# Patient Record
Sex: MALE | Race: WHITE | NOT HISPANIC OR LATINO | Employment: FULL TIME | URBAN - NONMETROPOLITAN AREA
[De-identification: names, ages, dates, MRNs, and addresses within clinical notes are randomized per-mention and may not be internally consistent; named-entity substitution may affect disease eponyms.]

---

## 2020-02-07 ENCOUNTER — OFFICE VISIT CONVERTED (OUTPATIENT)
Dept: FAMILY MEDICINE CLINIC | Age: 30
End: 2020-02-07
Attending: NURSE PRACTITIONER

## 2020-06-01 ENCOUNTER — OFFICE VISIT CONVERTED (OUTPATIENT)
Dept: FAMILY MEDICINE CLINIC | Age: 30
End: 2020-06-01
Attending: FAMILY MEDICINE

## 2020-06-01 ENCOUNTER — HOSPITAL ENCOUNTER (OUTPATIENT)
Dept: OTHER | Facility: HOSPITAL | Age: 30
Discharge: HOME | End: 2020-06-01
Attending: FAMILY MEDICINE

## 2020-06-01 LAB
ALBUMIN SERPL-MCNC: 4.6 G/DL (ref 3.5–5)
ALBUMIN/GLOB SERPL: 2 {RATIO} (ref 1.4–2.6)
ALP SERPL-CCNC: 73 U/L (ref 53–128)
ALT SERPL-CCNC: 12 U/L (ref 10–40)
ANION GAP SERPL CALC-SCNC: 15 MMOL/L (ref 8–19)
AST SERPL-CCNC: 18 U/L (ref 15–50)
BASOPHILS # BLD MANUAL: 0.05 10*3/UL (ref 0–0.2)
BASOPHILS NFR BLD MANUAL: 0.9 % (ref 0–3)
BILIRUB SERPL-MCNC: 0.4 MG/DL (ref 0.2–1.3)
BUN SERPL-MCNC: 13 MG/DL (ref 5–25)
BUN/CREAT SERPL: 14 {RATIO} (ref 6–20)
CALCIUM SERPL-MCNC: 9.1 MG/DL (ref 8.7–10.4)
CHLORIDE SERPL-SCNC: 105 MMOL/L (ref 99–111)
CHOLEST SERPL-MCNC: 124 MG/DL (ref 107–200)
CHOLEST/HDLC SERPL: 3.2 {RATIO} (ref 3–6)
CONV CO2: 25 MMOL/L (ref 22–32)
CONV TOTAL PROTEIN: 6.9 G/DL (ref 6.3–8.2)
CREAT UR-MCNC: 0.95 MG/DL (ref 0.7–1.2)
DEPRECATED RDW RBC AUTO: 42.5 FL
EOSINOPHIL # BLD MANUAL: 0.81 10*3/UL (ref 0–0.7)
EOSINOPHIL NFR BLD MANUAL: 14.8 % (ref 0–7)
ERYTHROCYTE [DISTWIDTH] IN BLOOD BY AUTOMATED COUNT: 13.3 % (ref 11.5–14.5)
GFR SERPLBLD BASED ON 1.73 SQ M-ARVRAT: >60 ML/MIN/{1.73_M2}
GLOBULIN UR ELPH-MCNC: 2.3 G/DL (ref 2–3.5)
GLUCOSE SERPL-MCNC: 99 MG/DL (ref 70–99)
GRANS (ABSOLUTE): 2.63 10*3/UL (ref 2–8)
GRANS: 48 % (ref 30–85)
HBA1C MFR BLD: 13.3 G/DL (ref 14–18)
HCT VFR BLD AUTO: 39.1 % (ref 42–52)
HDLC SERPL-MCNC: 39 MG/DL (ref 40–60)
IMM GRANULOCYTES # BLD: 0 10*3/UL (ref 0–0.54)
IMM GRANULOCYTES NFR BLD: 0 % (ref 0–0.43)
LDLC SERPL CALC-MCNC: 64 MG/DL (ref 70–100)
LYMPHOCYTES # BLD MANUAL: 1.71 10*3/UL (ref 1–5)
LYMPHOCYTES NFR BLD MANUAL: 5.1 % (ref 3–10)
MCH RBC QN AUTO: 29.2 PG (ref 27–31)
MCHC RBC AUTO-ENTMCNC: 34 G/DL (ref 33–37)
MCV RBC AUTO: 85.9 FL (ref 80–96)
MONOCYTES # BLD AUTO: 0.28 10*3/UL (ref 0.2–1.2)
OSMOLALITY SERPL CALC.SUM OF ELEC: 292 MOSM/KG (ref 273–304)
PLATELET # BLD AUTO: 222 10*3/UL (ref 130–400)
PMV BLD AUTO: 9.4 FL (ref 7.4–10.4)
POTASSIUM SERPL-SCNC: 3.9 MMOL/L (ref 3.5–5.3)
RBC # BLD AUTO: 4.55 10*6/UL (ref 4.7–6.1)
SODIUM SERPL-SCNC: 141 MMOL/L (ref 135–147)
TRIGL SERPL-MCNC: 105 MG/DL (ref 40–150)
TSH SERPL-ACNC: 0.35 M[IU]/L (ref 0.27–4.2)
VARIANT LYMPHS NFR BLD MANUAL: 31.2 % (ref 20–45)
VLDLC SERPL-MCNC: 21 MG/DL (ref 5–37)
WBC # BLD AUTO: 5.48 10*3/UL (ref 4.8–10.8)

## 2020-06-09 ENCOUNTER — HOSPITAL ENCOUNTER (OUTPATIENT)
Dept: OTHER | Facility: HOSPITAL | Age: 30
Discharge: HOME | End: 2020-06-09
Attending: FAMILY MEDICINE

## 2020-06-09 LAB
CHARACTER SMN: ABNORMAL
COLOR SMN: ABNORMAL
COMPLETE EJACULATE: YES
CONV COLLECTION METHOD: ABNORMAL
Lab: ABNORMAL
Lab: NO
MORPHOLOGY: ABNORMAL
PH UR: 8.5 [PH]
SEX ABSTIN DURATION TIME PATIENT: 5 D
SPECIMEN VOL SMN: 2 ML (ref 1.5–5)
SPERM # SMN: 27 10*6/ML
SPERM MOTILE NFR SMN: ABNORMAL %
TRANSPORT ISSUES: NO
VISC SMN: NORMAL CP

## 2020-09-01 ENCOUNTER — HOSPITAL ENCOUNTER (OUTPATIENT)
Dept: OTHER | Facility: HOSPITAL | Age: 30
Discharge: HOME | End: 2020-09-01
Attending: FAMILY MEDICINE

## 2020-09-01 LAB
CHARACTER SMN: ABNORMAL
COLOR SMN: ABNORMAL
COMPLETE EJACULATE: YES
CONV COLLECTION METHOD: ABNORMAL
Lab: ABNORMAL
Lab: NO
MORPHOLOGY: ABNORMAL
PH UR: 8 [PH]
SEX ABSTIN DURATION TIME PATIENT: 5 D
SPECIMEN VOL SMN: 3 ML (ref 1.5–5)
SPERM # SMN: 32.8 10*6/ML
SPERM MOTILE NFR SMN: ABNORMAL %
TRANSPORT ISSUES: NO
VISC SMN: ABNORMAL CP

## 2021-04-13 ENCOUNTER — OFFICE VISIT CONVERTED (OUTPATIENT)
Dept: FAMILY MEDICINE CLINIC | Age: 31
End: 2021-04-13
Attending: NURSE PRACTITIONER

## 2021-05-18 NOTE — PROGRESS NOTES
Thomas Lei  1990     Office/Outpatient Visit    Visit Date: Tue, Apr 13, 2021 10:25 am    Provider: Otilia Haskins N.P. (Assistant: Bren Anderson,  )    Location: [facility]        Electronically signed by Otilia Haskins N.P. on  04/13/2021 06:06:24 PM                             Subjective:        CC: Mr. Lei is a 31 year old White male.  Pt states he has been having difficulty swallowing for 13 year. Pt states it has gotten worse overtime. Pt discribes it as extreme acid reflux. Pt also wants to discuss anxiety.          HPI:           Complaint of dysphagia, unspecified..  The symptom began years ago.  The severity is of severe intensity.  Associated symptoms include food stuck in esophagus.  He denies cough, diarrhea, hematuria or nausea.  Prior work-up has included none.  Has had this since teens.  Knows that he does have acid reflux, but never has been on any medication  - No continued vomiting when flare up, unable to get ever water down - did have an episode that he felt it was completely stuck - had to vomit to get it out does chew tobacco for about 16 years   No family history of any neck conditions          In regard to the anxiety disorder, unspecified, over this past year - more he has suggestive symptoms but does not currently carry an official diagnosis of anxiety disorder.  feels like this may be anxiety - happens a couple of times per day - feel like numbness in chest wll, shortness of air     ROS:     CONSTITUTIONAL:  Negative for chills, fatigue and fever.      CARDIOVASCULAR:  Negative for chest pain and pedal edema.      RESPIRATORY:  Negative for recent cough and dyspnea.      GASTROINTESTINAL:  Positive for acid reflux symptoms and dysphagia.   Negative for abdominal pain, constipation, diarrhea, heartburn, hematemesis, nausea or vomiting.      PSYCHIATRIC:  Positive for anxiety, feelings of stress, difficulty concentrating and (second shift).    Negative for depression or suicidal thoughts.          Past Medical History / Family History / Social History:         Last Reviewed on 4/13/2021 11:09 AM by Otilia Haskins    Surgical History:         Positive for    Hernia Repair: at age 17; right inguinal; uncomplicated; dual;,    3 previous concussion; and    left shoulder labrum repair at age 22 - sports related injury;;         Family History:         Positive for cardiomyapthy - father and fathers twin brother.      Positive for sjogrens - mother.          Social History:     Occupation: [employer]     Marital Status:      Children: None         Tobacco/Alcohol/Supplements:     Last Reviewed on 4/13/2021 11:09 AM by Otilia Haskins    Tobacco: He has never smoked. He currently uses smokeless tobacco, 1/2 can cans/pouches daily..          Alcohol: Frequency: Socially Just on weekends When he drinks, the average quantity of alcohol is 3-4 drinks.   He typically consumes malt liquor.          Substance Abuse History:     None         Mental Health History:     NEGATIVE         Current Problems:     Last Reviewed on 4/13/2021 11:00 AM by Otilia Haskins    Influenza due to identified novel influenza A virus with other respiratory manifestations    Fever, unspecified    Encounter for screening for depression    Procreative counseling and advice using natural family planning    Encounter for general adult medical examination without abnormal findings    Dysphagia, unspecified    Anxiety disorder, unspecified        Immunizations:     None        Allergies:     Last Reviewed on 4/13/2021 10:27 AM by Bren Anderson    No Known Allergies.        Current Medications:     Last Reviewed on 4/13/2021 10:27 AM by Bren Anderson    No Known Medications.    Vitamin B Complex  [Take 3 capsules once a day.]        Objective:        Vitals:         Current: 4/13/2021 10:31:35 AM    Ht:  6 ft, 1 in;  Wt: 185.2 lbs;  BMI: 24.4T: 96.4 F (temporal);  BP: 123/74 mm Hg  (left arm, sitting);  P: 84 bpm (left arm (BP Cuff), sitting);  sCr: 0.95 mg/dL;  GFR: 115.06        Exams:     PHYSICAL EXAM:     GENERAL: Vitals recorded well developed, well nourished;  no apparent distress;     E/N/T:     NECK: range of motion is normal;     RESPIRATORY: normal appearance and symmetric expansion of chest wall; normal respiratory rate and pattern with no distress; normal breath sounds with no rales, rhonchi, wheezes or rubs;     CARDIOVASCULAR: normal rate; rhythm is regular;  no edema;     GASTROINTESTINAL: nontender; normal bowel sounds; no organomegaly;     MUSCULOSKELETAL: normal gait; normal range of motion of all major muscle groups; no limb or joint pain with range of motion;     NEUROLOGIC: mental status: alert and oriented x 3;     PSYCHIATRIC: appropriate affect and demeanor; normal speech pattern; normal thought and perception;         Assessment:         R13.10   Dysphagia, unspecified       F41.9   Anxiety disorder, unspecified           ORDERS:         Meds Prescribed:       [New Rx] omeprazole 20 mg oral capsule,delayed release (enteric coated) [take 1 capsule (20 mg) by oral route daily ], #30 (thirty) capsules, Refills: 1 (one)       [New Rx] sertraline 50 mg oral tablet [take 1/2 tab x 1 week then 1 tablet (50 mg) by oral route once daily], #30 (thirty) tablets, Refills: 1 (one)         Procedures Ordered:       REFER  Referral to Specialist or Other Facility  (Send-Out)                      Plan:         Dysphagia, unspecifiedWill have him take a trial of omeprazole while waiting on referral appt.  I have recommended that he take this daily for at least 2 weeks.  He may find improvement but he would prefer to see a specialist for evaluation        REFERRALS:  Referral initiated to a general surgeon ( for evaluation of dysphagia/ acid reflux ).            Prescriptions:       [New Rx] omeprazole 20 mg oral capsule,delayed release (enteric coated) [take 1 capsule (20 mg) by oral  route daily ], #30 (thirty) capsules, Refills: 1 (one)           Orders:       REFER  Referral to Specialist or Other Facility  (Send-Out)              Anxiety disorder, unspecifiedI will start a short trial of SSRI - he will follow up in 3-4 weeks          Prescriptions:       [New Rx] sertraline 50 mg oral tablet [take 1/2 tab x 1 week then 1 tablet (50 mg) by oral route once daily], #30 (thirty) tablets, Refills: 1 (one)             Charge Capture:         Primary Diagnosis:     R13.10  Dysphagia, unspecified           Orders:      [id number]  Office/outpatient visit; established patient, level 4  (In-House)              F41.9  Anxiety disorder, unspecified

## 2021-05-18 NOTE — PROGRESS NOTES
Thomas Lei  1990     Office/Outpatient Visit    Visit Date: Fri, Feb 7, 2020 02:06 pm    Provider: Otilia Haskins N.P. (Assistant: Lacey Jaramillo MA)    Location: [facility]        Electronically signed by Otilia Haskins N.P. on  02/07/2020 02:58:27 PM                             Subjective:        CC: Mr. Lei is a 30 year old male.  This is his first visit to the clinic.  dry cough, congestion, body aches,         HPI:           Complaint of fever, unspecified..  The symptom began 3 days ago.  The severity is of severe intensity.  This is the first episode of this type of pain.  Associated symptoms include chills.  joint pain     ROS:     CONSTITUTIONAL:  Positive for chills and fever.   Negative for fatigue.      CARDIOVASCULAR:  Negative for chest pain and pedal edema.      RESPIRATORY:  Positive for recent cough.   Negative for dyspnea, pleuritic chest pain or frequent wheezing.      GASTROINTESTINAL:  Negative for diarrhea, heartburn, nausea and vomiting.      MUSCULOSKELETAL:  Positive for arthralgias and myalgias.          Past Medical History / Family History / Social History:         Surgical History:         Positive for    Hernia Repair: at age 17; right inguinal; uncomplicated; dual;,    3 previous concussion; and    left shoulder labrum repair at age 22 - sports related injury;;         Family History:         Positive for cardiomyapthy - father and fathers twin brother.      Positive for sjogrens - mother.          Social History:     Occupation: [employer]     Marital Status:      Children: None         Tobacco/Alcohol/Supplements:     Last Reviewed on 2/07/2020 02:08 PM by Lacey Jaramillo    Tobacco: He has never smoked.          Alcohol: Frequency: Socially Just on weekends When he drinks, the average quantity of alcohol is 3-4 drinks.   He typically consumes malt liquor.          Substance Abuse History:     None         Mental Health History:     NEGATIVE          Current Problems:     Influenza due to identified novel influenza A virus with other respiratory manifestations    Fever, unspecified        Immunizations:     None        Allergies:     Last Reviewed on 2/07/2020 02:08 PM by Lacey Jaramillo    No Known Allergies.        Current Medications:     Last Reviewed on 2/07/2020 02:08 PM by Lacey Jaramillo    No Known Medications.        Objective:        Vitals:         Current: 2/7/2020 2:11:30 PM    Ht:  6 ft, 1 in (Estimated);  Wt: 186.6 lbs;  BMI: 24.6T: 99.2 F (oral);  BP: 129/72 mm Hg (left arm, sitting);  P: 92 bpm (left arm (BP Cuff), sitting)        Exams:     PHYSICAL EXAM:     GENERAL: Vitals recorded well developed, well nourished;  no apparent distress, appears moderately ill;     NECK: range of motion is normal;     RESPIRATORY: normal appearance and symmetric expansion of chest wall; normal respiratory rate and pattern with no distress; normal breath sounds with no rales, rhonchi, wheezes or rubs;     CARDIOVASCULAR: normal rate; rhythm is regular;  no edema;     LYMPHATIC: no enlargement of cervical or facial nodes; no supraclavicular nodes;     MUSCULOSKELETAL: normal gait; normal range of motion of all major muscle groups; no limb or joint pain with range of motion;     NEUROLOGIC: mental status: alert and oriented x 3;     PSYCHIATRIC: appropriate affect and demeanor; normal speech pattern; normal thought and perception;         Lab/Test Results:         Influenza A: Positive (02/07/2020),     Influenza B: Negative (02/07/2020),     Performed by:: tls (02/07/2020),             Assessment:         R50.9   Fever, unspecified       J09.X2   Influenza due to identified novel influenza A virus with other respiratory manifestations       Z13.31   Encounter for screening for depression           ORDERS:         Meds Prescribed:       [New Rx] Tamiflu 75 mg oral capsule [take 1 capsule (75 mg) by oral route 2 times per day], #10 (ten) capsules, Refills: 0  (zero)         Lab Orders:       [id number]  Infectious agent antigen detection by immunoassay; Influenza  (In-House)            83720  Infectious agent antigen detection by immunoassay; Influenza  (In-House)              Other Orders:         Depression screen negative  (In-House)                      Plan:         Fever, unspecifiedibuprofen/tylenol for fever, increase fluids, contact precautions - RTW Monday unless fever then recommend until fever free x 24 hours           Orders:       [id number]  Infectious agent antigen detection by immunoassay; Influenza  (In-House)            93624  Infectious agent antigen detection by immunoassay; Influenza  (In-House)              Influenza due to identified novel influenza A virus with other respiratory manifestationsrobitussin/ nyquil for cough           Prescriptions:       [New Rx] Tamiflu 75 mg oral capsule [take 1 capsule (75 mg) by oral route 2 times per day], #10 (ten) capsules, Refills: 0 (zero)         Encounter for screening for depression    MIPS PHQ-9 Depression Screening: Completed form scanned and in chart; Total Score 0; Negative Depression Screen           Orders:         Depression screen negative  (In-House)                  Charge Capture:         Primary Diagnosis:     R50.9  Fever, unspecified           Orders:      [id number]  Office visit - new pt, level 3  (In-House)            [id number]  Infectious agent antigen detection by immunoassay; Influenza  (In-House)            28977  Infectious agent antigen detection by immunoassay; Influenza  (In-House)              J09.X2  Influenza due to identified novel influenza A virus with other respiratory manifestations     Z13.31  Encounter for screening for depression           Orders:        Depression screen negative  (In-House)

## 2021-05-18 NOTE — PROGRESS NOTES
Thomas Lei  1990     Office/Outpatient Visit    Visit Date: Mon, Jun 1, 2020 09:35 am    Provider: Michael Witt MD (Assistant: Spurling, Sarah C, MA)    Location: [facility]        Electronically signed by Michael Witt MD on  06/01/2020 06:00:18 PM                             Subjective:        CC: Mr. Lei is a 30 year old White male.  prevenative exam.          HPI: BP today is 121/76 with a HR of 83.          Patient to be evaluated for encounter for general adult medical examination without abnormal findings.            PHQ-9 Depression Screening: Completed form scanned and in chart; Total Score 0       Pt and his wife are trying to get pregnant and he is hoping to get a check up. He has been  for 6 years, they have been trying to get pregnant for 4 months without success so far. They have never used contraception other than the pull out method. Wants testosterone level, FSH and LH, TSH, and prolactin.    ROS:     CONSTITUTIONAL:  Negative for fatigue and fever.      EYES:  Negative for blurred vision.      E/N/T:  Negative for diminished hearing and nasal congestion.      CARDIOVASCULAR:  Negative for chest pain and palpitations.      RESPIRATORY:  Negative for recent cough and dyspnea.      GASTROINTESTINAL:  Negative for abdominal pain, constipation, diarrhea, nausea and vomiting.      GENITOURINARY:  Positive for difficulty conceiving after 4 months of trying.   Negative for dysuria.      MUSCULOSKELETAL:  Negative for arthralgias and myalgias.      INTEGUMENTARY:  Negative for atypical mole(s) and rash.      NEUROLOGICAL:  Negative for paresthesias and weakness.      PSYCHIATRIC:  Negative for anxiety, depression and sleep disturbance.          Past Medical History / Family History / Social History:         Surgical History:         Positive for    Hernia Repair: at age 17; right inguinal; uncomplicated; dual;,    3 previous concussion; and    left shoulder labrum  repair at age 22 - sports related injury;;         Family History:         Positive for cardiomyapthy - father and fathers twin brother.      Positive for sjogrens - mother.          Social History:     Occupation: [employer]     Marital Status:      Children: None         Tobacco/Alcohol/Supplements:     Last Reviewed on 2/07/2020 02:08 PM by Lacey Jaramillo    Tobacco: He has never smoked.          Alcohol: Frequency: Socially Just on weekends When he drinks, the average quantity of alcohol is 3-4 drinks.   He typically consumes malt liquor.          Substance Abuse History:     None         Mental Health History:     NEGATIVE         Current Problems:     Influenza due to identified novel influenza A virus with other respiratory manifestations    Fever, unspecified    Encounter for screening for depression        Immunizations:     None        Allergies:     Last Reviewed on 2/07/2020 02:08 PM by Lacey Jaramillo    No Known Allergies.        Current Medications:     Last Reviewed on 6/01/2020 09:41 AM by Spurling, Sarah C    No Known Medications.    Vitamin B Complex  [Take 3 capsules once a day.]    Natural Testosterone Booster  [3 capsules one time a day]    Omega Complex  [1 a day]    Vitamin D3  [One capsule a day]    Calcium   [3 a day]        Objective:        Vitals:         Current: 6/1/2020 9:48:19 AM    Ht:  6 ft, 1 in;  Wt: 189 lbs;  BMI: 24.9T: 98.4 F (oral);  BP: 121/76 mm Hg (left arm, sitting);  P: 83 bpm (left arm (BP Cuff), sitting)VA: 20/40 OD, 20/40 OS (near, with correction)        Exams:     PHYSICAL EXAM:     GENERAL: Vitals recorded well developed, well nourished;  well groomed;  no apparent distress;     EYES: extraocular movements intact; conjunctiva and cornea are normal; PERRLA;     E/N/T: OROPHARYNX:  normal mucosa, dentition, gingiva, and posterior pharynx;     NECK: range of motion is normal; trachea is midline;     RESPIRATORY: normal respiratory rate and pattern with no distress;  normal breath sounds with no rales, rhonchi, wheezes or rubs;     CARDIOVASCULAR: normal rate; rhythm is regular;  no systolic murmur; no edema;     GASTROINTESTINAL: nontender; normal bowel sounds;     MUSCULOSKELETAL: normal gait; normal overall tone     NEUROLOGIC: mental status: alert and oriented x 3; reflexes: knee jerks: 2+;  GROSSLY INTACT     PSYCHIATRIC:  appropriate affect and demeanor; normal speech pattern; grossly normal memory;         Assessment:         Z00.00   Encounter for general adult medical examination without abnormal findings       Z13.31   Encounter for screening for depression       Z31.61   Procreative counseling and advice using natural family planning           ORDERS:         Lab Orders:       [id number]  [facility] PHYSICAL: CMP, CBC, TSH, LIPID: [id number], [id number], [id number], [id number]  (Send-Out)            [id number]  Semen analysis; volume, count, motility, and differential  (Send-Out)                      Plan:         Encounter for general adult medical examination without abnormal findingsPt is overall in good health and has few medical conditions. We discussed nutrition and pt advised to eat a low sugar, non-processed diet. We discussed physical activity and healthy weight and pt is advised to exercise 150 minutes per week in addition to his activity at work. Pt does not smoke, does not use tobacco products, and does not use illicit drugs. Pt is advised to limit alcohol to 2 drinks per day at most. He is advised to stay up to date with dental health and immunizations. He is UTD on screenings.     LABORATORY:  Labs ordered to be performed today include PHYSICAL PANEL; CMP, CBC, TSH, LIPID.            Orders:       [id number]  [facility] PHYSICAL: CMP, CBC, TSH, LIPID: [id number], [id number], [id number], [id number]  (Send-Out)              Encounter for screening for depression    MIPS PHQ-9 Depression Screening: Completed form scanned and in chart; Total Score 0         Procreative counseling and advice using natural  family planningPt is advised LH and FSH are not really indicated and neither is testosterone at this time given paucity of Sx. Instead a semen analysis is more reasonable although infertility is not really Dx'd until 12 months have passed without pregnancy and actively trying to get pregnant. Consider referral to urology if needed.           Orders:       [id number]  Semen analysis; volume, count, motility, and differential  (Send-Out)                  Charge Capture:         Primary Diagnosis:     Z00.00  Encounter for general adult medical examination without abnormal findings           Orders:      [id number]  Preventive medicine, established patient, age 18-39 years  (In-House)              Z13.31  Encounter for screening for depression     Z31.61  Procreative counseling and advice using natural family planning

## 2021-07-02 ENCOUNTER — OFFICE VISIT (OUTPATIENT)
Dept: FAMILY MEDICINE CLINIC | Age: 31
End: 2021-07-02

## 2021-07-02 VITALS
WEIGHT: 191.8 LBS | HEART RATE: 70 BPM | TEMPERATURE: 98.5 F | DIASTOLIC BLOOD PRESSURE: 84 MMHG | SYSTOLIC BLOOD PRESSURE: 115 MMHG | BODY MASS INDEX: 25.42 KG/M2 | HEIGHT: 73 IN

## 2021-07-02 VITALS
HEIGHT: 73 IN | TEMPERATURE: 99.2 F | DIASTOLIC BLOOD PRESSURE: 72 MMHG | SYSTOLIC BLOOD PRESSURE: 129 MMHG | HEART RATE: 92 BPM | BODY MASS INDEX: 24.73 KG/M2 | WEIGHT: 186.6 LBS

## 2021-07-02 VITALS
TEMPERATURE: 98.4 F | DIASTOLIC BLOOD PRESSURE: 76 MMHG | BODY MASS INDEX: 25.05 KG/M2 | SYSTOLIC BLOOD PRESSURE: 121 MMHG | HEART RATE: 83 BPM | WEIGHT: 189 LBS | HEIGHT: 73 IN

## 2021-07-02 VITALS
BODY MASS INDEX: 24.55 KG/M2 | HEIGHT: 73 IN | SYSTOLIC BLOOD PRESSURE: 123 MMHG | HEART RATE: 84 BPM | WEIGHT: 185.2 LBS | TEMPERATURE: 96.4 F | DIASTOLIC BLOOD PRESSURE: 74 MMHG

## 2021-07-02 DIAGNOSIS — F41.9 ANXIETY: Primary | Chronic | ICD-10-CM

## 2021-07-02 PROCEDURE — 99213 OFFICE O/P EST LOW 20 MIN: CPT | Performed by: NURSE PRACTITIONER

## 2021-07-02 RX ORDER — OMEPRAZOLE 20 MG/1
20 CAPSULE, DELAYED RELEASE ORAL AS NEEDED
Status: ON HOLD | COMMUNITY
Start: 2021-04-13 | End: 2022-04-14 | Stop reason: SDUPTHER

## 2021-07-02 NOTE — PROGRESS NOTES
"Chief Complaint  Anxiety and Follow-up    Subjective          Thomas Lei presents to [facility] Thomas is here for follow-up on his anxiety.  He was seen in the office about 3 months ago and placed on sertraline.  He reports today that he is doing very well on this medication.  No side effects.  It has improved his palpitations and chest tightness.    Review of Systems   Constitutional: Negative for fatigue and fever.   Respiratory: Negative for shortness of breath.    Cardiovascular: Negative for chest pain.   Psychiatric/Behavioral: Positive for stress (new baby). Negative for agitation, decreased concentration, dysphoric mood, sleep disturbance and depressed mood. The patient is not nervous/anxious.          Health Maintenance Due   Topic Date Due   • ANNUAL PHYSICAL  Never done   • TDAP/TD VACCINES (1 - Tdap) Never done   • HEPATITIS C SCREENING  Never done        Objective     Vital Signs:   /84 (BP Location: Right arm, Patient Position: Sitting)   Pulse 70   Temp 98.5 °F (36.9 °C) (Oral)   Ht 185.4 cm (73\")   Wt 87 kg (191 lb 12.8 oz)   BMI 25.30 kg/m²       Physical Exam  Vitals reviewed.   Constitutional:       Appearance: Normal appearance.   HENT:      Head: Normocephalic.   Eyes:      Pupils: Pupils are equal, round, and reactive to light.   Cardiovascular:      Rate and Rhythm: Normal rate and regular rhythm.      Heart sounds: No murmur heard.     Pulmonary:      Effort: Pulmonary effort is normal.      Breath sounds: Normal breath sounds.   Musculoskeletal:         General: Normal range of motion.   Neurological:      Mental Status: He is alert.   Psychiatric:         Mood and Affect: Mood normal.         Behavior: Behavior normal.         Judgment: Judgment normal.          Result Review :                      Assessment and Plan    Diagnoses and all orders for this visit:    1. Anxiety (Primary)  Comments:  continue current treatment and follow up " in 6 months . He will be moving soon and will be having to transfer care   Orders:  -     sertraline (ZOLOFT) 50 MG tablet; Take 1 tablet by mouth Daily.  Dispense: 90 tablet; Refill: 0            Follow Up    Return in about 6 months (around 1/2/2022) for Annual physical, Recheck.      Patient was given instructions and counseling regarding his condition or for health maintenance advice. Please see specific information pulled into the AVS if appropriate.

## 2021-11-21 DIAGNOSIS — F41.9 ANXIETY: Chronic | ICD-10-CM

## 2022-02-23 ENCOUNTER — OFFICE VISIT (OUTPATIENT)
Dept: FAMILY MEDICINE CLINIC | Age: 32
End: 2022-02-23

## 2022-02-23 VITALS
HEIGHT: 73 IN | SYSTOLIC BLOOD PRESSURE: 130 MMHG | WEIGHT: 193 LBS | DIASTOLIC BLOOD PRESSURE: 92 MMHG | HEART RATE: 65 BPM | OXYGEN SATURATION: 97 % | BODY MASS INDEX: 25.58 KG/M2

## 2022-02-23 DIAGNOSIS — F41.9 ANXIETY: Chronic | ICD-10-CM

## 2022-02-23 DIAGNOSIS — R41.840 ATTENTION AND CONCENTRATION DEFICIT: ICD-10-CM

## 2022-02-23 DIAGNOSIS — R13.19 OTHER DYSPHAGIA: Primary | ICD-10-CM

## 2022-02-23 PROCEDURE — 99213 OFFICE O/P EST LOW 20 MIN: CPT | Performed by: NURSE PRACTITIONER

## 2022-02-23 RX ORDER — BUPROPION HYDROCHLORIDE 150 MG/1
150 TABLET ORAL DAILY
Qty: 30 TABLET | Refills: 1 | Status: SHIPPED | OUTPATIENT
Start: 2022-02-23 | End: 2022-04-18 | Stop reason: SDUPTHER

## 2022-02-23 NOTE — PROGRESS NOTES
Chief Complaint  Thomas Lei presents to [facility] for trouble swollowing (patient complains of trouble swollowing, feels like he cannot breathe and has to vomit has been going on for 2-3 years but is worsening to weekly ) and Focus (patient complains of trouble focusing at work )    Subjective          Has been having difficulty swallowing, This has been going on for years, but worsening over the past few months.   Will have episodes at least one time weekly.  Will generally be triggered by 'chunks' of food which he reports will have a spasm and unable to get any consistency down for about 30min-1hr after.  This lodging of the food is generally in the lower esophageal / epigastric area  - He reports having a barium swallow in high school  Was told GERD - started on PPI with improvement Currently taking omeprazole  Denies any acid reflux symptoms      Wants to discuss problems with focus  Reports that over the past few months , starting to interfere with his job performance.  He finds himself losing focus, unable to stay on task.  He does report that in college, he did have similar symptoms but was in a position that he was unable to receive treatment.  He feels like he is unable to take on large tasks and is falling behind on his work.  He has never been on medication for this in the past.  He is currently taking sertraline for his anxiety and reports that this is working very well for his previous symptoms of palpitations.               Review of Systems      No Known Allergies   Past Medical History:   Diagnosis Date   • Anxiety disorder, unspecified    • Concussion     X3   • Dysphagia, unspecified    • Fever, unspecified    • Influenza due to identified novel influenza A virus with other respiratory manifestations    • Procreative counseling and advice using natural family planning      Current Outpatient Medications   Medication Sig Dispense Refill   • omeprazole  "(priLOSEC) 20 MG capsule Take 20 mg by mouth As Needed.     • sertraline (ZOLOFT) 50 MG tablet Take 1 tablet by mouth Daily. 90 tablet 0   • buPROPion XL (Wellbutrin XL) 150 MG 24 hr tablet Take 1 tablet by mouth Daily. 30 tablet 1     No current facility-administered medications for this visit.     Past Surgical History:   Procedure Laterality Date   • HERNIA REPAIR      Hernia Repair: at age 17; right inguinal; uncomplicated; dual;   • SHOULDER SURGERY      left shoulder labrum repair at age 22 - sports related injury      Social History     Tobacco Use   • Smoking status: Never Smoker   • Smokeless tobacco: Current User     Types: Chew   • Tobacco comment: 1/2 CAN/POUCHES DAILY    Vaping Use   • Vaping Use: Never used   Substance Use Topics   • Alcohol use: Yes     Comment: 1-2 drinks per month    • Drug use: Never     Comment: NONE     Family History   Problem Relation Age of Onset   • Sjogren's syndrome Mother    • Diverticulitis Mother    • Cardiomyopathy Father    • Cardiomyopathy Paternal Uncle      Health Maintenance Due   Topic Date Due   • ANNUAL PHYSICAL  Never done   • TDAP/TD VACCINES (1 - Tdap) Never done   • HEPATITIS C SCREENING  Never done   • INFLUENZA VACCINE  Never done   • COVID-19 Vaccine (3 - Booster for Pfizer series) 11/10/2021      Immunization History   Administered Date(s) Administered   • COVID-19 (PFIZER) PURPLE CAP 05/19/2021, 06/10/2021        Objective     Vitals:    02/23/22 0829   BP: 130/92   BP Location: Right arm   Patient Position: Sitting   Cuff Size: Adult   Pulse: 65   SpO2: 97%   Weight: 87.5 kg (193 lb)   Height: 185.4 cm (73\")     Body mass index is 25.46 kg/m².     Physical Exam  Vitals reviewed.   Constitutional:       Appearance: Normal appearance.   HENT:      Head: Normocephalic.   Eyes:      Pupils: Pupils are equal, round, and reactive to light.   Cardiovascular:      Rate and Rhythm: Normal rate and regular rhythm.      Heart sounds: No murmur " heard.      Pulmonary:      Effort: Pulmonary effort is normal.      Breath sounds: Normal breath sounds.   Abdominal:      Palpations: Abdomen is soft.      Tenderness: There is no abdominal tenderness.   Musculoskeletal:         General: Normal range of motion.   Neurological:      Mental Status: He is alert.   Psychiatric:         Mood and Affect: Mood normal.         Behavior: Behavior normal.           Result Review :                               Assessment and Plan      Diagnoses and all orders for this visit:    1. Other dysphagia (Primary)  Comments:  will refer to Gastroenterology for EGD/evaluation of possible stricture  continue daily omeprazole    Orders:  -     Ambulatory Referral to Gastroenterology    2. Anxiety  Comments:  continue current treatment and follow up in 6 months   Orders:  -     sertraline (ZOLOFT) 50 MG tablet; Take 1 tablet by mouth Daily.  Dispense: 90 tablet; Refill: 0    3. Attention and concentration deficit  Comments:  Will try him on wellbutrin, have him follow up in 6-8 weeks, if no improvement - refer to Celina Barclay for further recommendations   Orders:  -     buPROPion XL (Wellbutrin XL) 150 MG 24 hr tablet; Take 1 tablet by mouth Daily.  Dispense: 30 tablet; Refill: 1              Follow Up     Return in about 8 weeks (around 4/20/2022) for Follow up medications .

## 2022-02-28 ENCOUNTER — OFFICE VISIT (OUTPATIENT)
Dept: GASTROENTEROLOGY | Facility: CLINIC | Age: 32
End: 2022-02-28

## 2022-02-28 VITALS
SYSTOLIC BLOOD PRESSURE: 125 MMHG | BODY MASS INDEX: 23.5 KG/M2 | RESPIRATION RATE: 16 BRPM | WEIGHT: 189 LBS | DIASTOLIC BLOOD PRESSURE: 72 MMHG | HEART RATE: 85 BPM | HEIGHT: 75 IN

## 2022-02-28 DIAGNOSIS — R13.19 ESOPHAGEAL DYSPHAGIA: Primary | ICD-10-CM

## 2022-02-28 PROCEDURE — 99214 OFFICE O/P EST MOD 30 MIN: CPT | Performed by: NURSE PRACTITIONER

## 2022-02-28 NOTE — PROGRESS NOTES
Patient Name: Thomas Lei   Visit Date: 02/28/2022   Patient ID: [id number]  Provider: JUAN Martines    Sex: male  Location:  Location Address:  Location Phone: [phone] [facility] [place] [place] [address] [address] [phone]    YOB: 1990      Primary Care Provider Otilia Haskins APRN      Referring Provider: JUAN Rice        Chief Complaint  Difficulty Swallowing (New Patient  c/o food getting stuck )    History of Present Illness  Thomas Lei is a 32 y.o. who presents to [facility] on referral from JUAN Rice for a gastroenterology evaluation of Difficulty Swallowing (New Patient  c/o food getting stuck ).    Mr. Lei reports lower esophageal dysphagia now for several years however episodes are occurring more frequently.  Admits to trouble swallowing with solids however liquids will also make dysphagia worse if food is already lodged in the esophagus.  When food becomes stuck he has to force himself to vomit to find relief.  Denies any heartburn, nausea, vomiting, or frequent NSAID usage.  Drinking 2 to 3 cups of coffee daily.  Appetite and weight stable.       Labs Result Review Imaging    Past Medical History:   Diagnosis Date   • Anxiety disorder, unspecified    • Concussion     X3   • Dysphagia, unspecified    • Fever, unspecified    • Influenza due to identified novel influenza A virus with other respiratory manifestations    • Procreative counseling and advice using natural family planning        Past Surgical History:   Procedure Laterality Date   • HERNIA REPAIR      Hernia Repair: at age 17; right inguinal; uncomplicated; dual;   • SHOULDER SURGERY      left shoulder labrum repair at age 22 - sports related injury         Current Outpatient Medications:   •  buPROPion XL (Wellbutrin XL) 150 MG 24 hr tablet, Take 1 tablet by mouth Daily., Disp: 30 tablet, Rfl: 1  •  omeprazole (priLOSEC) 20 MG capsule, Take 20  "mg by mouth As Needed., Disp: , Rfl:   •  sertraline (ZOLOFT) 50 MG tablet, Take 1 tablet by mouth Daily., Disp: 90 tablet, Rfl: 0     No Known Allergies    Family History   Problem Relation Age of Onset   • Sjogren's syndrome Mother    • Diverticulitis Mother    • Cardiomyopathy Father    • Cardiomyopathy Paternal Uncle         Social History     Social History Narrative   • Not on file         Objective     Review of Systems   Constitutional: Negative for appetite change and unexpected weight loss.   Gastrointestinal: Negative for abdominal pain, nausea, vomiting and GERD.        Vital Signs:   /72 (BP Location: Right arm, Patient Position: Sitting, Cuff Size: Adult)   Pulse 85   Resp 16   Ht 190.5 cm (75\")   Wt 85.7 kg (189 lb)   BMI 23.62 kg/m²       Physical Exam  Constitutional:       General: He is not in acute distress.     Appearance: Normal appearance. He is well-developed and normal weight.   HENT:      Head: Normocephalic and atraumatic.   Eyes:      Conjunctiva/sclera: Conjunctivae normal.      Pupils: Pupils are equal, round, and reactive to light.      Visual Fields: Right eye visual fields normal and left eye visual fields normal.   Cardiovascular:      Rate and Rhythm: Normal rate and regular rhythm.      Heart sounds: Normal heart sounds.   Pulmonary:      Effort: Pulmonary effort is normal. No retractions.      Breath sounds: Normal breath sounds and air entry.   Abdominal:      General: Bowel sounds are normal. There is no distension.      Palpations: Abdomen is soft.      Tenderness: There is no abdominal tenderness.      Comments: No appreciable hepatosplenomegaly or ascites   Musculoskeletal:         General: Normal range of motion.      Cervical back: Normal range of motion and neck supple.   Skin:     General: Skin is warm and dry.   Neurological:      Mental Status: He is alert and oriented to person, place, and time.   Psychiatric:         Mood and Affect: Mood and affect " normal.         Behavior: Behavior normal.         Result Review :   The following data was reviewed by: JUAN Martines on 02/28/2022:      No results found for: LIPASE, AMYLASE, IRON, TIBC, LABIRON, TRANSFERRIN, FERRITIN, SEDRATE, CRP, AFPTM, DSDNA, EXPANDEDENA, SMOOTHMUSCAB, CERULOPLSM, LABIMMURE, TOTIGGREF, IGA, IGM, MITOAB, HEPBSAG, HEPAIGM, HEPBIGMCORE, HEPCVIRUSABY, PROTIME, INR, AMMONIA          Assessment and Plan    Diagnoses and all orders for this visit:    1. Esophageal dysphagia (Primary)  -     Case Request; Standing  -     Case Request    Other orders  -     Follow Anesthesia Guidelines / Protocol; Future  -     Obtain Informed Consent; Future      ESOPHAGOGASTRODUODENOSCOPY (N/A)       Follow Up   Return for Follow up after procedure.  Patient was given instructions and counseling regarding his condition or for health maintenance advice. Please see specific information pulled into the AVS if appropriate.

## 2022-03-09 ENCOUNTER — PATIENT ROUNDING (BHMG ONLY) (OUTPATIENT)
Dept: GASTROENTEROLOGY | Facility: CLINIC | Age: 32
End: 2022-03-09

## 2022-03-09 NOTE — PROGRESS NOTES
March 9, 2022    Hello, may I speak with Thomas Lei?    My name is Christina     I am  with [facility]  [address] [address]  [facility] [address] [phone].    Before we get started may I verify your date of birth? 1990    I am calling to officially welcome you to our practice and ask about your recent visit. Is this a good time to talk? Yes     Tell me about your visit with us. What things went well?  Easy to find, short wait time, listened to my concerns, seemed like they generally cared      We're always looking for ways to make our patients' experiences even better. Do you have recommendations on ways we may improve?  No-if I could give it 5 out of 5 stars I would     Overall were you satisfied with your first visit to our practice? Yes        I appreciate you taking the time to speak with me today. Is there anything else I can do for you?       Thank you, and have a great day.

## 2022-04-07 ENCOUNTER — TELEPHONE (OUTPATIENT)
Dept: GASTROENTEROLOGY | Facility: CLINIC | Age: 32
End: 2022-04-07

## 2022-04-14 ENCOUNTER — HOSPITAL ENCOUNTER (OUTPATIENT)
Facility: HOSPITAL | Age: 32
Setting detail: HOSPITAL OUTPATIENT SURGERY
Discharge: HOME | End: 2022-04-14
Attending: INTERNAL MEDICINE | Admitting: INTERNAL MEDICINE

## 2022-04-14 ENCOUNTER — ANESTHESIA EVENT (OUTPATIENT)
Dept: GASTROENTEROLOGY | Facility: HOSPITAL | Age: 32
End: 2022-04-14

## 2022-04-14 ENCOUNTER — ANESTHESIA (OUTPATIENT)
Dept: GASTROENTEROLOGY | Facility: HOSPITAL | Age: 32
End: 2022-04-14

## 2022-04-14 VITALS
OXYGEN SATURATION: 97 % | HEART RATE: 63 BPM | WEIGHT: 189.15 LBS | TEMPERATURE: 98.2 F | SYSTOLIC BLOOD PRESSURE: 105 MMHG | RESPIRATION RATE: 15 BRPM | HEIGHT: 76 IN | BODY MASS INDEX: 23.03 KG/M2 | DIASTOLIC BLOOD PRESSURE: 62 MMHG

## 2022-04-14 DIAGNOSIS — R13.19 ESOPHAGEAL DYSPHAGIA: ICD-10-CM

## 2022-04-14 PROCEDURE — 25010000002 PROPOFOL 10 MG/ML EMULSION: Performed by: NURSE ANESTHETIST, CERTIFIED REGISTERED

## 2022-04-14 PROCEDURE — 88305 TISSUE EXAM BY PATHOLOGIST: CPT | Performed by: INTERNAL MEDICINE

## 2022-04-14 PROCEDURE — 43239 EGD BIOPSY SINGLE/MULTIPLE: CPT | Performed by: INTERNAL MEDICINE

## 2022-04-14 PROCEDURE — 88342 IMHCHEM/IMCYTCHM 1ST ANTB: CPT | Performed by: INTERNAL MEDICINE

## 2022-04-14 PROCEDURE — 25010000002 MIDAZOLAM PER 1 MG: Performed by: NURSE ANESTHETIST, CERTIFIED REGISTERED

## 2022-04-14 RX ORDER — MIDAZOLAM HYDROCHLORIDE 1 MG/ML
INJECTION INTRAMUSCULAR; INTRAVENOUS AS NEEDED
Status: DISCONTINUED | OUTPATIENT
Start: 2022-04-14 | End: 2022-04-14 | Stop reason: SURG

## 2022-04-14 RX ORDER — PROPOFOL 10 MG/ML
VIAL (ML) INTRAVENOUS AS NEEDED
Status: DISCONTINUED | OUTPATIENT
Start: 2022-04-14 | End: 2022-04-14 | Stop reason: SURG

## 2022-04-14 RX ORDER — SODIUM CHLORIDE, SODIUM LACTATE, POTASSIUM CHLORIDE, CALCIUM CHLORIDE 600; 310; 30; 20 MG/100ML; MG/100ML; MG/100ML; MG/100ML
30 INJECTION, SOLUTION INTRAVENOUS CONTINUOUS
Status: DISCONTINUED | OUTPATIENT
Start: 2022-04-14 | End: 2022-04-14 | Stop reason: HOSPADM

## 2022-04-14 RX ORDER — SODIUM CHLORIDE, SODIUM LACTATE, POTASSIUM CHLORIDE, CALCIUM CHLORIDE 600; 310; 30; 20 MG/100ML; MG/100ML; MG/100ML; MG/100ML
1000 INJECTION, SOLUTION INTRAVENOUS CONTINUOUS
Status: DISCONTINUED | OUTPATIENT
Start: 2022-04-14 | End: 2022-04-14 | Stop reason: HOSPADM

## 2022-04-14 RX ORDER — OMEPRAZOLE 40 MG/1
40 CAPSULE, DELAYED RELEASE ORAL DAILY
Qty: 90 CAPSULE | Refills: 0 | Status: SHIPPED | OUTPATIENT
Start: 2022-04-14 | End: 2022-12-28

## 2022-04-14 RX ORDER — LIDOCAINE HYDROCHLORIDE 20 MG/ML
INJECTION, SOLUTION INFILTRATION; PERINEURAL AS NEEDED
Status: DISCONTINUED | OUTPATIENT
Start: 2022-04-14 | End: 2022-04-14 | Stop reason: SURG

## 2022-04-14 RX ADMIN — PROPOFOL 150 MG: 10 INJECTION, EMULSION INTRAVENOUS at 07:25

## 2022-04-14 RX ADMIN — PROPOFOL 250 MCG/KG/MIN: 10 INJECTION, EMULSION INTRAVENOUS at 07:25

## 2022-04-14 RX ADMIN — MIDAZOLAM HYDROCHLORIDE 2 MG: 1 INJECTION, SOLUTION INTRAMUSCULAR; INTRAVENOUS at 07:24

## 2022-04-14 RX ADMIN — LIDOCAINE HYDROCHLORIDE 100 MG: 20 INJECTION, SOLUTION INFILTRATION; PERINEURAL at 07:25

## 2022-04-14 RX ADMIN — SODIUM CHLORIDE, POTASSIUM CHLORIDE, SODIUM LACTATE AND CALCIUM CHLORIDE 30 ML/HR: 600; 310; 30; 20 INJECTION, SOLUTION INTRAVENOUS at 06:48

## 2022-04-14 NOTE — ANESTHESIA PREPROCEDURE EVALUATION
Anesthesia Evaluation     Patient summary reviewed and Nursing notes reviewed   history of anesthetic complications: PONV  NPO Solid Status: > 8 hours  NPO Liquid Status: > 2 hours           Airway   Mallampati: I  TM distance: >3 FB  Neck ROM: full  No difficulty expected  Dental      Pulmonary - negative pulmonary ROS and normal exam    breath sounds clear to auscultation  Cardiovascular - negative cardio ROS and normal exam  Exercise tolerance: good (4-7 METS)    Rhythm: regular  Rate: normal        Neuro/Psych  (+) psychiatric history Anxiety,    GI/Hepatic/Renal/Endo - negative ROS     Musculoskeletal (-) negative ROS    Abdominal    Substance History - negative use     OB/GYN negative ob/gyn ROS         Other - negative ROS                       Anesthesia Plan    ASA 2     general       Anesthetic plan, all risks, benefits, and alternatives have been provided, discussed and informed consent has been obtained with: patient.        CODE STATUS:

## 2022-04-14 NOTE — ANESTHESIA POSTPROCEDURE EVALUATION
Patient: Thomas Lei    Procedure Summary     Date: 04/14/22 Room / Location: [facility] ENDOSCOPY 2 / [facility]COPY    Anesthesia Start: 0723 Anesthesia Stop: 0738    Procedure: ESOPHAGOGASTRODUODENOSCOPY WITH BX (N/A ) Diagnosis:       Esophageal dysphagia      (Esophageal dysphagia [R13.19])    Surgeons: Joan Luna MD Provider: Feroz Oreilly MD    Anesthesia Type: general ASA Status: 2          Anesthesia Type: general    Vitals  Vitals Value Taken Time   /63 04/14/22 0743   Temp     Pulse 69 04/14/22 0744   Resp     SpO2 97 % 04/14/22 0744   Vitals shown include unvalidated device data.        Post Anesthesia Care and Evaluation    Patient location during evaluation: bedside  Patient participation: complete - patient participated  Level of consciousness: awake  Pain management: adequate  Airway patency: patent  Anesthetic complications: No anesthetic complications  PONV Status: none  Cardiovascular status: acceptable and stable  Respiratory status: acceptable  Hydration status: acceptable    Comments: An Anesthesiologist personally participated in the most demanding procedures (including induction and emergence if applicable) in the anesthesia plan, monitored the course of anesthesia administration at frequent intervals and remained physically present and available for immediate diagnosis and treatment of emergencies.

## 2022-04-14 NOTE — H&P
"Pre Procedure History & Physical    Chief Complaint:   dysphagia    Subjective     HPI:   31 yo M here for eval of dysphagia.    Past Medical History:   Past Medical History:   Diagnosis Date   • Anxiety disorder, unspecified    • Concussion     X3   • Dysphagia, unspecified    • Fever, unspecified    • Influenza due to identified novel influenza A virus with other respiratory manifestations    • PONV (postoperative nausea and vomiting)    • Procreative counseling and advice using natural family planning        Past Surgical History:  Past Surgical History:   Procedure Laterality Date   • HERNIA REPAIR      Hernia Repair: at age 17; right inguinal; uncomplicated; dual;   • SHOULDER SURGERY      left shoulder labrum repair at age 22 - sports related injury       Family History:  Family History   Problem Relation Age of Onset   • Sjogren's syndrome Mother    • Diverticulitis Mother    • Cardiomyopathy Father    • Cardiomyopathy Paternal Uncle        Social History:   reports that he has never smoked. His smokeless tobacco use includes chew. He reports current alcohol use. He reports that he does not use drugs.    Medications:   Medications Prior to Admission   Medication Sig Dispense Refill Last Dose   • buPROPion XL (Wellbutrin XL) 150 MG 24 hr tablet Take 1 tablet by mouth Daily. 30 tablet 1 4/13/2022 at Unknown time   • omeprazole (priLOSEC) 20 MG capsule Take 20 mg by mouth As Needed.   Past Month at Unknown time   • sertraline (ZOLOFT) 50 MG tablet Take 1 tablet by mouth Daily. 90 tablet 0 4/13/2022 at Unknown time       Allergies:  Patient has no known allergies.    ROS:    Pertinent items are noted in HPI     Objective     Blood pressure 121/73, pulse 77, temperature 98.4 °F (36.9 °C), temperature source Temporal, resp. rate 18, height 193 cm (76\"), weight 85.8 kg (189 lb 2.5 oz), SpO2 96 %.    Physical Exam   Constitutional: Pt is oriented to person, place, and time and well-developed, well-nourished, and in " no distress.   Mouth/Throat: Oropharynx is clear and moist.   Neck: Normal range of motion.   Cardiovascular: Normal rate, regular rhythm and normal heart sounds.    Pulmonary/Chest: Effort normal and breath sounds normal.   Abdominal: Soft. Nontender  Skin: Skin is warm and dry.   Psychiatric: Mood, memory, affect and judgment normal.     Assessment/Plan     Diagnosis:  Dysphagia    Anticipated Surgical Procedure:  EGD    The risks, benefits, and alternatives of this procedure have been discussed with the patient or the responsible party- the patient understands and agrees to proceed.

## 2022-04-15 LAB
CYTO UR: NORMAL
LAB AP CASE REPORT: NORMAL
LAB AP CLINICAL INFORMATION: NORMAL
LAB AP SPECIAL STAINS: NORMAL
PATH REPORT.FINAL DX SPEC: NORMAL
PATH REPORT.GROSS SPEC: NORMAL

## 2022-04-18 DIAGNOSIS — R41.840 ATTENTION AND CONCENTRATION DEFICIT: ICD-10-CM

## 2022-04-18 RX ORDER — BUPROPION HYDROCHLORIDE 150 MG/1
150 TABLET ORAL DAILY
Qty: 30 TABLET | Refills: 1 | Status: SHIPPED | OUTPATIENT
Start: 2022-04-18 | End: 2022-06-15 | Stop reason: SDUPTHER

## 2022-04-20 DIAGNOSIS — K20.0 ESOPHAGITIS, EOSINOPHILIC: Primary | ICD-10-CM

## 2022-04-20 RX ORDER — BUDESONIDE 0.5 MG/2ML
0.5 INHALANT ORAL
Status: DISCONTINUED | OUTPATIENT
Start: 2022-04-20 | End: 2022-04-25

## 2022-04-21 ENCOUNTER — TELEPHONE (OUTPATIENT)
Dept: GASTROENTEROLOGY | Facility: CLINIC | Age: 32
End: 2022-04-21

## 2022-04-21 ENCOUNTER — PREP FOR SURGERY (OUTPATIENT)
Dept: OTHER | Facility: HOSPITAL | Age: 32
End: 2022-04-21

## 2022-04-21 DIAGNOSIS — R13.19 ESOPHAGEAL DYSPHAGIA: Primary | ICD-10-CM

## 2022-04-21 DIAGNOSIS — K20.0 ESOPHAGITIS, EOSINOPHILIC: ICD-10-CM

## 2022-04-22 ENCOUNTER — TELEPHONE (OUTPATIENT)
Dept: GASTROENTEROLOGY | Facility: CLINIC | Age: 32
End: 2022-04-22

## 2022-04-22 NOTE — TELEPHONE ENCOUNTER
Pt talked with nurse in Endo about medications.  He was concerned steroid was not sent in after procedure.  I explained to him after procedure that there were not obvious indications of active eosinophilic esophagitis, so I wanted to wait until his biopsies returned to determine if steroid even indicated.  Based on his biopsies, Ángel did send in a steroid (budesonide).  This is likely different than what he has tried previously.  Please let me know if any questions.  thanks

## 2022-04-25 RX ORDER — BUDESONIDE 0.5 MG/2ML
0.5 INHALANT ORAL
Status: DISCONTINUED | OUTPATIENT
Start: 2022-04-25 | End: 2022-04-25

## 2022-04-25 RX ORDER — BUDESONIDE 0.5 MG/2ML
0.5 INHALANT ORAL
Status: CANCELLED | OUTPATIENT
Start: 2022-04-25 | End: 2022-06-19

## 2022-04-25 RX ORDER — BUDESONIDE 0.5 MG/2ML
0.5 INHALANT ORAL
Qty: 240 ML | Refills: 0 | Status: SHIPPED | OUTPATIENT
Start: 2022-04-25 | End: 2022-05-30 | Stop reason: SDUPTHER

## 2022-05-16 ENCOUNTER — TELEPHONE (OUTPATIENT)
Dept: GASTROENTEROLOGY | Facility: CLINIC | Age: 32
End: 2022-05-16

## 2022-05-16 RX ORDER — ONDANSETRON 4 MG/1
4 TABLET, FILM COATED ORAL EVERY 8 HOURS PRN
Qty: 10 TABLET | Refills: 0 | Status: SHIPPED | OUTPATIENT
Start: 2022-05-16 | End: 2022-07-11

## 2022-05-16 NOTE — TELEPHONE ENCOUNTER
I have called and spoke to patient with an upcoming procedure reminder. Patient confirmed.     Patient states that after his last egd he became nauseous due to anesthesia and vomited which was painful being post procedure. He is requesting something for nausea. Please advise.      clear

## 2022-05-23 ENCOUNTER — HOSPITAL ENCOUNTER (OUTPATIENT)
Facility: HOSPITAL | Age: 32
Setting detail: HOSPITAL OUTPATIENT SURGERY
Discharge: HOME | End: 2022-05-23
Attending: INTERNAL MEDICINE | Admitting: INTERNAL MEDICINE

## 2022-05-23 ENCOUNTER — ANESTHESIA (OUTPATIENT)
Dept: GASTROENTEROLOGY | Facility: HOSPITAL | Age: 32
End: 2022-05-23

## 2022-05-23 ENCOUNTER — ANESTHESIA EVENT (OUTPATIENT)
Dept: GASTROENTEROLOGY | Facility: HOSPITAL | Age: 32
End: 2022-05-23

## 2022-05-23 VITALS
DIASTOLIC BLOOD PRESSURE: 68 MMHG | WEIGHT: 194.89 LBS | HEART RATE: 69 BPM | SYSTOLIC BLOOD PRESSURE: 113 MMHG | BODY MASS INDEX: 23.72 KG/M2 | OXYGEN SATURATION: 98 % | RESPIRATION RATE: 11 BRPM | TEMPERATURE: 97.5 F

## 2022-05-23 DIAGNOSIS — R13.19 ESOPHAGEAL DYSPHAGIA: ICD-10-CM

## 2022-05-23 PROCEDURE — 43249 ESOPH EGD DILATION <30 MM: CPT | Performed by: INTERNAL MEDICINE

## 2022-05-23 PROCEDURE — 88305 TISSUE EXAM BY PATHOLOGIST: CPT | Performed by: INTERNAL MEDICINE

## 2022-05-23 PROCEDURE — 43239 EGD BIOPSY SINGLE/MULTIPLE: CPT | Performed by: INTERNAL MEDICINE

## 2022-05-23 PROCEDURE — 25010000002 PROPOFOL 10 MG/ML EMULSION: Performed by: NURSE ANESTHETIST, CERTIFIED REGISTERED

## 2022-05-23 PROCEDURE — C1726 CATH, BAL DIL, NON-VASCULAR: HCPCS | Performed by: INTERNAL MEDICINE

## 2022-05-23 RX ORDER — PROPOFOL 10 MG/ML
VIAL (ML) INTRAVENOUS AS NEEDED
Status: DISCONTINUED | OUTPATIENT
Start: 2022-05-23 | End: 2022-05-23 | Stop reason: SURG

## 2022-05-23 RX ORDER — SODIUM CHLORIDE, SODIUM LACTATE, POTASSIUM CHLORIDE, CALCIUM CHLORIDE 600; 310; 30; 20 MG/100ML; MG/100ML; MG/100ML; MG/100ML
30 INJECTION, SOLUTION INTRAVENOUS CONTINUOUS
Status: DISCONTINUED | OUTPATIENT
Start: 2022-05-23 | End: 2022-05-23 | Stop reason: HOSPADM

## 2022-05-23 RX ORDER — SODIUM CHLORIDE, SODIUM LACTATE, POTASSIUM CHLORIDE, CALCIUM CHLORIDE 600; 310; 30; 20 MG/100ML; MG/100ML; MG/100ML; MG/100ML
INJECTION, SOLUTION INTRAVENOUS CONTINUOUS PRN
Status: DISCONTINUED | OUTPATIENT
Start: 2022-05-23 | End: 2022-05-23 | Stop reason: SURG

## 2022-05-23 RX ORDER — LIDOCAINE HYDROCHLORIDE 20 MG/ML
INJECTION, SOLUTION EPIDURAL; INFILTRATION; INTRACAUDAL; PERINEURAL AS NEEDED
Status: DISCONTINUED | OUTPATIENT
Start: 2022-05-23 | End: 2022-05-23 | Stop reason: SURG

## 2022-05-23 RX ADMIN — SODIUM CHLORIDE, POTASSIUM CHLORIDE, SODIUM LACTATE AND CALCIUM CHLORIDE: 600; 310; 30; 20 INJECTION, SOLUTION INTRAVENOUS at 14:22

## 2022-05-23 RX ADMIN — LIDOCAINE HYDROCHLORIDE 100 MG: 20 INJECTION, SOLUTION EPIDURAL; INFILTRATION; INTRACAUDAL; PERINEURAL at 14:19

## 2022-05-23 RX ADMIN — PROPOFOL 100 MG: 10 INJECTION, EMULSION INTRAVENOUS at 14:25

## 2022-05-23 RX ADMIN — PROPOFOL 300 MG: 10 INJECTION, EMULSION INTRAVENOUS at 14:20

## 2022-05-23 RX ADMIN — PROPOFOL 100 MG: 10 INJECTION, EMULSION INTRAVENOUS at 14:30

## 2022-05-23 NOTE — ANESTHESIA POSTPROCEDURE EVALUATION
Patient: Thomas Lei    Procedure Summary     Date: 05/23/22 Room / Location: [facility] ENDOSCOPY 3 / [facility] ENDOSCOPY    Anesthesia Start: 1416 Anesthesia Stop: 1437    Procedure: ESOPHAGOGASTRODUODENOSCOPY WITH 10-15MM BALLOON DILATATION AND BIOPSIES (N/A ) Diagnosis:       Esophageal dysphagia      (Esophageal dysphagia [R13.19])    Surgeons: Joan Luna MD Provider: Cristi Giang MD    Anesthesia Type: general ASA Status: 1          Anesthesia Type: general    Vitals  Vitals Value Taken Time   BP 99/59 05/23/22 1442   Temp 36.4 °C (97.6 °F) 05/23/22 1437   Pulse 65 05/23/22 1442   Resp 17 05/23/22 1442   SpO2 97 % 05/23/22 1442           Post Anesthesia Care and Evaluation    Patient location during evaluation: bedside  Patient participation: complete - patient participated  Level of consciousness: awake  Pain management: adequate  Airway patency: patent  Anesthetic complications: No anesthetic complications  PONV Status: none  Cardiovascular status: acceptable and stable  Respiratory status: acceptable  Hydration status: acceptable    Comments: An Anesthesiologist personally participated in the most demanding procedures (including induction and emergence if applicable) in the anesthesia plan, monitored the course of anesthesia administration at frequent intervals and remained physically present and available for immediate diagnosis and treatment of emergencies.

## 2022-05-23 NOTE — H&P
Pre Procedure History & Physical    Chief Complaint:   dysphagia    Subjective     HPI:   31 yo M here for eval of dysphagia, f/u of esophageal stricture.    Past Medical History:   Past Medical History:   Diagnosis Date   • Anxiety disorder, unspecified    • Concussion     X3   • Dysphagia, unspecified    • Fever, unspecified    • Influenza due to identified novel influenza A virus with other respiratory manifestations    • PONV (postoperative nausea and vomiting)    • Procreative counseling and advice using natural family planning        Past Surgical History:  Past Surgical History:   Procedure Laterality Date   • ENDOSCOPY N/A 4/14/2022    Procedure: ESOPHAGOGASTRODUODENOSCOPY WITH BX;  Surgeon: Joan Luna MD;  Location: [facility] ENDOSCOPY;  Service: Gastroenterology;  Laterality: N/A;  ESOPHAGITIS, GASTRITIS, ESOPHAGEAL STRICTURE   • HERNIA REPAIR      Hernia Repair: at age 17; right inguinal; uncomplicated; dual;   • SHOULDER SURGERY      left shoulder labrum repair at age 22 - sports related injury       Family History:  Family History   Problem Relation Age of Onset   • Sjogren's syndrome Mother    • Diverticulitis Mother    • Cardiomyopathy Father    • Cardiomyopathy Paternal Uncle        Social History:   reports that he has never smoked. His smokeless tobacco use includes chew. He reports current alcohol use. He reports that he does not use drugs.    Medications:   Medications Prior to Admission   Medication Sig Dispense Refill Last Dose   • budesonide (Pulmicort) 0.5 MG/2ML nebulizer solution Mix 2 ampules with 10 packets of splenda and drink twice a day. Nothing by mouth for 30 minutes after drinking. 240 mL 0    • buPROPion XL (Wellbutrin XL) 150 MG 24 hr tablet Take 1 tablet by mouth Daily. 30 tablet 1    • omeprazole (priLOSEC) 40 MG capsule Take 1 capsule by mouth Daily. 90 capsule 0    • ondansetron (Zofran) 4 MG tablet Take 1 tablet by mouth Every 8 (Eight) Hours As Needed for Nausea or  Vomiting 10 tablet 0    • sertraline (ZOLOFT) 50 MG tablet Take 1 tablet by mouth Daily. 90 tablet 0        Allergies:  Patient has no known allergies.    ROS:    Pertinent items are noted in HPI     Objective     Weight 88.4 kg (194 lb 14.2 oz).    Physical Exam   Constitutional: Pt is oriented to person, place, and time and well-developed, well-nourished, and in no distress.   Mouth/Throat: Oropharynx is clear and moist.   Neck: Normal range of motion.   Cardiovascular: Normal rate, regular rhythm and normal heart sounds.    Pulmonary/Chest: Effort normal and breath sounds normal.   Abdominal: Soft. Nontender  Skin: Skin is warm and dry.   Psychiatric: Mood, memory, affect and judgment normal.     Assessment & Plan     Diagnosis:  Dysphagia    Anticipated Surgical Procedure:  EGD    The risks, benefits, and alternatives of this procedure have been discussed with the patient or the responsible party- the patient understands and agrees to proceed.

## 2022-05-23 NOTE — ANESTHESIA PREPROCEDURE EVALUATION
Anesthesia Evaluation     Patient summary reviewed and Nursing notes reviewed   history of anesthetic complications: PONV  NPO Solid Status: > 8 hours  NPO Liquid Status: > 2 hours           Airway   Mallampati: II  TM distance: >3 FB  Neck ROM: full  No difficulty expected  Dental      Pulmonary - negative pulmonary ROS and normal exam    breath sounds clear to auscultation  Cardiovascular - negative cardio ROS and normal exam  Exercise tolerance: excellent (>7 METS)    Rhythm: regular  Rate: normal        Neuro/Psych  (+) psychiatric history Anxiety,    GI/Hepatic/Renal/Endo      Musculoskeletal     Abdominal    Substance History      OB/GYN          Other                        Anesthesia Plan    ASA 1     general   total IV anesthesia    Anesthetic plan, all risks, benefits, and alternatives have been provided, discussed and informed consent has been obtained with: patient.        CODE STATUS:

## 2022-05-25 LAB
CYTO UR: NORMAL
LAB AP CASE REPORT: NORMAL
LAB AP CLINICAL INFORMATION: NORMAL
PATH REPORT.FINAL DX SPEC: NORMAL
PATH REPORT.GROSS SPEC: NORMAL

## 2022-05-30 DIAGNOSIS — K20.0 ESOPHAGITIS, EOSINOPHILIC: Primary | ICD-10-CM

## 2022-05-30 RX ORDER — BUDESONIDE 0.5 MG/2ML
0.5 INHALANT ORAL
Qty: 240 ML | Refills: 0 | Status: SHIPPED | OUTPATIENT
Start: 2022-05-30 | End: 2022-12-28

## 2022-05-31 ENCOUNTER — TELEPHONE (OUTPATIENT)
Dept: GASTROENTEROLOGY | Facility: CLINIC | Age: 32
End: 2022-05-31

## 2022-05-31 NOTE — TELEPHONE ENCOUNTER
----- Message from JUAN Martines sent at 5/30/2022  4:26 PM EDT -----  Eosinophilic esophagitis noted again on esophageal biopsy.  I am sending in a refill of the budesonide slurry in case patient continues to have trouble swallowing despite recent esophageal dilatation.  Please make sure patient has follow-up scheduled.

## 2022-06-10 DIAGNOSIS — F41.9 ANXIETY: Chronic | ICD-10-CM

## 2022-06-15 DIAGNOSIS — R41.840 ATTENTION AND CONCENTRATION DEFICIT: ICD-10-CM

## 2022-06-15 NOTE — TELEPHONE ENCOUNTER
Pt needs an appt per last OV he was to follow up in 6-8 weeks [employer] message sent

## 2022-06-20 RX ORDER — BUPROPION HYDROCHLORIDE 150 MG/1
150 TABLET ORAL DAILY
Qty: 90 TABLET | Refills: 0 | Status: SHIPPED | OUTPATIENT
Start: 2022-06-20 | End: 2022-07-26 | Stop reason: DRUGHIGH

## 2022-07-11 ENCOUNTER — TELEMEDICINE (OUTPATIENT)
Dept: FAMILY MEDICINE CLINIC | Age: 32
End: 2022-07-11

## 2022-07-11 VITALS — BODY MASS INDEX: 23.62 KG/M2 | HEIGHT: 76 IN | WEIGHT: 194 LBS

## 2022-07-11 DIAGNOSIS — R41.840 ATTENTION AND CONCENTRATION DEFICIT: Primary | ICD-10-CM

## 2022-07-11 DIAGNOSIS — F41.9 ANXIETY: Chronic | ICD-10-CM

## 2022-07-11 PROCEDURE — 99214 OFFICE O/P EST MOD 30 MIN: CPT | Performed by: NURSE PRACTITIONER

## 2022-07-11 NOTE — PROGRESS NOTES
"Assessment and Plan    Diagnoses and all orders for this visit:    1. Attention and concentration deficit (Primary)  Comments:  referral katerina for further evaluation   Orders:  -     Ambulatory Referral to Psychiatry    2. Anxiety  Comments:  continue current treatment and follow up in 6 months   Orders:  -     sertraline (ZOLOFT) 50 MG tablet; Take 1 tablet by mouth Daily.  Dispense: 90 tablet; Refill: 1        Follow Up   No follow-ups on file.    Chief Complaint  Thomas Lei presents to [facility] for Anxiety, DOXIMITY ([phone]/WILL PROBABLY JUST DO MYCHART), and Depression    Subjective          Mode of Visit: Video  You have chosen to receive care through a telehealth visit.  Do you consent to use a video/audio connection for your medical care today? YES   Identity has been verified with 2 identifiers - (name and date of birth).    The visit included audio and video interaction. Patient is currently located : home and provider located :  office   No technical issues occurred during this visit.     Was started  On Wellbutrin to see if would improve the attention / concentration problem did not see any improvement in symptoms at all.  He has been taking for 6 months.  He continues on Sertraline daily and is working well.  He has never been on any medication for ADD and wants to get further evaluation            Review of Systems    Objective     Vitals:    07/11/22 1528   Weight: 88 kg (194 lb)   Height: 193 cm (75.98\")     Body mass index is 23.62 kg/m².     Physical Exam  Constitutional:       Appearance: Normal appearance.   HENT:      Head: Normocephalic.   Pulmonary:      Effort: Pulmonary effort is normal.   Musculoskeletal:      Cervical back: Normal range of motion.   Neurological:      Mental Status: He is alert and oriented to person, place, and time.   Psychiatric:         Mood and Affect: Mood normal.         Behavior: Behavior normal.         Result Review " :                    No Known Allergies   Past Medical History:   Diagnosis Date   • Anxiety disorder, unspecified    • Concussion     X3   • Dysphagia, unspecified    • Fever, unspecified    • Influenza due to identified novel influenza A virus with other respiratory manifestations    • PONV (postoperative nausea and vomiting)    • Procreative counseling and advice using natural family planning      Current Outpatient Medications   Medication Sig Dispense Refill   • budesonide (Pulmicort) 0.5 MG/2ML nebulizer solution Mix 2 ampules with 10 packets of splenda and drink twice a day. Nothing by mouth for 30 minutes after drinking. 240 mL 0   • buPROPion XL (Wellbutrin XL) 150 MG 24 hr tablet Take 1 tablet by mouth Daily. 90 tablet 0   • omeprazole (priLOSEC) 40 MG capsule Take 1 capsule by mouth Daily. 90 capsule 0   • sertraline (ZOLOFT) 50 MG tablet Take 1 tablet by mouth Daily. 90 tablet 1     No current facility-administered medications for this visit.     Past Surgical History:   Procedure Laterality Date   • ENDOSCOPY N/A 4/14/2022    Procedure: ESOPHAGOGASTRODUODENOSCOPY WITH BX;  Surgeon: Joan Luna MD;  Location: [facility] ENDOSCOPY;  Service: Gastroenterology;  Laterality: N/A;  ESOPHAGITIS, GASTRITIS, ESOPHAGEAL STRICTURE   • ENDOSCOPY N/A 5/23/2022    Procedure: ESOPHAGOGASTRODUODENOSCOPY WITH 10-15MM BALLOON DILATATION AND BIOPSIES;  Surgeon: Joan Luna MD;  Location: [facility] ENDOSCOPY;  Service: Gastroenterology;  Laterality: N/A;  GASTRITIS, ESOPHAGITIS, ESOPHAGEAL STRICTURE   • HERNIA REPAIR      Hernia Repair: at age 17; right inguinal; uncomplicated; dual;   • SHOULDER SURGERY      left shoulder labrum repair at age 22 - sports related injury      Social History     Tobacco Use   • Smoking status: Never Smoker   • Smokeless tobacco: Former User     Types: Chew     Quit date: 5/21/2022   Vaping Use   • Vaping Use: Never used   Substance Use Topics   • Alcohol use: Yes      Comment: 1-2 drinks per month    • Drug use: Never     Comment: NONE     Family History   Problem Relation Age of Onset   • Sjogren's syndrome Mother    • Diverticulitis Mother    • Cardiomyopathy Father    • Cardiomyopathy Paternal Uncle      Health Maintenance Due   Topic Date Due   • ANNUAL PHYSICAL  Never done   • TDAP/TD VACCINES (1 - Tdap) Never done   • HEPATITIS C SCREENING  Never done   • COVID-19 Vaccine (3 - Booster for Pfizer series) 11/10/2021      Immunization History   Administered Date(s) Administered   • COVID-19 (PFIZER) PURPLE CAP 05/19/2021, 06/10/2021

## 2022-07-26 ENCOUNTER — TELEMEDICINE (OUTPATIENT)
Dept: BEHAVIORAL HEALTH | Facility: CLINIC | Age: 32
End: 2022-07-26

## 2022-07-26 DIAGNOSIS — F41.1 GENERALIZED ANXIETY DISORDER: ICD-10-CM

## 2022-07-26 DIAGNOSIS — R41.840 ATTENTION AND CONCENTRATION DEFICIT: ICD-10-CM

## 2022-07-26 PROCEDURE — 90792 PSYCH DIAG EVAL W/MED SRVCS: CPT | Performed by: NURSE PRACTITIONER

## 2022-07-26 RX ORDER — BUPROPION HYDROCHLORIDE 300 MG/1
300 TABLET ORAL EVERY MORNING
Qty: 30 TABLET | Refills: 2 | Status: SHIPPED | OUTPATIENT
Start: 2022-07-26 | End: 2022-10-21 | Stop reason: SDUPTHER

## 2022-07-26 NOTE — PROGRESS NOTES
This provider is located at [address] [place] [address]. [address], [place] [address]. The Patient is seen remotely using Machine Talkerhart. Patient is being seen via telehealth and confirm that they are in a secure environment for this session. The patient's condition being diagnosed/treated is appropriate for telemedicine. The provider identified himself/herself: herself as well as her credentials.   The patient gave consent to be seen remotely, and when consent is given they understand that the consent allows for patient identifiable information to be sent to a third party as needed.   They may refuse to be seen remotely at any time. The electronic data is encrypted and password protected, and the patient has been advised of the potential risks to privacy not withstanding such measures.    You have chosen to receive care through a telehealth visit.  Do you consent to use a video/audio connection for your medical care today? Yes     Subjective   Thomas Lei is a 32 y.o. 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  Patient has never been evaluated for ADHD. Was started on Zoloft 5/2021 and Wellbutrin  mg was added 2/2022 which was initially effective for anxiety though not as much on attention and focus,  as patient reports anxiety had improved, and no longer having panic attacks feelings of heart racing nor palpitations.     Patient recalls having some anxiety in high school, had a 4.3 GPA and upon starting college grades suffered, and GPA was 2.89.  Patient is currently working as a [employer]. at [employer], and is trying for a  "promotion which has been a struggle due to difficulty focusing on studying for the position. Next month will be the 5 th attempt.         1. ADHD:   a. Elementary school:   i. Grades:B's  ii. Special classes or failures:No  iii. Got in trouble:\"few times for not sitting still or seated, but nothing outside of that\"  iv. Referral for ADHD testing:No  b. Fhx:Brother (Armando)- 2 yrs younger, started on medications at age 8, older Half Brother (Carlton) (8-10 yrs older) possibly diagnosed at preteen age-only lived with patient for 4 yrs; Younger half Brother (Renato)-ASD,ADD  c. Presently:  i. Problems with attention to detail:Yes, at work misses data, errors, have to resend emails  ii. Problems with sustained attention:Yes, \"it's either super hyper focused or exact opposite going from task to task, could be the type of work for the day.\"  iii. Problems listening when spoken to directly:No  iv. Failure to finish tasks: Yes,\"extreme procrastination with reports, my biggest thing right now is I am struggling to study for interviews at [employer] for a propmotion 5 th time in August, struggling to get through the interview, studying piece, will have classical music in background or gets chimes from email and focuses on that instead of studying.\"  v. Avoids tasks that require sustained mental effort:Yes, avoidance of studying for interview, no other times.   vi. Easily distracted:Yes, music, noises of email chimes, phone calls, text messages, \"I am always plugged in because of the job I am on call 24/7, I feel less distracted since I have been working nights, I am less productive vs day shift because I am more involved in operations.\"  vii. Forgetting things:Yes, \"walk into kitchen, open fridge, ask myself what am I doing here, I have a cooler sitting in garage that needs to be thrown in car for one month now. Few times I actually left laptop at home, work is 1 hr in [place], [place].\"  viii. Losing things:Yes, my wallet is a big " "one, my wife finds it for me, I have established specific areas otherwise I will be searching for days, I once lost my car keys for 3 days.\"  ix. Hard to organize:Yes, \"I am getting better, I use one note for tasks, make them by priority which has helped. I am messier, dirty clothes will sit on floor instead of going into hamper.\"  x. Talks a lot and cutting people off:No \"I have tried to become more of a listener rather than listening to respond.\" \"I used to cut people off all the time, so I am getting better at that, it is an effort.\" Patient now takes pre-notes prior to meeting, which data is needed in order to answer questions, \"if I don't have it answered, I can get it to you by the end of the day.\"  xi. Drifts off during conversations:Yes if conversation is not entertaining or when wife talks about grocery, \"I stay very engaged in work meetings because of the level of my position, I make a decision to close my laptop, to limit distraction.\"  xii. Difficulty with Reading:No  xiii. Difficulty watching TV/Movies:Yes, \"If I don't find something exciting, I will go to next movie, series or video.  Even when I am with my parents, I will pull out phone to get more stimulation.\"     In home setting patient admits to wife having to ask several times if listening, due to not interested in topic, \"a lot of the time I hear her kind of, but I think I make a decision to not respond, it's odd.\" Patient has not worked the last 2 days and has made excuses to not change the cat box.  Has some times of using full day of yard work or house hold chores, \"it's either I am extremely productive or I avoid at all cost. It's a lot of excuses.\"  Patient feels avoidance with wife has been ongoing, no changes. Work has made anxiety worse and was started on medications in the last 1.5 yrs.  Patient has had pressure with interviews and promotions.  Patient reports avoiding laundry and would wear all clothing until none remaining, now wife " "does all the laundry.  Recalls only completing house hold tasks, such as the dishes 90% and wife has to finish the remaining 10%, \"I am the same with food, there's always 10% left on the plate.\"      Symptoms include fidgeting (knee bouncing or spins phone on table or pen in hands), easy distractability, inability to complete tasks, difficulty sustaining attention, shifting from one uncompleted activity to another, talking excessively, ineffective listening, frequently losing items, and impulsivity.  Patient reports frequently allow services to home: bug company spraying every 3 months, bought a 65,000 truck while in [place], purchased a 1,200 suit one day which was not thought out, and rarely wears.       PHQ-9 Depression Screening  PHQ-9 Total Score:   7/11/2022 0 , reassess  10/2022    Little interest or pleasure in doing things?     Feeling down, depressed, or hopeless?     Trouble falling or staying asleep, or sleeping too much?     Feeling tired or having little energy?     Poor appetite or overeating?     Feeling bad about yourself - or that you are a failure or have let yourself or your family down?     Trouble concentrating on things, such as reading the newspaper or watching television?     Moving or speaking so slowly that other people could have noticed? Or the opposite - being so fidgety or restless that you have been moving around a lot more than usual?     Thoughts that you would be better off dead, or of hurting yourself in some way?     PHQ-9 Total Score       RAFAEL-7  Feeling nervous, anxious or on edge: (P) Several days  Not being able to stop or control worrying: (P) Not at all  Worrying too much about different things: (P) Not at all  Trouble Relaxing: (P) Not at all  Being so restless that it is hard to sit still: (P) Several days  Feeling afraid as if something awful might happen: (P) Not at all  Becoming easily annoyed or irritable: (P) Not at all  RAFAEL 7 Total Score: (P) 2  If you checked any " problems, how difficult have these problems made it for you to do your work, take care of things at home, or get along with other people: (P) Somewhat difficult    Past Surgical History:  Past Surgical History:   Procedure Laterality Date   • ENDOSCOPY N/A 4/14/2022    Procedure: ESOPHAGOGASTRODUODENOSCOPY WITH BX;  Surgeon: Joan Luna MD;  Location: [facility] ENDOSCOPY;  Service: Gastroenterology;  Laterality: N/A;  ESOPHAGITIS, GASTRITIS, ESOPHAGEAL STRICTURE   • ENDOSCOPY N/A 5/23/2022    Procedure: ESOPHAGOGASTRODUODENOSCOPY WITH 10-15MM BALLOON DILATATION AND BIOPSIES;  Surgeon: Joan Luna MD;  Location: [facility] ENDOSCOPY;  Service: Gastroenterology;  Laterality: N/A;  GASTRITIS, ESOPHAGITIS, ESOPHAGEAL STRICTURE   • HERNIA REPAIR      Hernia Repair: at age 17; right inguinal; uncomplicated; dual;   • SHOULDER SURGERY      left shoulder labrum repair at age 22 - sports related injury       Problem List:  Patient Active Problem List   Diagnosis   • Esophageal dysphagia   • Anxiety   • Attention and concentration deficit       Allergy:   No Known Allergies     Discontinued Medications:  Medications Discontinued During This Encounter   Medication Reason   • sertraline (ZOLOFT) 50 MG tablet *Therapy completed   • buPROPion XL (Wellbutrin XL) 150 MG 24 hr tablet Dose adjustment       Current Medications:   Current Outpatient Medications   Medication Sig Dispense Refill   • budesonide (Pulmicort) 0.5 MG/2ML nebulizer solution Mix 2 ampules with 10 packets of splenda and drink twice a day. Nothing by mouth for 30 minutes after drinking. 240 mL 0   • buPROPion XL (Wellbutrin XL) 300 MG 24 hr tablet Take 1 tablet by mouth Every Morning. 30 tablet 2   • omeprazole (priLOSEC) 40 MG capsule Take 1 capsule by mouth Daily. 90 capsule 0   • sertraline (ZOLOFT) 50 MG tablet Take 1 tablet by mouth Every Morning. 30 tablet 2     No current facility-administered medications for this visit.       Past Medical  History:  Past Medical History:   Diagnosis Date   • Anxiety disorder, unspecified    • Concussion     X3   • Dysphagia, unspecified    • Fever, unspecified    • Head injury    • Influenza due to identified novel influenza A virus with other respiratory manifestations    • Panic disorder    • PONV (postoperative nausea and vomiting)    • Procreative counseling and advice using natural family planning        Past Psychiatric History:  Began Treatment:2021  Diagnoses:Anxiety  Psychiatrist:Denies  Therapist:Denies  Admission History:Denies  Medication Trials:none  Self Harm: Denies  Suicide Attempts:Denies      Substance Abuse History:   Types:Denies all, including illicit  Withdrawal Symptoms:Denies  Longest Period Sober:Not Applicable   AA: Not applicable     Social History:  Martial Status:  Employed:Yes and If so, where [employer] as  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  Kids:Yes or If so, how many 1 (girl) on the way due Dec.2022; 1 dtr age 3 yr and 2 months  House:Lives in a house   History: [id number]  Access to Guns: Yes, put in storage     Social History     Socioeconomic History   • Marital status:    • Number of children: 0   Tobacco Use   • Smoking status: Never Smoker   • Smokeless tobacco: Former User     Types: Chew     Quit date: 5/21/2022   Vaping Use   • Vaping Use: Never used   Substance and Sexual Activity   • Alcohol use: Yes     Comment: 1-2 drinks per month    • Drug use: Not Currently     Types: Marijuana     Comment: back in high school   • Sexual activity: Yes       Family History:   Suicide Attempts: Brother  Suicide Completions:Denies      Family History   Problem Relation Age of Onset   • Sjogren's syndrome Mother    • Diverticulitis Mother    • Cardiomyopathy Father    • Suicide Attempts Brother    • Seizures Brother    • ADD / ADHD Brother    • ADD / ADHD Brother    • Cardiomyopathy Paternal Uncle    • Dementia Maternal  Grandmother        Developmental History:   Born: [place]  Siblings:5   Childhood: Denies Abuse  High School:Completed  College:Bachelors in K[employer]ology kinesiology     Mental Status Exam:   Hygiene:   good  Cooperation:  Cooperative  Eye Contact:  Good  Psychomotor Behavior:  Appropriate  Affect:  Appropriate  Mood: anxious  Speech:  Normal  Thought Process:  Goal directed  Thought Content:  Mood congruent  Suicidal:  None  Homicidal:  None  Hallucinations:  None  Delusion:  None  Memory:  Intact  Orientation:  Person, Place, Time and Situation  Reliability:  good  Insight:  Good  Judgement:  Good  Impulse Control:  Good  Physical/Medical Issues:  Yes Esophageal Dysphagia     Review of Systems:  Review of Systems   Constitutional: Negative for diaphoresis and fatigue.   HENT: Negative for drooling.    Eyes: Negative for visual disturbance.   Respiratory: Negative for cough and shortness of breath.    Cardiovascular: Negative for chest pain, palpitations and leg swelling.   Gastrointestinal: Negative for nausea and vomiting.   Endocrine: Negative for cold intolerance and heat intolerance.   Genitourinary: Negative for difficulty urinating.   Musculoskeletal: Negative for joint swelling.   Allergic/Immunologic: Negative for immunocompromised state.   Neurological: Negative for dizziness, seizures, speech difficulty and numbness.   Psychiatric/Behavioral: Positive for decreased concentration. Negative for hallucinations, self-injury, sleep disturbance and suicidal ideas. The patient is nervous/anxious. The patient is not hyperactive.          Physical Exam:  Physical Exam  Psychiatric:         Attention and Perception: Attention and perception normal.         Mood and Affect: Affect normal. Mood is anxious.         Speech: Speech normal.         Behavior: Behavior normal. Behavior is cooperative.         Thought Content: Thought content normal. Thought content does not include suicidal ideation. Thought content does not  include suicidal plan.         Cognition and Memory: Cognition and memory normal.         Judgment: Judgment normal.         Vital Signs:   There were no vitals taken for this visit.     Lab Results:   Admission on 05/23/2022, Discharged on 05/23/2022   Component Date Value Ref Range Status   • Case Report 05/23/2022    Final                    Value:Surgical Pathology Report                         Case: [id number]                                  Authorizing Provider:  Joan Luna MD Collected:           05/23/2022 02:30 PM          Ordering Location:     [facility] Received:            05/24/2022 04:18 AM                                 SUITES                                                                       Pathologist:           Ashlie Acosta DO                                                       Specimen:    Esophagus, Mid, MID ESOPHAGUS BIOPSIES                                                    • Clinical Information 05/23/2022    Final    This result contains rich text formatting which cannot be displayed here.   • Final Diagnosis 05/23/2022    Final                    Value:This result contains rich text formatting which cannot be displayed here.   • Gross Description 05/23/2022    Final                    Value:This result contains rich text formatting which cannot be displayed here.   • Microscopic Description 05/23/2022    Final    This result contains rich text formatting which cannot be displayed here.   Admission on 04/14/2022, Discharged on 04/14/2022   Component Date Value Ref Range Status   • Case Report 04/14/2022    Final                    Value:Surgical Pathology Report                         Case: [id number]                                  Authorizing Provider:  Joan Luna MD Collected:           04/14/2022 07:28 AM          Ordering Location:     [facility] Received:            04/14/2022 10:07 AM                                  SUITES                                                                       Pathologist:           Fei Regan MD                                                            Specimens:   1) - Gastric, Antrum, GASTRIC ANTRUM BX                                                             2) - Esophagus, Distal, DISTAL ESOPHAGUS BX                                                         3) - Esophagus, Mid, MID ESOPHAGUS BX                                                     • Clinical Information 04/14/2022    Final    This result contains rich text formatting which cannot be displayed here.   • Final Diagnosis 04/14/2022    Final                    Value:This result contains rich text formatting which cannot be displayed here.   • Gross Description 04/14/2022    Final                    Value:This result contains rich text formatting which cannot be displayed here.   • Special Stains 04/14/2022    Final                    Value:This result contains rich text formatting which cannot be displayed here.   • Microscopic Description 04/14/2022    Final    This result contains rich text formatting which cannot be displayed here.       EKG Results:  No orders to display       Imaging Results:  No Images in the past 120 days found..      Assessment & Plan   Diagnoses and all orders for this visit:    1. Attention and concentration deficit  Comments:  Will try him on wellbutrin, have him follow up in 6-8 weeks, if no improvement - refer to Celina Barclay for further recommendations   Orders:  -     buPROPion XL (Wellbutrin XL) 300 MG 24 hr tablet; Take 1 tablet by mouth Every Morning.  Dispense: 30 tablet; Refill: 2  -     Ambulatory Referral to Neuropsychology    2. Generalized anxiety disorder  -     sertraline (ZOLOFT) 50 MG tablet; Take 1 tablet by mouth Every Morning.  Dispense: 30 tablet; Refill: 2  -     buPROPion XL (Wellbutrin XL) 300 MG 24 hr tablet; Take 1 tablet by mouth Every Morning.  Dispense: 30 tablet;  Refill: 2        Visit Diagnoses:    ICD-10-CM ICD-9-CM   1. Attention and concentration deficit  R41.840 799.51   2. Generalized anxiety disorder  F41.1 300.02       PLAN:  1.   Safety: No acute safety concerns  2. Therapy: None  3. Risk Assessment: Risk of self-harm acutely is moderate.  Risk factors include anxiety disorder,family history, access to guns/weapons, and recent psychosocial stressors (pandemic). Protective factors include no present SI, no history of suicide attempts or self-harm in the past, minimal AODA, healthcare seeking, future orientation, willingness to engage in care.  Risk of self-harm chronically is also moderate, but could be further elevated in the event of treatment noncompliance and/or AODA.  4. Meds:  Continue Zoloft 50 mg by mouth daily to target anxiety.  Risks, benefits, alternatives discussed with patient including GI upset, nausea vomiting diarrhea, theoretical decrease of seizure threshold predisposing the patient to a slightly higher seizure risk, headaches, sexual dysfunction, serotonin syndrome, bleeding risk, increased suicidality in patients 24 years and younger.  After discussion of these risks and benefits, the patient voiced understanding and agreed to proceed.  Increase Wellbutrin XL from 150 mg to 300 mg (instructed to start taking 2 tabs of the 150 mg on hand until new prescription picked up) po daily in the morning to target attention and concentration deficit. Discussed all risks, benefits, alternatives, and side effects of Bupropion/Wellbutrin including but not limited to GI upset (N/V/D, constipation), tachycardia, diaphoresis, weight loss, agitation, dizziness, headache, insomnia, tremor, blurred vision, anorexia, HTN, activation of char or hypomania, CNS stimulation and neuropsychiatric effects, ocular effects, seizure risk, withdrawal syndrome following abrupt discontinuation, and activation of suicidal ideation and behavior. Patient  educated on the need to  practice safe sex while taking this med. Discussed the need for patient to immediately call the office for any new or worsening symptoms, such as worsening depression; feeling nervous or restless; suicidal thoughts or actions; or other changes in mood or behavior, and all other concerns. Patient educated on medication compliance. Patient  verbalized understanding and is agreeable to taking Bupropion/Wellbutrin. Addressed all questions and concerns.   5. Labs: n/a  6. Referral for ADHD testing.  Patient to contact provider and set up appointment.  And to contact office if unable to get an appointment scheduled. -Attached list of several other sites for ADHD testing. Patient instructed to contact providers on list to determine availability of appointments, instructed patient to take notes while calling places indicating first available appointment, and to ask to be placed on waiting list.  If an office is chosen and requests the referral order please contact my Medical Assistant, Serene, directly at [phone] informing of chosen office and the information will be sent.    Dr. Eladio Dunn, Psychologist, MS, LPP  [address] [facility], [address]  [place] [id number] [phone]   Currently only accepting referrals for psychological testing services.  Accepts Most insurance plans except Medicare Plans        Patient presentation seems most consistent with anxiety vs ADHD, due to high suspicion of ADHD will refer for formal testing.  Increased Wellbutrin XL today , will see patient back in 4 weeks.       Patient screened positive for depression based on a PHQ-9 score of 0 on 7/11/2022. Follow-up recommendations include: Prescribed antidepressant medication treatment and Suicide Risk Assessment performed.         TREATMENT PLAN/GOALS: Continue supportive psychotherapy efforts and medications as indicated. Treatment and medication options discussed during today's visit. Patient ackowledged and verbally consented  to continue with current treatment plan and was educated on the importance of compliance with treatment and follow-up appointments.    MEDICATION ISSUES:  ANUSHA reviewed as expected.  Discussed medication options and treatment plan of prescribed medication as well as the risks, benefits, and side effects including potential falls, possible impaired driving and metabolic adversities among others. Patient is agreeable to call the office with any worsening of symptoms or onset of side effects. Patient is agreeable to call 911 or go to the nearest ER should he/she begin having SI/HI. No medication side effects or related complaints today.     MEDS ORDERED DURING VISIT:  New Medications Ordered This Visit   Medications   • sertraline (ZOLOFT) 50 MG tablet     Sig: Take 1 tablet by mouth Every Morning.     Dispense:  30 tablet     Refill:  2     File for next refill please, order was mistakenly discontinued from profile   • buPROPion XL (Wellbutrin XL) 300 MG 24 hr tablet     Sig: Take 1 tablet by mouth Every Morning.     Dispense:  30 tablet     Refill:  2       Return in about 4 weeks (around 8/23/2022) for Video visit.         I spent 79 minutes caring for Thomas on this date of service. This time includes time spent by me in the following activities: preparing for the visit, reviewing tests, obtaining and/or reviewing a separately obtained history, performing a medically appropriate examination and/or evaluation, counseling and educating the patient/family/caregiver, ordering medications, tests, or procedures, referring and communicating with other health care professionals, documenting information in the medical record and care coordination.      This document has been electronically signed by JUAN Hayes  July 26, 2022 14:40 EDT      Part of this note may be an electronic transcription/translation of spoken language to printed text using the Dragon Dictation System.

## 2022-07-26 NOTE — PATIENT INSTRUCTIONS
1.  Please return to clinic at your next scheduled visit.  Contact the clinic ([phone]) at least 24 hours prior in the event you need to cancel.  2.  Do no harm to yourself or others.    3.  Avoid alcohol and drugs.    4.  Take all medications as prescribed.  Please contact the clinic with any concerns. If you are in need of medication refills, please call the clinic at [phone].    5. Should you want to get in touch with your provider, JUAN Hayes, please utilize [employer] message or contact the office ([phone]), and staff will be able to page the provider on call directly.  6.  In the event you have personal crisis, contact the following crisis numbers: Suicide Prevention Hotline 1-464.480.6887; JOAQUIM Helpline 0-620-416-RORU; [facility] Emergency Room [phone]; text HELLO to 657754; or 294.  7.  Please feel free to contact my Medical Assistant, Serene, directly at [phone], please leave a voice mail if you do not get an answer, and she will return your call within 24 hrs.      SPECIFIC RECOMMENDATIONS:     1.      Medications discussed at this encounter:                   - Increase Wellbutrin XL from 150 mg to 300 mg     2.      Psychotherapy recommendations: Referral for ADHD testing, Patient to contact provider and set up appointment.  And to contact office if unable to get an appointment scheduled.  -Attached list of several other sites for ADHD testing. Patient instructed to contact providers on list to determine availability of appointments, instructed patient to take notes while calling places indicating first available appointment, and to ask to be placed on waiting list.  If an office is chosen and requests the referral order please contact my Medical Assistant, Serene, directly at [phone] informing of chosen office and the information will be sent.   CALL THIS PERSON FIRST:  then see list below  Dr. Eladio Dunn, Psychologist, MS, P  [address],  [address]  [place] [id number] [phone]   Currently only accepting referrals for psychological testing services.  Accepts Most insurance plans except Medicare Plans      3.     Return to clinic: 4 weeks    Please arrive at least 15 minutes before your scheduled appointment time to complete check in process.               Neuropsychological Services    [facility]  Address: [address] [address]  Phone: [phone]  Other location: [address] [place] [address]. [phone]  Website: [email or web address]  Services offered: Testing and assessments for adult ADHD, intelligence/adaptive functioning, dementia and cognitive impairment. Also provides counseling and psychotherapy for anxiety, depression, trauma, grief, and more.  Insurance accepted: Aetna, [place] [facility]""/[facility], [facility], [employer], [employer], [employer], Humana, Medicare, , United Healthcare/[employer]um [facility], [employer], Medicaid plans (Aetna Better Health, [place] Medicaid, Humana Medicaid, [employer], Passport, Wellcare).    [facility]   Address: [address] [place] [address]  Phone: [phone]  Website: [email or web address]  Services offered: Testing and assessments for adult ADHD, intelligence/adaptive functioning, brain injury, dementia and cognitive impairment. Also provides psychotherapy to individuals, couples, and families for anxiety, depression, adjustment, loss and grief, stress management, emotional distress, and more.  Insurance accepted: Most major insurance companies, including [place], Humana, and Medicare. Not currently participating in United Health Care and do not accept Medicaid.     [facility]  Address: [address], [place] [facility] [address]  Phone: [phone]  Website: [email or web address]  Services offered: Evaluation and  testing for adults with emotional difficulties and/or ADHD, functional impairment of the brain in adults/seniors, and more. Also provides individual, group, family and marital therapy, comprehensive hypnosis services, career counseling, and neurobehavioral family therapy for brain-impaired individuals and their families.  Insurance accepted: Humana, [place], United Health Care, [employer], and most commercial insurance. No Medicaid or Medicare.    Tana Francois with [facility]  Address: [address], [place] [id number] (office is rear entrance on [address] [facility]).  Phone: [phone]  Website: [email or web address]  Services offered: Psychological testing offered and also psychotherapy.   Insurance accepted: Accepts most insurance, including Medicare and Medicaid.     1. [facility]      [phone]     2.  [facility]       Doctoral Students perform ADHD testing and use Sliding Scale payments.         [phone]    3.  [facility]       [phone]    4.  [facility]       [phone]       Web-site includes request appointment portal if unable to reach via phone.

## 2022-10-03 ENCOUNTER — TELEMEDICINE (OUTPATIENT)
Dept: BEHAVIORAL HEALTH | Facility: CLINIC | Age: 32
End: 2022-10-03

## 2022-10-03 DIAGNOSIS — F41.1 GENERALIZED ANXIETY DISORDER: ICD-10-CM

## 2022-10-03 DIAGNOSIS — R41.840 ATTENTION AND CONCENTRATION DEFICIT: ICD-10-CM

## 2022-10-03 PROCEDURE — 99213 OFFICE O/P EST LOW 20 MIN: CPT | Performed by: NURSE PRACTITIONER

## 2022-10-03 NOTE — PATIENT INSTRUCTIONS
"1.  Please return to clinic at your next scheduled visit.  Contact the [facility] ([phone]) or **Serene, Medical Assistant at [place] [facility] directly at [phone] at least 24 hours prior in the event you need to cancel.**    2. Should you want to get in touch with your provider, JUAN Hayes, please contact MY Medical Assistant, Serene, directly at [phone].  Recommend saving Serene's direct number in phone as this is the PREFERRED & EASIEST way to get in contact with your provider.  Please leave a voice mail if you do not get an answer and she will return your call within 24 hrs. You will NOT be able to contact provider on [facility], as Behavioral Health Providers are restricted. YOU MUST CALL [phone]    If you need to speak with the on call provider after hours or on weekends, please Contact the [facility] ([phone]) and staff will be able to page the provider on call directly.        3, MEDICATION REFILLS:  PLEASE CALL THE PHARMACY TO REQUEST ALL MEDICATION REFILLS TO ENSURE YOU ARE RECEIVING YOUR MEDICATIONS IN A TIMELY MANNER.    IF YOU USE AN AUTOMATED SERVICE AT THE PHARMACY FOR REFILLS AND ARE TOLD THERE ARE \"NO REFILLS REMAINING\"   PLEASE CALL THE PHARMACY & SPEAK TO A LIVE PERSON TO VERIFY IT IS THE MOST UP TO DATE PRESCRIPTION ON FILE.    All new prescriptions will have a different number, therefore, if you were given refills for a medication today or at last visit it will not have the same number as the previous prescription.       4.  In the event you have personal crisis, contact the following crisis numbers: Suicide Prevention Hotline 1-478.928.4590 or *988, JOAQUIM Helpline 4-915-483-JOAQUIM; [facility] [phone]; text HELLO to 946179; or 850.      5. We would appreciate your feedback, please scan the QRS code on the back of your appointment card (or see below) and complete a brief survey.  [place] location is still not available, so " "please click \"[place]\" location.  Thank you      SPECIFIC RECOMMENDATIONS:     1.      Medications discussed at this encounter:                   - no changes     2.      Psychotherapy recommendations: Declined     3.     Return to clinic: 4 weeks in person    Please arrive at least 15 minutes before your scheduled appointment time to complete check in process.      IF you are scheduled for a [email or web address] VIDEO visit, PLEASE ANSWER YOUR PHONE WHEN OFFICE CALLS PRIOR TO VISIT TO COMPLETE THE CHECK IN PROCESS, EVEN IF THE E-CHECK IN WAS COMPLETED.     If you would like to log on to [email or web address] and complete the \"E-Check IN\" prior to your visit, please do so, this will speed up the check in process.  If you are due for questionnaires, you will find those on [email or web address] as well, please try to complete prior to your scheduled appointment.          "

## 2022-10-03 NOTE — PROGRESS NOTES
This provider is located at [address]. [address], [place] [address]. The Patient is seen remotely using Galapagoshart. Patient is being seen via telehealth and confirm that they are in a secure environment for this session. The patient's condition being diagnosed/treated is appropriate for telemedicine. The provider identified himself/herself: herself as well as her credentials.   The patient gave consent to be seen remotely, and when consent is given they understand that the consent allows for patient identifiable information to be sent to a third party as needed.   They may refuse to be seen remotely at any time. The electronic data is encrypted and password protected, and the patient has been advised of the potential risks to privacy not withstanding such measures.    You have chosen to receive care through a telehealth visit.  Do you consent to use a video/audio connection for your medical care today? Yes     Subjective   Thomas Lei is a 32 y.o. male who presents today for follow up    Referring Provider:  Otilia Haskins APRN  [id number] [facility]  [address]  [place] [address]    Chief Complaint:  Medication check    History of Present Illness:   10/3/22:  Patient presents today via Galapagoshart Video visit from home, reporting had ADHD testing last week with Dr.Brian Dunn.    Increased Wellbutrin XL to 300 mg at last visit which patient reports at times has been effective.  Recently switched to night shift, and has 1 yr old, however, while on days was able to notice improvement of symptoms after 4-5 hrs.  Patient will be moved to day shift later this month, however, will be starting with a new employer in Nov. 7, 2022.  Patient reports new job will provide more stability, M-F 9 a-5 pm, and flexibility. Will be on same schedule as wife for the first time.    Patient reports taking the Wellbutrin before start of work.  Denies side effects.       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  Patient has never been evaluated for ADHD. Was started on Zoloft 5/2021 and Wellbutrin  mg was added 2/2022 which was initially effective for anxiety though not as much on attention and focus,  as patient reports anxiety had improved, and no longer having panic attacks feelings of heart racing nor palpitations.     Patient recalls having some anxiety in high school, had a 4.3 GPA and upon starting college grades suffered, and GPA was 2.89.  Patient is currently working as a [employer]. at [employer], and is trying for a promotion which has been a struggle due to difficulty focusing on studying for the position. Next month will be the 5 th attempt.         1. ADHD:   a. Elementary school:   i. Grades:B's  ii. Special classes or failures:No  iii. Got in trouble:\"few times for not sitting still or seated, but nothing outside of that\"  iv. Referral for ADHD testing:No  b. Fhx:Brother (Armando)- 2 yrs younger, started on medications at age 8, older Half Brother (Carlton) (8-10 yrs older) possibly diagnosed at preteen age-only lived with patient for 4 yrs; Younger half Brother (Renato)-ASD,ADD  c. Presently:  i. Problems with attention to detail:Yes, at work misses data, errors, have to resend emails  ii. Problems with sustained attention:Yes, \"it's either super hyper focused or exact opposite going from task to task, could be the type of work for the day.\"  iii. Problems listening when spoken to directly:No  iv. Failure to finish tasks: Yes,\"extreme procrastination with reports, my biggest thing right now is I am struggling to study for interviews at [employer] for a propmotion 5 th time in August, struggling to get through the interview, studying piece, will have classical music in background or gets chimes from email and focuses on that " "instead of studying.\"  v. Avoids tasks that require sustained mental effort:Yes, avoidance of studying for interview, no other times.   vi. Easily distracted:Yes, music, noises of email chimes, phone calls, text messages, \"I am always plugged in because of the job I am on call 24/7, I feel less distracted since I have been working nights, I am less productive vs day shift because I am more involved in operations.\"  vii. Forgetting things:Yes, \"walk into kitchen, open fridge, ask myself what am I doing here, I have a cooler sitting in garage that needs to be thrown in car for one month now. Few times I actually left laptop at home, work is 1 hr in [place], [place].\"  viii. Losing things:Yes, my wallet is a big one, my wife finds it for me, I have established specific areas otherwise I will be searching for days, I once lost my car keys for 3 days.\"  ix. Hard to organize:Yes, \"I am getting better, I use one note for tasks, make them by priority which has helped. I am messier, dirty clothes will sit on floor instead of going into hamper.\"  x. Talks a lot and cutting people off:No \"I have tried to become more of a listener rather than listening to respond.\" \"I used to cut people off all the time, so I am getting better at that, it is an effort.\" Patient now takes pre-notes prior to meeting, which data is needed in order to answer questions, \"if I don't have it answered, I can get it to you by the end of the day.\"  xi. Drifts off during conversations:Yes if conversation is not entertaining or when wife talks about grocery, \"I stay very engaged in work meetings because of the level of my position, I make a decision to close my laptop, to limit distraction.\"  xii. Difficulty with Reading:No  xiii. Difficulty watching TV/Movies:Yes, \"If I don't find something exciting, I will go to next movie, series or video.  Even when I am with my parents, I will pull out phone to get more stimulation.\"     In home setting patient " "admits to wife having to ask several times if listening, due to not interested in topic, \"a lot of the time I hear her kind of, but I think I make a decision to not respond, it's odd.\" Patient has not worked the last 2 days and has made excuses to not change the cat box.  Has some times of using full day of yard work or house hold chores, \"it's either I am extremely productive or I avoid at all cost. It's a lot of excuses.\"  Patient feels avoidance with wife has been ongoing, no changes. Work has made anxiety worse and was started on medications in the last 1.5 yrs.  Patient has had pressure with interviews and promotions.  Patient reports avoiding laundry and would wear all clothing until none remaining, now wife does all the laundry.  Recalls only completing house hold tasks, such as the dishes 90% and wife has to finish the remaining 10%, \"I am the same with food, there's always 10% left on the plate.\"      Symptoms include fidgeting (knee bouncing or spins phone on table or pen in hands), easy distractability, inability to complete tasks, difficulty sustaining attention, shifting from one uncompleted activity to another, talking excessively, ineffective listening, frequently losing items, and impulsivity.  Patient reports frequently allow services to home: bug company spraying every 3 months, bought a 65,000 truck while in [employer], purchased a 1,200 suit one day which was not thought out, and rarely wears.       PHQ-9 Depression Screening  PHQ-9 Total Score:   7/11/2022 0 , reassess end of 11/2022    Little interest or pleasure in doing things?     Feeling down, depressed, or hopeless?     Trouble falling or staying asleep, or sleeping too much?     Feeling tired or having little energy?     Poor appetite or overeating?     Feeling bad about yourself - or that you are a failure or have let yourself or your family down?     Trouble concentrating on things, such as reading the newspaper or watching television?   "   Moving or speaking so slowly that other people could have noticed? Or the opposite - being so fidgety or restless that you have been moving around a lot more than usual?     Thoughts that you would be better off dead, or of hurting yourself in some way?     PHQ-9 Total Score       RAFAEL-7    7/26/22 2, reassess end of 11/2022    Past Surgical History:  Past Surgical History:   Procedure Laterality Date   • ENDOSCOPY N/A 4/14/2022    Procedure: ESOPHAGOGASTRODUODENOSCOPY WITH BX;  Surgeon: Joan Luna MD;  Location: [facility] ENDOSCOPY;  Service: Gastroenterology;  Laterality: N/A;  ESOPHAGITIS, GASTRITIS, ESOPHAGEAL STRICTURE   • ENDOSCOPY N/A 5/23/2022    Procedure: ESOPHAGOGASTRODUODENOSCOPY WITH 10-15MM BALLOON DILATATION AND BIOPSIES;  Surgeon: Joan Luna MD;  Location: [facility] ENDOSCOPY;  Service: Gastroenterology;  Laterality: N/A;  GASTRITIS, ESOPHAGITIS, ESOPHAGEAL STRICTURE   • HERNIA REPAIR      Hernia Repair: at age 17; right inguinal; uncomplicated; dual;   • SHOULDER SURGERY      left shoulder labrum repair at age 22 - sports related injury       Problem List:  Patient Active Problem List   Diagnosis   • Esophageal dysphagia   • Anxiety   • Attention and concentration deficit       Allergy:   No Known Allergies     Discontinued Medications:  There are no discontinued medications.    Current Medications:   Current Outpatient Medications   Medication Sig Dispense Refill   • budesonide (Pulmicort) 0.5 MG/2ML nebulizer solution Mix 2 ampules with 10 packets of splenda and drink twice a day. Nothing by mouth for 30 minutes after drinking. 240 mL 0   • buPROPion XL (Wellbutrin XL) 300 MG 24 hr tablet Take 1 tablet by mouth Every Morning. 30 tablet 2   • omeprazole (priLOSEC) 40 MG capsule Take 1 capsule by mouth Daily. 90 capsule 0   • sertraline (ZOLOFT) 50 MG tablet Take 1 tablet by mouth Every Morning. 30 tablet 2     No current facility-administered medications for this visit.        Past Medical History:  Past Medical History:   Diagnosis Date   • Anxiety disorder, unspecified    • Concussion     X3   • Dysphagia, unspecified    • Fever, unspecified    • Head injury    • Influenza due to identified novel influenza A virus with other respiratory manifestations    • Panic disorder    • PONV (postoperative nausea and vomiting)    • Procreative counseling and advice using natural family planning        Past Psychiatric History:  Began Treatment:2021  Diagnoses:Anxiety  Psychiatrist:Denies  Therapist:Denies  Admission History:Denies  Medication Trials:none  Self Harm: Denies  Suicide Attempts:Denies      Substance Abuse History:   Types:Denies all, including illicit  Withdrawal Symptoms:Denies  Longest Period Sober:Not Applicable   AA: Not applicable     Social History:  Martial Status:  Employed:Yes and If so, where [employer] as  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  Kids:Yes or If so, how many 1 (girl) on the way due Dec.2022; 1 dtr age 1 yr and 2 months  House:Lives in a house   History: [id number]  Access to Guns: Yes, put in storage     Social History     Socioeconomic History   • Marital status:    • Number of children: 1   • Years of education: 16   • Highest education level: Bachelor's degree (e.g., BA, AB, BS)   Tobacco Use   • Smoking status: Never Smoker   • Smokeless tobacco: Former User     Types: Chew     Quit date: 5/21/2022   Vaping Use   • Vaping Use: Never used   Substance and Sexual Activity   • Alcohol use: Yes     Comment: 1-2 drinks per month    • Drug use: Not Currently     Types: Marijuana     Comment: back in high school   • Sexual activity: Yes       Family History:   Suicide Attempts: Brother  Suicide Completions:Denies      Family History   Problem Relation Age of Onset   • Sjogren's syndrome Mother    • Diverticulitis Mother    • Cardiomyopathy Father    • Suicide Attempts Brother    • Seizures  Brother    • ADD / ADHD Brother    • ADD / ADHD Brother    • Cardiomyopathy Paternal Uncle    • Dementia Maternal Grandmother        Developmental History:   Born: [place]  Siblings:5   Childhood: Denies Abuse  High School:Completed  College:Bachelors in K[employer]ology kinesiology     Mental Status Exam:   Hygiene:   good  Cooperation:  Cooperative  Eye Contact:  Good  Psychomotor Behavior:  Appropriate  Affect:  Appropriate  Mood: euthymic  Speech:  Normal  Thought Process:  Goal directed  Thought Content:  Mood congruent  Suicidal:  None  Homicidal:  None  Hallucinations:  None  Delusion:  None  Memory:  Intact  Orientation:  Person, Place, Time and Situation  Reliability:  good  Insight:  Good  Judgement:  Good  Impulse Control:  Good  Physical/Medical Issues:  Yes Esophageal Dysphagia     Review of Systems:  Review of Systems   Constitutional: Negative for diaphoresis and fatigue.   HENT: Negative for drooling.    Eyes: Negative for visual disturbance.   Respiratory: Negative for cough and shortness of breath.    Cardiovascular: Negative for chest pain, palpitations and leg swelling.   Gastrointestinal: Negative for nausea and vomiting.   Endocrine: Negative for cold intolerance and heat intolerance.   Genitourinary: Negative for difficulty urinating.   Musculoskeletal: Negative for joint swelling.   Allergic/Immunologic: Negative for immunocompromised state.   Neurological: Positive for tremors. Negative for dizziness, seizures, speech difficulty and numbness.   Psychiatric/Behavioral: Positive for decreased concentration. Negative for hallucinations, self-injury, sleep disturbance and suicidal ideas. The patient is nervous/anxious. The patient is not hyperactive.          Physical Exam:  Physical Exam  Psychiatric:         Attention and Perception: Attention and perception normal.         Mood and Affect: Mood and affect normal.         Speech: Speech normal.         Behavior: Behavior normal. Behavior is cooperative.          Thought Content: Thought content normal. Thought content does not include suicidal ideation. Thought content does not include suicidal plan.         Cognition and Memory: Cognition and memory normal.         Judgment: Judgment normal.         Vital Signs:   There were no vitals taken for this visit.     Lab Results:   Admission on 05/23/2022, Discharged on 05/23/2022   Component Date Value Ref Range Status   • Case Report 05/23/2022    Final                    Value:Surgical Pathology Report                         Case: [id number]                                  Authorizing Provider:  Joan Luna MD Collected:           05/23/2022 02:30 PM          Ordering Location:     [facility] Received:            05/24/2022 04:18 AM                                 SUITES                                                                       Pathologist:           Ashlie Acosta DO                                                       Specimen:    Esophagus, Mid, MID ESOPHAGUS BIOPSIES                                                    • Clinical Information 05/23/2022    Final    This result contains rich text formatting which cannot be displayed here.   • Final Diagnosis 05/23/2022    Final                    Value:This result contains rich text formatting which cannot be displayed here.   • Gross Description 05/23/2022    Final                    Value:This result contains rich text formatting which cannot be displayed here.   • Microscopic Description 05/23/2022    Final    This result contains rich text formatting which cannot be displayed here.   Admission on 04/14/2022, Discharged on 04/14/2022   Component Date Value Ref Range Status   • Case Report 04/14/2022    Final                    Value:Surgical Pathology Report                         Case: [id number]                                  Authorizing Provider:  Joan Luna MD Collected:           04/14/2022 07:28  AM          Ordering Location:     [facility] Received:            04/14/2022 10:07 AM                                 SUITES                                                                       Pathologist:           Fei Regan MD                                                            Specimens:   1) - Gastric, Antrum, GASTRIC ANTRUM BX                                                             2) - Esophagus, Distal, DISTAL ESOPHAGUS BX                                                         3) - Esophagus, Mid, MID ESOPHAGUS BX                                                     • Clinical Information 04/14/2022    Final    This result contains rich text formatting which cannot be displayed here.   • Final Diagnosis 04/14/2022    Final                    Value:This result contains rich text formatting which cannot be displayed here.   • Gross Description 04/14/2022    Final                    Value:This result contains rich text formatting which cannot be displayed here.   • Special Stains 04/14/2022    Final                    Value:This result contains rich text formatting which cannot be displayed here.   • Microscopic Description 04/14/2022    Final    This result contains rich text formatting which cannot be displayed here.       EKG Results:  No orders to display       Imaging Results:  No Images in the past 120 days found..      Assessment & Plan       Visit Diagnoses:    ICD-10-CM ICD-9-CM   1. Attention and concentration deficit  R41.840 799.51   2. Generalized anxiety disorder  F41.1 300.02       PLAN:  1.   Safety: No acute safety concerns  2. Therapy: None  3. Risk Assessment: Risk of self-harm acutely is moderate.  Risk factors include anxiety disorder,family history, access to guns/weapons, and recent psychosocial stressors (pandemic). Protective factors include no present SI, no history of suicide attempts or self-harm in the past, minimal AODA, healthcare seeking, future  orientation, willingness to engage in care.  Risk of self-harm chronically is also moderate, but could be further elevated in the event of treatment noncompliance and/or AODA.  4. Meds:  Continue Zoloft 50 mg by mouth daily to target anxiety.    Continue Wellbutrin  mg po daily in the morning to target attention and concentration deficit.    5. Labs: n/a    Patient denies need for refills at this time, tolerating current medications well without reported side effects.  Patient reported completing ADHD testing last week with Dr. Eladio Dunn, report not expected for at least 2 weeks. Will plan on next appointment in 4 weeks in person due to possible stimulant medication needed, as patient will be starting an new job Nov 6 and would prefer to come in the week prior.  If ADHD report findings indicate no formal diagnosis of ADHD will contact patient prior to next appointment.  Patient to contact provider if symptoms worsen or fail to improve.       7/26/22:   Declined therapy  Continue Zoloft 50 mg by mouth daily to target anxiety.  Risks, benefits, alternatives discussed with patient including GI upset, nausea vomiting diarrhea, theoretical decrease of seizure threshold predisposing the patient to a slightly higher seizure risk, headaches, sexual dysfunction, serotonin syndrome, bleeding risk, increased suicidality in patients 24 years and younger.  After discussion of these risks and benefits, the patient voiced understanding and agreed to proceed.  Increase Wellbutrin XL from 150 mg to 300 mg (instructed to start taking 2 tabs of the 150 mg on hand until new prescription picked up) po daily in the morning to target attention and concentration deficit. Discussed all risks, benefits, alternatives, and side effects of Bupropion/Wellbutrin including but not limited to GI upset (N/V/D, constipation), tachycardia, diaphoresis, weight loss, agitation, dizziness, headache, insomnia, tremor, blurred vision, anorexia,  HTN, activation of char or hypomania, CNS stimulation and neuropsychiatric effects, ocular effects, seizure risk, withdrawal syndrome following abrupt discontinuation, and activation of suicidal ideation and behavior. Patient  educated on the need to practice safe sex while taking this med. Discussed the need for patient to immediately call the office for any new or worsening symptoms, such as worsening depression; feeling nervous or restless; suicidal thoughts or actions; or other changes in mood or behavior, and all other concerns. Patient educated on medication compliance. Patient  verbalized understanding and is agreeable to taking Bupropion/Wellbutrin. Addressed all questions and concerns.     Referral for ADHD testing.  Patient to contact provider and set up appointment.  And to contact office if unable to get an appointment scheduled. -Attached list of several other sites for ADHD testing. Patient instructed to contact providers on list to determine availability of appointments, instructed patient to take notes while calling places indicating first available appointment, and to ask to be placed on waiting list.  If an office is chosen and requests the referral order please contact my Medical Assistant, Serene, directly at [phone] informing of chosen office and the information will be sent.    Dr. Eladio Dunn, Psychologist, MS, LPP  [address], [address]  [place] [id number] [phone]   Currently only accepting referrals for psychological testing services.  Accepts Most insurance plans except Medicare Plans    Patient presentation seems most consistent with anxiety vs ADHD, due to high suspicion of ADHD will refer for formal testing.  Increased Wellbutrin XL today , will see patient back in 4 weeks.       Patient screened positive for depression based on a PHQ-9 score of 0 on 7/11/2022. Follow-up recommendations include: Prescribed antidepressant medication treatment and Suicide Risk  Assessment performed.     TREATMENT PLAN/GOALS: Continue supportive psychotherapy efforts and medications as indicated. Treatment and medication options discussed during today's visit. Patient ackowledged and verbally consented to continue with current treatment plan and was educated on the importance of compliance with treatment and follow-up appointments.    MEDICATION ISSUES:  ANUSHA reviewed as expected.  Discussed medication options and treatment plan of prescribed medication as well as the risks, benefits, and side effects including potential falls, possible impaired driving and metabolic adversities among others. Patient is agreeable to call the office with any worsening of symptoms or onset of side effects. Patient is agreeable to call 911 or go to the nearest ER should he/she begin having SI/HI. No medication side effects or related complaints today.     MEDS ORDERED DURING VISIT:  No orders of the defined types were placed in this encounter.      Return in about 4 weeks (around 10/31/2022) for Next scheduled follow up.         I spent 20 minutes caring for Thomas on this date of service. This time includes time spent by me in the following activities: preparing for the visit, performing a medically appropriate examination and/or evaluation, counseling and educating the patient/family/caregiver, referring and communicating with other health care professionals, documenting information in the medical record and care coordination.      This document has been electronically signed by JUAN Hayes  October 3, 2022 15:56 EDT      Part of this note may be an electronic transcription/translation of spoken language to printed text using the Dragon Dictation System.

## 2022-10-21 DIAGNOSIS — F41.1 GENERALIZED ANXIETY DISORDER: ICD-10-CM

## 2022-10-21 DIAGNOSIS — R41.840 ATTENTION AND CONCENTRATION DEFICIT: ICD-10-CM

## 2022-10-21 RX ORDER — BUPROPION HYDROCHLORIDE 300 MG/1
300 TABLET ORAL EVERY MORNING
Qty: 30 TABLET | Refills: 0 | Status: SHIPPED | OUTPATIENT
Start: 2022-10-21 | End: 2022-11-21 | Stop reason: SDUPTHER

## 2022-11-03 ENCOUNTER — OFFICE VISIT (OUTPATIENT)
Dept: BEHAVIORAL HEALTH | Facility: CLINIC | Age: 32
End: 2022-11-03

## 2022-11-03 ENCOUNTER — CLINICAL SUPPORT (OUTPATIENT)
Dept: FAMILY MEDICINE CLINIC | Age: 32
End: 2022-11-03

## 2022-11-03 ENCOUNTER — TELEPHONE (OUTPATIENT)
Dept: BEHAVIORAL HEALTH | Facility: CLINIC | Age: 32
End: 2022-11-03

## 2022-11-03 VITALS
HEART RATE: 87 BPM | DIASTOLIC BLOOD PRESSURE: 82 MMHG | WEIGHT: 209.6 LBS | BODY MASS INDEX: 25.52 KG/M2 | SYSTOLIC BLOOD PRESSURE: 121 MMHG | HEIGHT: 76 IN

## 2022-11-03 DIAGNOSIS — F90.0 ADHD (ATTENTION DEFICIT HYPERACTIVITY DISORDER), INATTENTIVE TYPE: ICD-10-CM

## 2022-11-03 DIAGNOSIS — F90.0 ADHD (ATTENTION DEFICIT HYPERACTIVITY DISORDER), INATTENTIVE TYPE: Primary | ICD-10-CM

## 2022-11-03 DIAGNOSIS — R41.840 ATTENTION AND CONCENTRATION DEFICIT: Primary | ICD-10-CM

## 2022-11-03 DIAGNOSIS — Z79.899 CONTROLLED SUBSTANCE AGREEMENT SIGNED: ICD-10-CM

## 2022-11-03 DIAGNOSIS — Z79.899 HIGH RISK MEDICATION USE: ICD-10-CM

## 2022-11-03 LAB
AMPHET+METHAMPHET UR QL: NEGATIVE
AMPHETAMINES UR QL: NEGATIVE
BARBITURATES UR QL SCN: NEGATIVE
BENZODIAZ UR QL SCN: NEGATIVE
BUPRENORPHINE SERPL-MCNC: NEGATIVE NG/ML
CANNABINOIDS SERPL QL: NEGATIVE
COCAINE UR QL: NEGATIVE
EXPIRATION DATE: NORMAL
Lab: NORMAL
MDMA UR QL SCN: NEGATIVE
METHADONE UR QL SCN: NEGATIVE
OPIATES UR QL: NEGATIVE
OXYCODONE UR QL SCN: NEGATIVE
PCP UR QL SCN: NEGATIVE

## 2022-11-03 PROCEDURE — 99215 OFFICE O/P EST HI 40 MIN: CPT | Performed by: NURSE PRACTITIONER

## 2022-11-03 PROCEDURE — 80305 DRUG TEST PRSMV DIR OPT OBS: CPT | Performed by: NURSE PRACTITIONER

## 2022-11-03 RX ORDER — DEXTROAMPHETAMINE SACCHARATE, AMPHETAMINE ASPARTATE MONOHYDRATE, DEXTROAMPHETAMINE SULFATE AND AMPHETAMINE SULFATE 2.5; 2.5; 2.5; 2.5 MG/1; MG/1; MG/1; MG/1
10 CAPSULE, EXTENDED RELEASE ORAL EVERY MORNING
Qty: 30 CAPSULE | Refills: 0 | Status: SHIPPED | OUTPATIENT
Start: 2022-11-04 | End: 2022-11-30 | Stop reason: DRUGHIGH

## 2022-11-03 RX ORDER — DEXTROAMPHETAMINE SACCHARATE, AMPHETAMINE ASPARTATE MONOHYDRATE, DEXTROAMPHETAMINE SULFATE AND AMPHETAMINE SULFATE 2.5; 2.5; 2.5; 2.5 MG/1; MG/1; MG/1; MG/1
10 CAPSULE, EXTENDED RELEASE ORAL EVERY MORNING
Qty: 30 CAPSULE | Refills: 0 | Status: SHIPPED | OUTPATIENT
Start: 2022-11-03 | End: 2022-11-03 | Stop reason: SDUPTHER

## 2022-11-03 NOTE — TELEPHONE ENCOUNTER
Patient called back to tell us that he [facility].  Please send there Pharmacy changed and med pended in chart

## 2022-11-03 NOTE — PROGRESS NOTES
Answers for HPI/ROS submitted by the patient on 10/27/2022  What is the primary reason for your visit?: Other  Please describe your symptoms.: Anxiety  Have you had these symptoms before?: Yes  How long have you been having these symptoms?: Greater than 2 weeks      Subjective   Thomas Lei is a 32 y.o. male who presents today for follow up    Referring Provider:  Otilia Haskins, APRN  [address] [facility]  [address]  [place] [address]    Chief Complaint:  ADHD, anxiety    History of Present Illness:   11/3/22:  Patient presents today in office to review ADHD formal testing results and discuss treatment options.  A diagnosed of RAFAEL and ADHD has been confirmed via formal testing on 09/29/2022.     Patient has been doing more meditation and other coping mechanisms -yoga, deep breathing exercises to help manage anxiety, which have been effective.     Patient continues to experience inattentiveness, difficulty with focus, fidgeting, distractability, inability to complete tasks, difficulty sustaining attention, shifting from one uncompleted activity to another, talking excessively, ineffective listening, frequently losing items, and impulsivity.      10/3/22:  Patient presents today via [employer] visit from home, reporting had ADHD testing last week with Dr.Brian Dunn.    Increased Wellbutrin XL to 300 mg at last visit which patient reports at times has been effective.  Recently switched to night shift, and has 1 yr old, however, while on days was able to notice improvement of symptoms after 4-5 hrs.  Patient will be moved to day shift later this month, however, will be starting with a new employer in Nov. 7, 2022.  Patient reports new job will provide more stability, M-F 9 a-5 pm, and flexibility. Will be on same schedule as wife for the first time.    Patient reports taking the Wellbutrin before start of work.  Denies side effects.       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  Patient has never been evaluated for ADHD. Was started on Zoloft 5/2021 and Wellbutrin  mg was added 2/2022 which was initially effective for anxiety though not as much on attention and focus,  as patient reports anxiety had improved, and no longer having panic attacks feelings of heart racing nor palpitations.     Patient recalls having some anxiety in high school, had a 4.3 GPA and upon starting college grades suffered, and GPA was 2.89.  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 Next month will be the 5 th attempt.         1. ADHD:   a. Elementary school:   i. Grades:B's  ii. Special classes or failures:No  iii. Got in trouble:\"few times for not sitting still or seated, but nothing outside of that\"  iv. Referral for ADHD testing:No  b. Fhx:Brother (Armando)- 2 yrs younger, started on medications at age 8, older Half Brother (Carlton) (8-10 yrs older) possibly diagnosed at preteen age-only lived with patient for 4 yrs; Younger half Brother (Renato)-ASD,ADD  c. Presently:  i. Problems with attention to detail:Yes, at work misses data, errors, have to resend emails  ii. Problems with sustained attention:Yes, \"it's either super hyper focused or exact opposite going from task to task, could be the type of work for the day.\"  iii. Problems listening when spoken to directly:No  iv. Failure to finish tasks: Yes,\"extreme procrastination with reports, my biggest thing right now is I am struggling to study for interviews at [employer] for a propmotion 5 th time in August, struggling to get through the interview, studying piece, will have classical music in background or gets chimes from email and focuses on that instead of " "studying.\"  v. Avoids tasks that require sustained mental effort:Yes, avoidance of studying for interview, no other times.   vi. Easily distracted:Yes, music, noises of email chimes, phone calls, text messages, \"I am always plugged in because of the job I am on call 24/7, I feel less distracted since I have been working nights, I am less productive vs day shift because I am more involved in operations.\"  vii. Forgetting things:Yes, \"walk into kitchen, open fridge, ask myself what am I doing here, I have a cooler sitting in garage that needs to be thrown in car for one month now. Few times I actually left laptop at home, work is 1 hr in [place], [place].\"  viii. Losing things:Yes, my wallet is a big one, my wife finds it for me, I have established specific areas otherwise I will be searching for days, I once lost my car keys for 3 days.\"  ix. Hard to organize:Yes, \"I am getting better, I use one note for tasks, make them by priority which has helped. I am messier, dirty clothes will sit on floor instead of going into hamper.\"  x. Talks a lot and cutting people off:No \"I have tried to become more of a listener rather than listening to respond.\" \"I used to cut people off all the time, so I am getting better at that, it is an effort.\" Patient now takes pre-notes prior to meeting, which data is needed in order to answer questions, \"if I don't have it answered, I can get it to you by the end of the day.\"  xi. Drifts off during conversations:Yes if conversation is not entertaining or when wife talks about grocery, \"I stay very engaged in work meetings because of the level of my position, I make a decision to close my laptop, to limit distraction.\"  xii. Difficulty with Reading:No  xiii. Difficulty watching TV/Movies:Yes, \"If I don't find something exciting, I will go to next movie, series or video.  Even when I am with my parents, I will pull out phone to get more stimulation.\"     In home setting patient admits to wife " "having to ask several times if listening, due to not interested in topic, \"a lot of the time I hear her kind of, but I think I make a decision to not respond, it's odd.\" Patient has not worked the last 2 days and has made excuses to not change the cat box.  Has some times of using full day of yard work or house hold chores, \"it's either I am extremely productive or I avoid at all cost. It's a lot of excuses.\"  Patient feels avoidance with wife has been ongoing, no changes. Work has made anxiety worse and was started on medications in the last 1.5 yrs.  Patient has had pressure with interviews and promotions.  Patient reports avoiding laundry and would wear all clothing until none remaining, now wife does all the laundry.  Recalls only completing house hold tasks, such as the dishes 90% and wife has to finish the remaining 10%, \"I am the same with food, there's always 10% left on the plate.\"      Symptoms include fidgeting (knee bouncing or spins phone on table or pen in hands), easy distractability, inability to complete tasks, difficulty sustaining attention, shifting from one uncompleted activity to another, talking excessively, ineffective listening, frequently losing items, and impulsivity.  Patient reports frequently allow services to home: bug company spraying every 3 months, bought a 65,000 truck while in [employer], purchased a 1,200 suit one day which was not thought out, and rarely wears.       PHQ-9 Depression Screening  PHQ-9 Total Score:   7/11/2022 0 , reassess 03/2023    Little interest or pleasure in doing things?     Feeling down, depressed, or hopeless?     Trouble falling or staying asleep, or sleeping too much?     Feeling tired or having little energy?     Poor appetite or overeating?     Feeling bad about yourself - or that you are a failure or have let yourself or your family down?     Trouble concentrating on things, such as reading the newspaper or watching television?     Moving or speaking so " slowly that other people could have noticed? Or the opposite - being so fidgety or restless that you have been moving around a lot more than usual?     Thoughts that you would be better off dead, or of hurting yourself in some way?     PHQ-9 Total Score       RAFAEL-7  Feeling nervous, anxious or on edge: (P) Several days  Not being able to stop or control worrying: (P) Not at all  Worrying too much about different things: (P) Not at all  Trouble Relaxing: (P) Several days  Being so restless that it is hard to sit still: (P) Several days  Feeling afraid as if something awful might happen: (P) Not at all  Becoming easily annoyed or irritable: (P) Not at all  RAFAEL 7 Total Score: (P) 3  If you checked any problems, how difficult have these problems made it for you to do your work, take care of things at home, or get along with other people: (P) Not difficult at all     Past Surgical History:  Past Surgical History:   Procedure Laterality Date   • ENDOSCOPY N/A 4/14/2022    Procedure: ESOPHAGOGASTRODUODENOSCOPY WITH BX;  Surgeon: Joan Luna MD;  Location: [facility] ENDOSCOPY;  Service: Gastroenterology;  Laterality: N/A;  ESOPHAGITIS, GASTRITIS, ESOPHAGEAL STRICTURE   • ENDOSCOPY N/A 5/23/2022    Procedure: ESOPHAGOGASTRODUODENOSCOPY WITH 10-15MM BALLOON DILATATION AND BIOPSIES;  Surgeon: Joan Luna MD;  Location: [facility] ENDOSCOPY;  Service: Gastroenterology;  Laterality: N/A;  GASTRITIS, ESOPHAGITIS, ESOPHAGEAL STRICTURE   • HERNIA REPAIR      Hernia Repair: at age 17; right inguinal; uncomplicated; dual;   • SHOULDER SURGERY      left shoulder labrum repair at age 22 - sports related injury       Problem List:  Patient Active Problem List   Diagnosis   • Esophageal dysphagia   • Anxiety   • Attention and concentration deficit       Allergy:   No Known Allergies     Discontinued Medications:  There are no discontinued medications.    Current Medications:   Current Outpatient Medications   Medication  Sig Dispense Refill   • budesonide (Pulmicort) 0.5 MG/2ML nebulizer solution Mix 2 ampules with 10 packets of splenda and drink twice a day. Nothing by mouth for 30 minutes after drinking. 240 mL 0   • buPROPion XL (Wellbutrin XL) 300 MG 24 hr tablet Take 1 tablet by mouth Every Morning. 30 tablet 0   • omeprazole (priLOSEC) 40 MG capsule Take 1 capsule by mouth Daily. 90 capsule 0   • sertraline (ZOLOFT) 50 MG tablet Take 1 tablet by mouth Every Morning. 30 tablet 2     No current facility-administered medications for this visit.       Past Medical History:  Past Medical History:   Diagnosis Date   • Anxiety disorder, unspecified    • Concussion     X3   • Dysphagia, unspecified    • Fever, unspecified    • Head injury    • Influenza due to identified novel influenza A virus with other respiratory manifestations    • Panic disorder    • PONV (postoperative nausea and vomiting)    • Procreative counseling and advice using natural family planning        Past Psychiatric History:  Began Treatment:2021  Diagnoses:Anxiety  Psychiatrist:Denies  Therapist:Denies  Admission History:Denies  Medication Trials:none  Self Harm: Denies  Suicide Attempts:Denies      Substance Abuse History:   Types:Denies all, including illicit  Withdrawal Symptoms:Denies  Longest Period Sober:Not Applicable   AA: Not applicable     Social History:  Martial Status:  Employed:Yes and If so, where [employer] and . 9a-5p  Kids:Yes or If so, how many 1 (girl) on the way due Dec.2022; 1 dtr age 3 yr and 2 months  House:Lives in a house   History: [id number]  Access to Guns: Yes, put in storage     Social History     Socioeconomic History   • Marital status:    • Number of children: 1   • Years of education: 16   • Highest education level: Bachelor's degree (e.g., BA, AB, BS)   Tobacco Use   • Smoking status: Never   • Smokeless tobacco: Former     Types: Chew     Quit date:  5/21/2022   Vaping Use   • Vaping Use: Never used   Substance and Sexual Activity   • Alcohol use: Yes     Comment: 1-2 drinks per month    • Drug use: Not Currently     Types: Marijuana     Comment: back in high school   • Sexual activity: Yes       Family History:   Suicide Attempts: Brother  Suicide Completions:Denies      Family History   Problem Relation Age of Onset   • Sjogren's syndrome Mother    • Diverticulitis Mother    • Cardiomyopathy Father    • Suicide Attempts Brother    • Seizures Brother    • ADD / ADHD Brother    • ADD / ADHD Brother    • Cardiomyopathy Paternal Uncle    • Dementia Maternal Grandmother        Developmental History:   Born: [place]  Siblings:5   Childhood: Denies Abuse  High School:Completed  College:Bachelors in K[employer]ology kinesiology     Mental Status Exam:   Hygiene:   good  Cooperation:  Cooperative  Eye Contact:  Good  Psychomotor Behavior:  Appropriate  Affect:  Appropriate  Mood: euthymic  Speech:  Normal  Thought Process:  Goal directed  Thought Content:  Mood congruent  Suicidal:  None  Homicidal:  None  Hallucinations:  None  Delusion:  None  Memory:  Intact  Orientation:  Person, Place, Time and Situation  Reliability:  good  Insight:  Good  Judgement:  Good  Impulse Control:  Good  Physical/Medical Issues:  Yes Esophageal Dysphagia     Review of Systems:  Review of Systems   Constitutional: Negative for diaphoresis and fatigue.   HENT: Negative for drooling.    Eyes: Negative for visual disturbance.   Respiratory: Negative for cough and shortness of breath.    Cardiovascular: Negative for chest pain, palpitations and leg swelling.   Gastrointestinal: Negative for nausea and vomiting.   Endocrine: Negative for cold intolerance and heat intolerance.   Genitourinary: Negative for difficulty urinating.   Musculoskeletal: Negative for joint swelling.   Allergic/Immunologic: Negative for immunocompromised state.   Neurological: Negative for dizziness, tremors, seizures, speech  "difficulty and numbness.   Psychiatric/Behavioral: Positive for decreased concentration. Negative for hallucinations, self-injury, sleep disturbance and suicidal ideas. The patient is nervous/anxious. The patient is not hyperactive.          Physical Exam:  Physical Exam  Psychiatric:         Attention and Perception: Attention and perception normal.         Mood and Affect: Mood and affect normal.         Speech: Speech normal.         Behavior: Behavior normal. Behavior is cooperative.         Thought Content: Thought content normal. Thought content does not include suicidal ideation. Thought content does not include suicidal plan.         Cognition and Memory: Cognition and memory normal.         Judgment: Judgment normal.         Vital Signs:   /82   Pulse 87   Ht 193 cm (75.98\")   Wt 95.1 kg (209 lb 9.6 oz)   BMI 25.53 kg/m²      Lab Results:   Admission on 05/23/2022, Discharged on 05/23/2022   Component Date Value Ref Range Status   • Case Report 05/23/2022    Final                    Value:Surgical Pathology Report                         Case: [id number]                                  Authorizing Provider:  Joan Luna MD Collected:           05/23/2022 02:30 PM          Ordering Location:     [facility] Received:            05/24/2022 04:18 AM                                 SUITES                                                                       Pathologist:           Ashlie Acosta DO                                                       Specimen:    Esophagus, Mid, MID ESOPHAGUS BIOPSIES                                                    • Clinical Information 05/23/2022    Final    This result contains rich text formatting which cannot be displayed here.   • Final Diagnosis 05/23/2022    Final                    Value:This result contains rich text formatting which cannot be displayed here.   • Gross Description 05/23/2022    Final                    " Value:This result contains rich text formatting which cannot be displayed here.   • Microscopic Description 05/23/2022    Final    This result contains rich text formatting which cannot be displayed here.       EKG Results:  No orders to display       Imaging Results:  No Images in the past 120 days found..      Assessment & Plan       Visit Diagnoses:    ICD-10-CM ICD-9-CM   1. ADHD (attention deficit hyperactivity disorder), inattentive type  F90.0 314.00   2. Controlled substance agreement signed  Z79.899 V58.69   3. High risk medication use  Z79.899 V58.69       PLAN:  1.   Safety: No acute safety concerns  2. Therapy: None  3. Risk Assessment: Risk of self-harm acutely is moderate.  Risk factors include anxiety disorder,family history, access to guns/weapons, and recent psychosocial stressors (pandemic). Protective factors include no present SI, no history of suicide attempts or self-harm in the past, minimal AODA, healthcare seeking, future orientation, willingness to engage in care.  Risk of self-harm chronically is also moderate, but could be further elevated in the event of treatment noncompliance and/or AODA.  4. Meds:  Continue Zoloft 50 mg by mouth daily to target anxiety.      Continue Wellbutrin  mg po daily in the morning to target attention and concentration deficit.      Start Adderall XR 10 mg by mouth daily in the morning to target symptoms associated with ADHD. Risks, benefits, side effects discussed with patient including elevated heart rate, elevated blood pressure, irritability, insomnia, sexual dysfunction, appetite suppressing properties, psychosis.  After discussion of these risks and benefits, the patient voiced understanding and agreed to proceed. [place] reviewed, UDS ordered, and controlled substance agreement signed & witnessed. Informed patient prescription would not be ordered until UDS results noted as negative, expected results within 30 minutes of providing sample.      5. Labs: POC UDS today in clinic  6. ADHD testing results reviewed from Dr. Eladio Dunn,[facility] dated 9/29/2022 indicating a diagnosis of RAFAEL and ADHD.     Patient to request refills for Wellbutrin XL and Zoloft in approximately 2 weeks, as patient has been educated he may need to decrease Wellbutrin XL dose due to addition of Adderall XR.  Patient to contact provider if symptoms worsen or fail to improve.     10/3/22:   -Continue Zoloft 50 mg by mouth daily to target anxiety.    -Continue Wellbutrin  mg po daily in the morning to target attention and concentration deficit.     Patient denies need for refills at this time, tolerating current medications well without reported side effects.  Patient reported completing ADHD testing last week with Dr. Eladio Dunn, report not expected for at least 2 weeks. Will plan on next appointment in 4 weeks in person due to possible stimulant medication needed, as patient will be starting an new job Nov 6 and would prefer to come in the week prior.  If ADHD report findings indicate no formal diagnosis of ADHD will contact patient prior to next appointment.  Patient to contact provider if symptoms worsen or fail to improve.       7/26/22:   Declined therapy  Continue Zoloft 50 mg by mouth daily to target anxiety.  Risks, benefits, alternatives discussed with patient including GI upset, nausea vomiting diarrhea, theoretical decrease of seizure threshold predisposing the patient to a slightly higher seizure risk, headaches, sexual dysfunction, serotonin syndrome, bleeding risk, increased suicidality in patients 24 years and younger.  After discussion of these risks and benefits, the patient voiced understanding and agreed to proceed.  Increase Wellbutrin XL from 150 mg to 300 mg (instructed to start taking 2 tabs of the 150 mg on hand until new prescription picked up) po daily in the morning to target attention and concentration deficit. Discussed all risks, benefits,  alternatives, and side effects of Bupropion/Wellbutrin including but not limited to GI upset (N/V/D, constipation), tachycardia, diaphoresis, weight loss, agitation, dizziness, headache, insomnia, tremor, blurred vision, anorexia, HTN, activation of char or hypomania, CNS stimulation and neuropsychiatric effects, ocular effects, seizure risk, withdrawal syndrome following abrupt discontinuation, and activation of suicidal ideation and behavior. Patient  educated on the need to practice safe sex while taking this med. Discussed the need for patient to immediately call the office for any new or worsening symptoms, such as worsening depression; feeling nervous or restless; suicidal thoughts or actions; or other changes in mood or behavior, and all other concerns. Patient educated on medication compliance. Patient  verbalized understanding and is agreeable to taking Bupropion/Wellbutrin. Addressed all questions and concerns.     Referral for ADHD testing.  Patient to contact provider and set up appointment.  And to contact office if unable to get an appointment scheduled. -Attached list of several other sites for ADHD testing. Patient instructed to contact providers on list to determine availability of appointments, instructed patient to take notes while calling places indicating first available appointment, and to ask to be placed on waiting list.  If an office is chosen and requests the referral order please contact my Medical Assistant, Serene, directly at [phone] informing of chosen office and the information will be sent.    Dr. Eladio Dunn, Psychologist, MS, LPP  [address], [address]  [place] [address] [phone]   Currently only accepting referrals for psychological testing services.  Accepts Most insurance plans except Medicare Plans    Patient presentation seems most consistent with anxiety vs ADHD, due to high suspicion of ADHD will refer for formal testing.  Increased Wellbutrin  RENNY today , will see patient back in 4 weeks.       Patient screened positive for depression based on a PHQ-9 score of 0 on 7/11/2022. Follow-up recommendations include: Prescribed antidepressant medication treatment and Suicide Risk Assessment performed.     TREATMENT PLAN/GOALS: Continue supportive psychotherapy efforts and medications as indicated. Treatment and medication options discussed during today's visit. Patient ackowledged and verbally consented to continue with current treatment plan and was educated on the importance of compliance with treatment and follow-up appointments.    MEDICATION ISSUES:  ANUSHA reviewed as expected.  Discussed medication options and treatment plan of prescribed medication as well as the risks, benefits, and side effects including potential falls, possible impaired driving and metabolic adversities among others. Patient is agreeable to call the office with any worsening of symptoms or onset of side effects. Patient is agreeable to call 911 or go to the nearest ER should he/she begin having SI/HI. No medication side effects or related complaints today.     MEDS ORDERED DURING VISIT:  No orders of the defined types were placed in this encounter.      Return in about 4 weeks (around 12/1/2022) for Video visit.         I spent 40 minutes caring for Thomas on this date of service. This time includes time spent by me in the following activities: preparing for the visit, reviewing tests, obtaining and/or reviewing a separately obtained history, performing a medically appropriate examination and/or evaluation, counseling and educating the patient/family/caregiver, ordering medications, tests, or procedures, referring and communicating with other health care professionals, documenting information in the medical record and care coordination.      This document has been electronically signed by JUAN Hayes  November 3, 2022 12:19 EDT      Part of this note may be an electronic  transcription/translation of spoken language to printed text using the Dragon Dictation System.

## 2022-11-03 NOTE — PATIENT INSTRUCTIONS
"1.  Please return to clinic at your next scheduled visit.  Contact the [facility] ([phone]) or **Serene, Medical Assistant at [place] Office directly at [phone] at least 24 hours prior in the event you need to cancel.**    2. Should you want to get in touch with your provider, JUAN Hayes, please contact MY Medical Assistant, Serene, directly at [phone].  Recommend saving Serene's direct number in phone as this is the PREFERRED & EASIEST way to get in contact with your provider.  Please leave a voice mail if you do not get an answer and she will return your call within 24 hrs. You will NOT be able to contact provider on [facility], as Behavioral Health Providers are restricted. YOU MUST CALL [phone]    If you need to speak with the on call provider after hours or on weekends, please Contact the [facility] ([phone]) and staff will be able to page the provider on call directly.        3, MEDICATION REFILLS:  PLEASE CALL THE PHARMACY TO REQUEST ALL MEDICATION REFILLS TO ENSURE YOU ARE RECEIVING YOUR MEDICATIONS IN A TIMELY MANNER.    IF YOU USE AN AUTOMATED SERVICE AT THE PHARMACY FOR REFILLS AND ARE TOLD THERE ARE \"NO REFILLS REMAINING\"   PLEASE CALL THE PHARMACY & SPEAK TO A LIVE PERSON TO VERIFY IT IS THE MOST UP TO DATE PRESCRIPTION ON FILE.    All new prescriptions will have a different number, therefore, if you were given refills for a medication today or at last visit it will not have the same number as the previous prescription.       4.  In the event you have personal crisis, contact the following crisis numbers: Suicide Prevention Hotline 1-812.443.3646 or *988, JOAQUIM Helpline 4-887-150-JOAQUIM; [facility] Emergency Room [phone]; text HELLO to 407164; or 663.      5. We would appreciate your feedback, please scan the QRS code on the back of your appointment card (or see below) and complete a brief survey.  [place] location is still not available, so " "please click \"[place]\" location.  Thank you      SPECIFIC RECOMMENDATIONS:     1.      Medications discussed at this encounter:                   - Start Adderall XR 10 mg by mouth daily in the morning, pending urine drug screen results.      2.      Psychotherapy recommendations: Declined     3.     Return to clinic: 4 weeks    4.  Urine drug screen today in clinic, expected to have results within 30 minutes    Please arrive at least 15 minutes before your scheduled appointment time to complete check in process.      IF you are scheduled for a [employer] visit, PLEASE ANSWER YOUR PHONE WHEN OFFICE CALLS PRIOR TO VISIT TO COMPLETE THE CHECK IN PROCESS, EVEN IF THE E-CHECK IN WAS COMPLETED.     If you would like to log on to [employer] and complete the \"E-Check IN\" prior to your visit, please do so, this will speed up the check in process.  If you are due for questionnaires, you will find those on [employer] as well, please try to complete prior to your scheduled appointment.          "

## 2022-11-03 NOTE — PROGRESS NOTES
Confirmed UDS results as negative, will proceed with order for Adderall XR as discussed today in office.

## 2022-11-16 DIAGNOSIS — F41.1 GENERALIZED ANXIETY DISORDER: ICD-10-CM

## 2022-11-21 DIAGNOSIS — F41.1 GENERALIZED ANXIETY DISORDER: ICD-10-CM

## 2022-11-21 DIAGNOSIS — R41.840 ATTENTION AND CONCENTRATION DEFICIT: ICD-10-CM

## 2022-11-21 RX ORDER — BUPROPION HYDROCHLORIDE 300 MG/1
300 TABLET ORAL EVERY MORNING
Qty: 30 TABLET | Refills: 0 | Status: SHIPPED | OUTPATIENT
Start: 2022-11-21 | End: 2022-12-16 | Stop reason: SDUPTHER

## 2022-11-21 NOTE — TELEPHONE ENCOUNTER
Atypical Antidepressants Protocol Passed 11/21/2022 06:08 AM    PHQ-2 or PHQ9 on record within past 12 months    Visit with authorizing provider in past 12 months or upcoming 30 days        NEXT APPT WITH PROVIDER  Appointment with Celina Barclay APRN (11/30/2022)    LAST MED REFILL  buPROPion XL (Wellbutrin XL) 300 MG 24 hr tablet (10/21/2022)    PROVIDER PLEASE ADVISE

## 2022-11-30 ENCOUNTER — TELEMEDICINE (OUTPATIENT)
Dept: PSYCHIATRY | Facility: CLINIC | Age: 32
End: 2022-11-30

## 2022-11-30 DIAGNOSIS — F90.0 ADHD (ATTENTION DEFICIT HYPERACTIVITY DISORDER), INATTENTIVE TYPE: Primary | ICD-10-CM

## 2022-11-30 PROCEDURE — 99213 OFFICE O/P EST LOW 20 MIN: CPT | Performed by: NURSE PRACTITIONER

## 2022-11-30 RX ORDER — DEXTROAMPHETAMINE SACCHARATE, AMPHETAMINE ASPARTATE MONOHYDRATE, DEXTROAMPHETAMINE SULFATE AND AMPHETAMINE SULFATE 3.75; 3.75; 3.75; 3.75 MG/1; MG/1; MG/1; MG/1
15 CAPSULE, EXTENDED RELEASE ORAL EVERY MORNING
Qty: 30 CAPSULE | Refills: 0 | Status: SHIPPED | OUTPATIENT
Start: 2022-11-30 | End: 2022-12-28 | Stop reason: DRUGHIGH

## 2022-11-30 NOTE — PROGRESS NOTES
This provider is located [place] [address] in closed office to ensure privacy. The Patient is seen remotely using CertiVox[employer]. Patient is being seen via telehealth and confirm that they are in a secure environment for this session. The patient's condition being diagnosed/treated is appropriate for telemedicine. The provider identified himself/herself: herself as well as her credentials.   The patient gave consent to be seen remotely, and when consent is given they understand that the consent allows for patient identifiable information to be sent to a third party as needed.   They may refuse to be seen remotely at any time. The electronic data is encrypted and password protected, and the patient has been advised of the potential risks to privacy not withstanding such measures.    You have chosen to receive care through a telehealth visit.  Do you consent to use a video/audio connection for your medical care today? Yes     Subjective   Thomas Lei is a 32 y.o. male who presents today for follow up    Referring Provider:  Otilia Haskins, APRN  [address] [address]    Chief Complaint:  ADHD medication check    History of Present Illness:   11/30/22:  Patient presents today via MyChart Video visit from hospital, as patient wife had baby girl on Monday 11/28/22.  Adderall XR 10 mg was started at last visit, for which patient did have difficulty obtaining initially due to pharmacy shortage.  Patient was able to start medication on 11/3/22.  Patient voices some improvement with Adderall XR, additional focus ability, however, around 3-4 pm has been experiencing some grogginess. Takes daily at 8 am.  Minimal decrease in appetite, denies difficulty with sleep or elevated heart rate.     Patient continues to experience symptoms of inattentiveness, fidgeting, easy distractability, inability to complete tasks, difficulty sustaining attention, shifting from one  uncompleted activity to another, talking excessively, ineffective listening, frequently losing items, and impulsivity.     Patient has started new job which is going well, patient does get 12 weeks of paid leave and will be home with wife and other daughter.    Patient reports tolerating Adderall XR with Wellbutrin  mg, denies side effects.         11/3/22:  Patient presents today in office to review ADHD formal testing results and discuss treatment options.  A diagnosed of RAFAEL and ADHD has been confirmed via formal testing on 09/29/2022.     Patient has been doing more meditation and other coping mechanisms -yoga, deep breathing exercises to help manage anxiety, which have been effective.     Patient continues to experience inattentiveness, difficulty with focus, fidgeting, distractability, inability to complete tasks, difficulty sustaining attention, shifting from one uncompleted activity to another, talking excessively, ineffective listening, frequently losing items, and impulsivity.      10/3/22:  Patient presents today via GetJarhart Video visit from home, reporting had ADHD testing last week with Dr.Brian Dunn.    Increased Wellbutrin XL to 300 mg at last visit which patient reports at times has been effective.  Recently switched to night shift, and has 1 yr old, however, while on days was able to notice improvement of symptoms after 4-5 hrs.  Patient will be moved to day shift later this month, however, will be starting with a new employer in Nov. 7, 2022.  Patient reports new job will provide more stability, M-F 9 a-5 pm, and flexibility. Will be on same schedule as wife for the first time.    Patient reports taking the Wellbutrin before start of work.  Denies side effects.       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  Patient has never been evaluated for ADHD. Was started on Zoloft 5/2021 and Wellbutrin  mg was added 2/2022 which was initially effective for anxiety though not as much on attention and focus,  as patient reports anxiety had improved, and no longer having panic attacks feelings of heart racing nor palpitations.     Patient recalls having some anxiety in high school, had a 4.3 GPA and upon starting college grades suffered, and GPA was 2.89.  Patient is currently working as a [employer]. at [employer], and is trying for a promotion which has been a struggle due to difficulty focusing on studying for the position. Next month will be the 5 th attempt.         1. ADHD:   a. Elementary school:   i. Grades:B's  ii. Special classes or failures:No  iii. Got in trouble:\"few times for not sitting still or seated, but nothing outside of that\"  iv. Referral for ADHD testing:No  b. Fhx:Brother (Armando)- 2 yrs younger, started on medications at age 8, older Half Brother (Carlton) (8-10 yrs older) possibly diagnosed at preteen age-only lived with patient for 4 yrs; Younger half Brother (Renato)-ASD,ADD  c. Presently:  i. Problems with attention to detail:Yes, at work misses data, errors, have to resend emails  ii. Problems with sustained attention:Yes, \"it's either super hyper focused or exact opposite going from task to task, could be the type of work for the day.\"  iii. Problems listening when spoken to directly:No  iv. Failure to finish tasks: Yes,\"extreme procrastination with reports, my biggest thing right now is I am struggling to study for interviews at [employer] for a propmotion 5 th time in August, struggling to get through the interview, studying piece, will have classical music in background or gets chimes from email and focuses on that instead of studying.\"  v. Avoids tasks that require sustained mental effort:Yes, avoidance of studying for interview, no other times.   vi. Easily distracted:Yes, music, " "noises of email chimes, phone calls, text messages, \"I am always plugged in because of the job I am on call 24/7, I feel less distracted since I have been working nights, I am less productive vs day shift because I am more involved in operations.\"  vii. Forgetting things:Yes, \"walk into kitchen, open fridge, ask myself what am I doing here, I have a cooler sitting in garage that needs to be thrown in car for one month now. Few times I actually left laptop at home, work is 1 hr in [place], [place].\"  viii. Losing things:Yes, my wallet is a big one, my wife finds it for me, I have established specific areas otherwise I will be searching for days, I once lost my car keys for 3 days.\"  ix. Hard to organize:Yes, \"I am getting better, I use one note for tasks, make them by priority which has helped. I am messier, dirty clothes will sit on floor instead of going into hamper.\"  x. Talks a lot and cutting people off:No \"I have tried to become more of a listener rather than listening to respond.\" \"I used to cut people off all the time, so I am getting better at that, it is an effort.\" Patient now takes pre-notes prior to meeting, which data is needed in order to answer questions, \"if I don't have it answered, I can get it to you by the end of the day.\"  xi. Drifts off during conversations:Yes if conversation is not entertaining or when wife talks about grocery, \"I stay very engaged in work meetings because of the level of my position, I make a decision to close my laptop, to limit distraction.\"  xii. Difficulty with Reading:No  xiii. Difficulty watching TV/Movies:Yes, \"If I don't find something exciting, I will go to next movie, series or video.  Even when I am with my parents, I will pull out phone to get more stimulation.\"     In home setting patient admits to wife having to ask several times if listening, due to not interested in topic, \"a lot of the time I hear her kind of, but I think I make a decision to not respond, " "it's odd.\" Patient has not worked the last 2 days and has made excuses to not change the cat box.  Has some times of using full day of yard work or house hold chores, \"it's either I am extremely productive or I avoid at all cost. It's a lot of excuses.\"  Patient feels avoidance with wife has been ongoing, no changes. Work has made anxiety worse and was started on medications in the last 1.5 yrs.  Patient has had pressure with interviews and promotions.  Patient reports avoiding laundry and would wear all clothing until none remaining, now wife does all the laundry.  Recalls only completing house hold tasks, such as the dishes 90% and wife has to finish the remaining 10%, \"I am the same with food, there's always 10% left on the plate.\"      Symptoms include fidgeting (knee bouncing or spins phone on table or pen in hands), easy distractability, inability to complete tasks, difficulty sustaining attention, shifting from one uncompleted activity to another, talking excessively, ineffective listening, frequently losing items, and impulsivity.  Patient reports frequently allow services to home: bug company spraying every 3 months, bought a 65,000 truck while in [employer], purchased a 1,200 suit one day which was not thought out, and rarely wears.       PHQ-9 Depression Screening  PHQ-9 Total Score:   7/11/2022 0 , reassess 03/2023    Little interest or pleasure in doing things?     Feeling down, depressed, or hopeless?     Trouble falling or staying asleep, or sleeping too much?     Feeling tired or having little energy?     Poor appetite or overeating?     Feeling bad about yourself - or that you are a failure or have let yourself or your family down?     Trouble concentrating on things, such as reading the newspaper or watching television?     Moving or speaking so slowly that other people could have noticed? Or the opposite - being so fidgety or restless that you have been moving around a lot more than usual?     Thoughts " that you would be better off dead, or of hurting yourself in some way?     PHQ-9 Total Score       RAFAEL-7    11/3/22 3, reassess 03/2023    Past Surgical History:  Past Surgical History:   Procedure Laterality Date   • ENDOSCOPY N/A 4/14/2022    Procedure: ESOPHAGOGASTRODUODENOSCOPY WITH BX;  Surgeon: Joan Luna MD;  Location: [facility] ENDOSCOPY;  Service: Gastroenterology;  Laterality: N/A;  ESOPHAGITIS, GASTRITIS, ESOPHAGEAL STRICTURE   • ENDOSCOPY N/A 5/23/2022    Procedure: ESOPHAGOGASTRODUODENOSCOPY WITH 10-15MM BALLOON DILATATION AND BIOPSIES;  Surgeon: Joan Luna MD;  Location: [facility] ENDOSCOPY;  Service: Gastroenterology;  Laterality: N/A;  GASTRITIS, ESOPHAGITIS, ESOPHAGEAL STRICTURE   • HERNIA REPAIR      Hernia Repair: at age 17; right inguinal; uncomplicated; dual;   • SHOULDER SURGERY      left shoulder labrum repair at age 22 - sports related injury       Problem List:  Patient Active Problem List   Diagnosis   • Esophageal dysphagia   • Anxiety   • Attention and concentration deficit       Allergy:   No Known Allergies     Discontinued Medications:  There are no discontinued medications.    Current Medications:   Current Outpatient Medications   Medication Sig Dispense Refill   • amphetamine-dextroamphetamine XR (Adderall XR) 10 MG 24 hr capsule Take 1 capsule by mouth Every Morning for 30 days Indications: Attention Deficit Hyperactivity Disorder 30 capsule 0   • budesonide (Pulmicort) 0.5 MG/2ML nebulizer solution Mix 2 ampules with 10 packets of splenda and drink twice a day. Nothing by mouth for 30 minutes after drinking. 240 mL 0   • buPROPion XL (Wellbutrin XL) 300 MG 24 hr tablet Take 1 tablet by mouth Every Morning. 30 tablet 0   • omeprazole (priLOSEC) 40 MG capsule Take 1 capsule by mouth Daily. 90 capsule 0   • sertraline (ZOLOFT) 50 MG tablet Take 1 tablet by mouth Every Morning. 30 tablet 2     No current facility-administered medications for this visit.       Past  Medical History:  Past Medical History:   Diagnosis Date   • Anxiety disorder, unspecified    • Concussion     X3   • Dysphagia, unspecified    • Fever, unspecified    • Head injury    • Influenza due to identified novel influenza A virus with other respiratory manifestations    • Panic disorder    • PONV (postoperative nausea and vomiting)    • Procreative counseling and advice using natural family planning        Past Psychiatric History:  Began Treatment:2021  Diagnoses:Anxiety  Psychiatrist:Denies  Therapist:Denies  Admission History:Denies  Medication Trials:none  Self Harm: Denies  Suicide Attempts:Denies      Substance Abuse History:   Types:Denies all, including illicit  Withdrawal Symptoms:Denies  Longest Period Sober:Not Applicable   AA: Not applicable     Social History:  Martial Status:  Employed:Yes and If so, where [employer] and . 9a-5p  Kids:Yes or If so, how many 2 girls    House:Lives in a house   History: [id number]  Access to Guns: Yes, put in storage not in home    Social History     Socioeconomic History   • Marital status:    • Number of children: 1   • Years of education: 16   • Highest education level: Bachelor's degree (e.g., BA, AB, BS)   Tobacco Use   • Smoking status: Never   • Smokeless tobacco: Former     Types: Chew     Quit date: 5/21/2022   Vaping Use   • Vaping Use: Never used   Substance and Sexual Activity   • Alcohol use: Yes     Comment: 1-2 drinks per month    • Drug use: Not Currently     Types: Marijuana     Comment: back in high school   • Sexual activity: Yes       Family History:   Suicide Attempts: Brother  Suicide Completions:Denies      Family History   Problem Relation Age of Onset   • Sjogren's syndrome Mother    • Diverticulitis Mother    • Cardiomyopathy Father    • Suicide Attempts Brother    • Seizures Brother    • ADD / ADHD Brother    • ADD / ADHD Brother    • Cardiomyopathy Paternal  Uncle    • Dementia Maternal Grandmother        Developmental History:   Born: [place]  Siblings:5   Childhood: Denies Abuse  High School:Completed  College:Bachelors in [employer] kinesiology     Mental Status Exam:   Hygiene:   good  Cooperation:  Cooperative  Eye Contact:  Good  Psychomotor Behavior:  Appropriate  Affect:  Appropriate  Mood: euthymic  Speech:  Normal  Thought Process:  Goal directed  Thought Content:  Mood congruent  Suicidal:  None  Homicidal:  None  Hallucinations:  None  Delusion:  None  Memory:  Intact  Orientation:  Person, Place, Time and Situation  Reliability:  good  Insight:  Good  Judgement:  Good  Impulse Control:  Good  Physical/Medical Issues:  Yes Esophageal Dysphagia     Review of Systems:  Review of Systems   Constitutional: Negative for diaphoresis and fatigue.   HENT: Negative for drooling.    Eyes: Negative for visual disturbance.   Respiratory: Negative for cough and shortness of breath.    Cardiovascular: Negative for chest pain, palpitations and leg swelling.   Gastrointestinal: Negative for nausea and vomiting.   Endocrine: Negative for cold intolerance and heat intolerance.   Genitourinary: Negative for difficulty urinating.   Musculoskeletal: Negative for joint swelling.   Allergic/Immunologic: Negative for immunocompromised state.   Neurological: Negative for dizziness, tremors, seizures, speech difficulty and numbness.   Psychiatric/Behavioral: Positive for decreased concentration. Negative for hallucinations, self-injury, sleep disturbance and suicidal ideas. The patient is not nervous/anxious and is not hyperactive.          Physical Exam:  Physical Exam  Psychiatric:         Attention and Perception: Attention and perception normal.         Mood and Affect: Mood and affect normal.         Speech: Speech normal.         Behavior: Behavior normal. Behavior is cooperative.         Thought Content: Thought content normal. Thought content does not include suicidal ideation.  Thought content does not include suicidal plan.         Cognition and Memory: Cognition and memory normal.         Judgment: Judgment normal.         Vital Signs:   There were no vitals taken for this visit.     Lab Results:   Clinical Support on 11/03/2022   Component Date Value Ref Range Status   • Amphetamine Screen, Urine 11/03/2022 Negative  Negative Final   • Barbiturates Screen, Urine 11/03/2022 Negative  Negative Final   • Buprenorphine, Screen, Urine 11/03/2022 Negative  Negative Final   • Benzodiazepine Screen, Urine 11/03/2022 Negative  Negative Final   • Cocaine Screen, Urine 11/03/2022 Negative  Negative Final   • MDMA (ECSTASY) 11/03/2022 Negative  Negative Final   • Methamphetamine, Ur 11/03/2022 Negative  Negative Final   • Methadone Screen, Urine 11/03/2022 Negative  Negative Final   • Opiate Screen 11/03/2022 Negative  Negative Final   • Oxycodone Screen, Urine 11/03/2022 Negative  Negative Final   • Phencyclidine (PCP), Urine 11/03/2022 Negative  Negative Final   • THC, Screen, Urine 11/03/2022 Negative  Negative Final   • Lot Number 11/03/2022 [id number]   Final   • Expiration Date 11/03/2022 02/29/2024   Final       EKG Results:  No orders to display       Imaging Results:  No Images in the past 120 days found..      Assessment & Plan       Visit Diagnoses:    ICD-10-CM ICD-9-CM   1. ADHD (attention deficit hyperactivity disorder), inattentive type  F90.0 314.00       PLAN:  1.   Safety: No acute safety concerns  2. Therapy: None  3. Risk Assessment: Risk of self-harm acutely is moderate.  Risk factors include anxiety disorder,family history, access to guns/weapons, and recent psychosocial stressors (pandemic). Protective factors include no present SI, no history of suicide attempts or self-harm in the past, minimal AODA, healthcare seeking, future orientation, willingness to engage in care.  Risk of self-harm chronically is also moderate, but could be further elevated in the event of treatment  noncompliance and/or AODA.  4. Meds:  Continue Zoloft 50 mg by mouth daily to target anxiety.      Continue Wellbutrin  mg po daily in the morning to target attention and concentration deficit.  Instructed patient to request refill in 2-3 weeks from pharmacy if continues to tolerate with Adderall XR, otherwise to call office and request dose decrease to 150 mg.     Increase Adderall XR from 10 mg TO 15 mg by mouth daily in the morning to target symptoms associated with ADHD. Risks, benefits, side effects discussed with patient including elevated heart rate, elevated blood pressure, irritability, insomnia, sexual dysfunction, appetite suppressing properties, psychosis.  After discussion of these risks and benefits, the patient voiced understanding and agreed to proceed. Channing reviewed, last dispensed 10 mg dose 11/3/22 #30/30 days.  Informed patient order would be sent to Dr. Singh in separate entry for signature due to EMR system, and will not see as refilled on AVS today.  Controlled substance documentation: Channing reviewed; prior urine drug screen consistent obtained 11/3/22; consent is up to date, signed, witnessed and in EHR, dated 11/3/22, which will be updated annually per policy. Patient is aware of risk of addiction on this medication, understands need to follow up for a review every 3 months and medications will be adjusted or decreased as deemed appropriate at each visit.  No history of drug or alcohol abuse.  No concerns about diversion or abuse. Patient denies side effects related to the medication.  Patient is aware of random urine drug screens and pill counts. The dosing of this medication will be reviewed on a regular basis and reduced if possible..  Ongoing use of a controlled substance is necessary for this patient to have a normal quality of life.  5. Labs: n/a    Anxiety symptoms are under good control with Zoloft and Wellbutrin XL.  ADHD symptoms remain present, minimal effectiveness with  10 mg dose of Adderall XR, tolerating well without side effects, will increase dose today and send to [facility] pharmacy, patient instructed to contact either [place] office and leave vm with MA and if not heard back within 1-2 hrs to call the [place] office, or patient may call the [facility] office initially as MA is not physically in the [place] office today though checks vm.  Patient verbalized understanding. Patient to contact provider if symptoms worsen or fail to improve.     11/3/22:   Patient called to inform [facility], primary pharmacy, does not have Adderall XR 10 mg in stock, patient was instructed to call other local pharmacies to check availability.   Patient called back to tell us that he [facility]  Please send there Pharmacy changed and med pended in chart    11/3/22:  Continue Zoloft 50 mg by mouth daily to target anxiety.      Continue Wellbutrin  mg po daily in the morning to target attention and concentration deficit.      Start Adderall XR 10 mg by mouth daily in the morning to target symptoms associated with ADHD. Risks, benefits, side effects discussed with patient including elevated heart rate, elevated blood pressure, irritability, insomnia, sexual dysfunction, appetite suppressing properties, psychosis.  After discussion of these risks and benefits, the patient voiced understanding and agreed to proceed. Channing reviewed, UDS ordered, and controlled substance agreement signed & witnessed. Informed patient prescription would not be ordered until UDS results noted as negative, expected results within 30 minutes of providing sample.   POC UDS today in clinic  ADHD testing results reviewed from Dr. Eladio Dunn,[facility] dated 9/29/2022 indicating a diagnosis of RAFAEL and ADHD.     Patient to request refills for Wellbutrin XL and Zoloft in approximately 2 weeks, as patient has been educated he may need to decrease Wellbutrin XL dose due to  addition of Adderall XR.  Patient to contact provider if symptoms worsen or fail to improve.     10/3/22:   -Continue Zoloft 50 mg by mouth daily to target anxiety.    -Continue Wellbutrin  mg po daily in the morning to target attention and concentration deficit.     Patient denies need for refills at this time, tolerating current medications well without reported side effects.  Patient reported completing ADHD testing last week with Dr. Eladio Dunn, report not expected for at least 2 weeks. Will plan on next appointment in 4 weeks in person due to possible stimulant medication needed, as patient will be starting an new job Nov 6 and would prefer to come in the week prior.  If ADHD report findings indicate no formal diagnosis of ADHD will contact patient prior to next appointment.  Patient to contact provider if symptoms worsen or fail to improve.       7/26/22:   Declined therapy  Continue Zoloft 50 mg by mouth daily to target anxiety.  Risks, benefits, alternatives discussed with patient including GI upset, nausea vomiting diarrhea, theoretical decrease of seizure threshold predisposing the patient to a slightly higher seizure risk, headaches, sexual dysfunction, serotonin syndrome, bleeding risk, increased suicidality in patients 24 years and younger.  After discussion of these risks and benefits, the patient voiced understanding and agreed to proceed.  Increase Wellbutrin XL from 150 mg to 300 mg (instructed to start taking 2 tabs of the 150 mg on hand until new prescription picked up) po daily in the morning to target attention and concentration deficit. Discussed all risks, benefits, alternatives, and side effects of Bupropion/Wellbutrin including but not limited to GI upset (N/V/D, constipation), tachycardia, diaphoresis, weight loss, agitation, dizziness, headache, insomnia, tremor, blurred vision, anorexia, HTN, activation of char or hypomania, CNS stimulation and neuropsychiatric effects, ocular  effects, seizure risk, withdrawal syndrome following abrupt discontinuation, and activation of suicidal ideation and behavior. Patient  educated on the need to practice safe sex while taking this med. Discussed the need for patient to immediately call the office for any new or worsening symptoms, such as worsening depression; feeling nervous or restless; suicidal thoughts or actions; or other changes in mood or behavior, and all other concerns. Patient educated on medication compliance. Patient  verbalized understanding and is agreeable to taking Bupropion/Wellbutrin. Addressed all questions and concerns.     Referral for ADHD testing.  Patient to contact provider and set up appointment.  And to contact office if unable to get an appointment scheduled. -Attached list of several other sites for ADHD testing. Patient instructed to contact providers on list to determine availability of appointments, instructed patient to take notes while calling places indicating first available appointment, and to ask to be placed on waiting list.  If an office is chosen and requests the referral order please contact my Medical Assistant, Serene, directly at [phone] informing of chosen office and the information will be sent.    Dr. Eladio Dunn, Psychologist, MS, LPP  [address], [address]  [place] [id number] [phone]   Currently only accepting referrals for psychological testing services.  Accepts Most insurance plans except Medicare Plans    Patient presentation seems most consistent with anxiety vs ADHD, due to high suspicion of ADHD will refer for formal testing.  Increased Wellbutrin XL today , will see patient back in 4 weeks.       Patient screened positive for depression based on a PHQ-9 score of 0 on 7/11/2022. Follow-up recommendations include: Prescribed antidepressant medication treatment and Suicide Risk Assessment performed.     TREATMENT PLAN/GOALS: Continue supportive psychotherapy efforts  and medications as indicated. Treatment and medication options discussed during today's visit. Patient ackowledged and verbally consented to continue with current treatment plan and was educated on the importance of compliance with treatment and follow-up appointments.    MEDICATION ISSUES:  ANUSHA reviewed as expected.  Discussed medication options and treatment plan of prescribed medication as well as the risks, benefits, and side effects including potential falls, possible impaired driving and metabolic adversities among others. Patient is agreeable to call the office with any worsening of symptoms or onset of side effects. Patient is agreeable to call 911 or go to the nearest ER should he/she begin having SI/HI. No medication side effects or related complaints today.     MEDS ORDERED DURING VISIT:  No orders of the defined types were placed in this encounter.      Return in about 4 weeks (around 12/28/2022) for Video visit, medication check.         I spent 26 minutes caring for Thomas on this date of service. This time includes time spent by me in the following activities: preparing for the visit, performing a medically appropriate examination and/or evaluation, counseling and educating the patient/family/caregiver, referring and communicating with other health care professionals, documenting information in the medical record and care coordination.      This document has been electronically signed by JUAN Hayes  November 30, 2022 10:18 EST      Part of this note may be an electronic transcription/translation of spoken language to printed text using the Dragon Dictation System.

## 2022-11-30 NOTE — PATIENT INSTRUCTIONS
"1.  Please return to clinic at your next scheduled visit.  Contact the [facility] ([phone]) or **Serene, Medical Assistant at [place] Office directly at [phone] at least 24 hours prior in the event you need to cancel.**    2. Should you want to get in touch with your provider, JUAN Hayes, please contact MY Medical Assistant, Serene, directly at [phone].  Recommend saving Serene's direct number in phone as this is the PREFERRED & EASIEST way to get in contact with your provider.  Please leave a voice mail if you do not get an answer and she will return your call within 24 hrs. You will NOT be able to contact provider on [facility], as Behavioral Health Providers are restricted. YOU MUST CALL [phone]    If you need to speak with the on call provider after hours or on weekends, please Contact the [facility] ([phone]) and staff will be able to page the provider on call directly.        3, MEDICATION REFILLS:  PLEASE CALL THE PHARMACY TO REQUEST ALL MEDICATION REFILLS TO ENSURE YOU ARE RECEIVING YOUR MEDICATIONS IN A TIMELY MANNER.    IF YOU USE AN AUTOMATED SERVICE AT THE PHARMACY FOR REFILLS AND ARE TOLD THERE ARE \"NO REFILLS REMAINING\"   PLEASE CALL THE PHARMACY & SPEAK TO A LIVE PERSON TO VERIFY IT IS THE MOST UP TO DATE PRESCRIPTION ON FILE.    All new prescriptions will have a different number, therefore, if you were given refills for a medication today or at last visit it will not have the same number as the previous prescription.       4.  In the event you have personal crisis, contact the following crisis numbers: Suicide Prevention Hotline 1-538.657.4995 or *988, JOAQUIM Helpline 1-985-191-JOAQUIM; [facility] [phone]; text HELLO to 735417; or 028.      5. We would appreciate your feedback, please scan the QRS code on the back of your appointment card (or see below) and complete a brief survey.  [place] location is still not available, so " "please click \"[facility]\" location.  Thank you      SPECIFIC RECOMMENDATIONS:     1.      Medications discussed at this encounter:                   - Increase Adderall XR from 10 to 15 mg by mouth daily in the morning, if [facility] does not have supply of 15 mg please call the [place] office and leave vm for Serene and if you have not heard back in 1-2 hrs call the [place] office at [phone] and let staff know where to send.     Request refill of Wellbutrin  mg in 2-3 weeks via pharmacy, otherwise call office if you are not tolerating with the Adderall dose increase as I can decrease to 150 mg.      2.      Psychotherapy recommendations: Declined     3.     Return to clinic: 4 weeks    Please arrive at least 15 minutes before your scheduled appointment time to complete check in process.      IF you are scheduled for a [facility] VIDEO visit, PLEASE ANSWER YOUR PHONE WHEN OFFICE CALLS PRIOR TO VISIT TO COMPLETE THE CHECK IN PROCESS, EVEN IF THE E-CHECK IN WAS COMPLETED.     If you would like to log on to [facility] and complete the \"E-Check IN\" prior to your visit, please do so, this will speed up the check in process.  If you are due for questionnaires, you will find those on [facility] as well, please try to complete prior to your scheduled appointment.          "

## 2022-12-16 DIAGNOSIS — F41.1 GENERALIZED ANXIETY DISORDER: ICD-10-CM

## 2022-12-16 DIAGNOSIS — R41.840 ATTENTION AND CONCENTRATION DEFICIT: ICD-10-CM

## 2022-12-16 RX ORDER — BUPROPION HYDROCHLORIDE 300 MG/1
300 TABLET ORAL EVERY MORNING
Qty: 90 TABLET | Refills: 1 | Status: SHIPPED | OUTPATIENT
Start: 2022-12-16 | End: 2023-01-26

## 2022-12-16 NOTE — TELEPHONE ENCOUNTER
MEDICATION REFILL REQUEST FOR WELLBUTRIN XL 300MG.     PT HAS UPCOMING APPT ON 12/28/2022.     MEDICATION ORDER PENDED.

## 2022-12-28 ENCOUNTER — TELEMEDICINE (OUTPATIENT)
Dept: PSYCHIATRY | Facility: CLINIC | Age: 32
End: 2022-12-28

## 2022-12-28 DIAGNOSIS — F90.0 ADHD (ATTENTION DEFICIT HYPERACTIVITY DISORDER), INATTENTIVE TYPE: ICD-10-CM

## 2022-12-28 DIAGNOSIS — F90.0 ADHD (ATTENTION DEFICIT HYPERACTIVITY DISORDER), INATTENTIVE TYPE: Primary | ICD-10-CM

## 2022-12-28 PROCEDURE — 99213 OFFICE O/P EST LOW 20 MIN: CPT | Performed by: NURSE PRACTITIONER

## 2022-12-28 RX ORDER — DEXTROAMPHETAMINE SACCHARATE, AMPHETAMINE ASPARTATE MONOHYDRATE, DEXTROAMPHETAMINE SULFATE AND AMPHETAMINE SULFATE 5; 5; 5; 5 MG/1; MG/1; MG/1; MG/1
20 CAPSULE, EXTENDED RELEASE ORAL EVERY MORNING
Qty: 30 CAPSULE | Refills: 0 | Status: SHIPPED | OUTPATIENT
Start: 2022-12-28 | End: 2023-01-26 | Stop reason: SDUPTHER

## 2022-12-28 NOTE — PROGRESS NOTES
This provider is [place] [address] in closed office to ensure privacy. The Patient is seen remotely using [employer]. Patient is being seen via telehealth and confirm that they are in a secure environment for this session. The patient's condition being diagnosed/treated is appropriate for telemedicine. The provider identified himself/herself: herself as well as her credentials.   The patient gave consent to be seen remotely, and when consent is given they understand that the consent allows for patient identifiable information to be sent to a third party as needed.   They may refuse to be seen remotely at any time. The electronic data is encrypted and password protected, and the patient has been advised of the potential risks to privacy not withstanding such measures.    You have chosen to receive care through a telehealth visit.  Do you consent to use a video/audio connection for your medical care today? Yes     Subjective   Thomas Lei is a 32 y.o. male who presents today for follow up    Referring Provider:  Otilia Haskins, APRN  [id number] [address]    Chief Complaint:  ADHD medication check    History of Present Illness:   12/28/22:  Patient presents today via MyChart Video visit from home as patient has 3.5 weeks remaining of maternity leave, increased Adderall XR from 10 mg to 15 mg, which patient reports not noticing a lot of difference in between the different doses.  Patient reports there have been a few days of improvement with focus and ability to complete tasks though not consistent.  Admits to adherence to daily dose.  Denies side effects, though slight decrease in appetite, continues to eat 3 normal size meals with snacks.      Patient reports since increase of Adderall, noting not wearing off until later in the evening, as with the 10 mg noted wearing off earlier around 3 pm.    Overall, patient feels medication does help though  "not as expected.  Patient has been able to stay on task putting daughter toys together, though feels personal expectations may have been different, as the consistency from day to day is the primary problem patient is experiencing.  \"it feels waveish\", agreeable to start tracking symptoms so at next appointment can provide better details.     Patient continues to experience symptoms of inattentiveness, fidgeting, easy distractability, inability to complete tasks, difficulty sustaining attention, shifting from one uncompleted activity to another, talking excessively, ineffective listening, frequently losing items, and impulsivity.          11/30/22:  Patient presents today via MyChart Video visit from hospital, as patient wife had baby girl on Monday 11/28/22.  Adderall XR 10 mg was started at last visit, for which patient did have difficulty obtaining initially due to pharmacy shortage.  Patient was able to start medication on 11/3/22.  Patient voices some improvement with Adderall XR, additional focus ability, however, around 3-4 pm has been experiencing some grogginess. Takes daily at 8 am.  Minimal decrease in appetite, denies difficulty with sleep or elevated heart rate.     Patient continues to experience symptoms of inattentiveness, fidgeting, easy distractability, inability to complete tasks, difficulty sustaining attention, shifting from one uncompleted activity to another, talking excessively, ineffective listening, frequently losing items, and impulsivity.     Patient has started new job which is going well, patient does get 12 weeks of paid leave and will be home with wife and other daughter.    Patient reports tolerating Adderall XR with Wellbutrin  mg, denies side effects.         11/3/22:  Patient presents today in office to review ADHD formal testing results and discuss treatment options.  A diagnosed of RAFAEL and ADHD has been confirmed via formal testing on 09/29/2022.     Patient has been doing " more meditation and other coping mechanisms -yoga, deep breathing exercises to help manage anxiety, which have been effective.     Patient continues to experience inattentiveness, difficulty with focus, fidgeting, distractability, inability to complete tasks, difficulty sustaining attention, shifting from one uncompleted activity to another, talking excessively, ineffective listening, frequently losing items, and impulsivity.      10/3/22:  Patient presents today via [employer] Video visit from home, reporting had ADHD testing last week with Dr.Brian Dunn.    Increased Wellbutrin XL to 300 mg at last visit which patient reports at times has been effective.  Recently switched to night shift, and has 1 yr old, however, while on days was able to notice improvement of symptoms after 4-5 hrs.  Patient will be moved to day shift later this month, however, will be starting with a new employer in Nov. 7, 2022.  Patient reports new job will provide more stability, M-F 9 a-5 pm, and flexibility. Will be on same schedule as wife for the first time.    Patient reports taking the Wellbutrin before start of work.  Denies side effects.       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  Patient has never been evaluated for ADHD. Was started on Zoloft 5/2021 and Wellbutrin  mg was added 2/2022 which was initially effective for anxiety though not as much on attention and focus,  as patient reports anxiety had improved, and no longer having panic attacks feelings of heart racing nor palpitations.     Patient recalls having some anxiety in high school, had a 4.3 GPA and upon starting college grades suffered, and GPA was 2.89.  Patient is currently working as a Earthineer. at [employer], and is trying for a promotion which has been a struggle  "due to difficulty focusing on studying for the position. Next month will be the 5 th attempt.         1. ADHD:   a. Elementary school:   i. Grades:B's  ii. Special classes or failures:No  iii. Got in trouble:\"few times for not sitting still or seated, but nothing outside of that\"  iv. Referral for ADHD testing:No  b. Fhx:Brother (Armando)- 2 yrs younger, started on medications at age 8, older Half Brother (Carlton) (8-10 yrs older) possibly diagnosed at preteen age-only lived with patient for 4 yrs; Younger half Brother (Renato)-ASD,ADD  c. Presently:  i. Problems with attention to detail:Yes, at work misses data, errors, have to resend emails  ii. Problems with sustained attention:Yes, \"it's either super hyper focused or exact opposite going from task to task, could be the type of work for the day.\"  iii. Problems listening when spoken to directly:No  iv. Failure to finish tasks: Yes,\"extreme procrastination with reports, my biggest thing right now is I am struggling to study for interviews at [employer] for a propmotion 5 th time in August, struggling to get through the interview, studying piece, will have classical music in background or gets chimes from email and focuses on that instead of studying.\"  v. Avoids tasks that require sustained mental effort:Yes, avoidance of studying for interview, no other times.   vi. Easily distracted:Yes, music, noises of email chimes, phone calls, text messages, \"I am always plugged in because of the job I am on call 24/7, I feel less distracted since I have been working nights, I am less productive vs day shift because I am more involved in operations.\"  vii. Forgetting things:Yes, \"walk into kitchen, open fridge, ask myself what am I doing here, I have a cooler sitting in garage that needs to be thrown in car for one month now. Few times I actually left laptop at home, work is 1 hr in [place], [place].\"  viii. Losing things:Yes, my wallet is a big one, my wife finds it for me, I " "have established specific areas otherwise I will be searching for days, I once lost my car keys for 3 days.\"  ix. Hard to organize:Yes, \"I am getting better, I use one note for tasks, make them by priority which has helped. I am messier, dirty clothes will sit on floor instead of going into hamper.\"  x. Talks a lot and cutting people off:No \"I have tried to become more of a listener rather than listening to respond.\" \"I used to cut people off all the time, so I am getting better at that, it is an effort.\" Patient now takes pre-notes prior to meeting, which data is needed in order to answer questions, \"if I don't have it answered, I can get it to you by the end of the day.\"  xi. Drifts off during conversations:Yes if conversation is not entertaining or when wife talks about grocery, \"I stay very engaged in work meetings because of the level of my position, I make a decision to close my laptop, to limit distraction.\"  xii. Difficulty with Reading:No  xiii. Difficulty watching TV/Movies:Yes, \"If I don't find something exciting, I will go to next movie, series or video.  Even when I am with my parents, I will pull out phone to get more stimulation.\"     In home setting patient admits to wife having to ask several times if listening, due to not interested in topic, \"a lot of the time I hear her kind of, but I think I make a decision to not respond, it's odd.\" Patient has not worked the last 2 days and has made excuses to not change the cat box.  Has some times of using full day of yard work or house hold chores, \"it's either I am extremely productive or I avoid at all cost. It's a lot of excuses.\"  Patient feels avoidance with wife has been ongoing, no changes. Work has made anxiety worse and was started on medications in the last 1.5 yrs.  Patient has had pressure with interviews and promotions.  Patient reports avoiding laundry and would wear all clothing until none remaining, now wife does all the laundry.  Recalls " "only completing house hold tasks, such as the dishes 90% and wife has to finish the remaining 10%, \"I am the same with food, there's always 10% left on the plate.\"      Symptoms include fidgeting (knee bouncing or spins phone on table or pen in hands), easy distractability, inability to complete tasks, difficulty sustaining attention, shifting from one uncompleted activity to another, talking excessively, ineffective listening, frequently losing items, and impulsivity.  Patient reports frequently allow services to home: bug company spraying every 3 months, bought a 65,000 truck while in [employer], purchased a 1,200 suit one day which was not thought out, and rarely wears.       PHQ-9 Depression Screening  PHQ-9 Total Score:   7/11/2022 0 , reassess 03/2023    Little interest or pleasure in doing things?     Feeling down, depressed, or hopeless?     Trouble falling or staying asleep, or sleeping too much?     Feeling tired or having little energy?     Poor appetite or overeating?     Feeling bad about yourself - or that you are a failure or have let yourself or your family down?     Trouble concentrating on things, such as reading the newspaper or watching television?     Moving or speaking so slowly that other people could have noticed? Or the opposite - being so fidgety or restless that you have been moving around a lot more than usual?     Thoughts that you would be better off dead, or of hurting yourself in some way?     PHQ-9 Total Score       RAFAEL-7    11/3/22 3, reassess 03/2023    Past Surgical History:  Past Surgical History:   Procedure Laterality Date   • ENDOSCOPY N/A 4/14/2022    Procedure: ESOPHAGOGASTRODUODENOSCOPY WITH BX;  Surgeon: Joan Luna MD;  Location: [facility] ENDOSCOPY;  Service: Gastroenterology;  Laterality: N/A;  ESOPHAGITIS, GASTRITIS, ESOPHAGEAL STRICTURE   • ENDOSCOPY N/A 5/23/2022    Procedure: ESOPHAGOGASTRODUODENOSCOPY WITH 10-15MM BALLOON DILATATION AND BIOPSIES;  Surgeon: " Joan Luna MD;  Location: [facility] ENDOSCOPY;  Service: Gastroenterology;  Laterality: N/A;  GASTRITIS, ESOPHAGITIS, ESOPHAGEAL STRICTURE   • HERNIA REPAIR      Hernia Repair: at age 17; right inguinal; uncomplicated; dual;   • SHOULDER SURGERY      left shoulder labrum repair at age 22 - sports related injury       Problem List:  Patient Active Problem List   Diagnosis   • Esophageal dysphagia   • Anxiety   • Attention and concentration deficit       Allergy:   No Known Allergies     Discontinued Medications:  Medications Discontinued During This Encounter   Medication Reason   • budesonide (Pulmicort) 0.5 MG/2ML nebulizer solution *Therapy completed   • omeprazole (priLOSEC) 40 MG capsule *Therapy completed       Current Medications:   Current Outpatient Medications   Medication Sig Dispense Refill   • amphetamine-dextroamphetamine XR (Adderall XR) 15 MG 24 hr capsule Take 1 capsule by mouth Every Morning for 30 days Indications: Attention Deficit Hyperactivity Disorder 30 capsule 0   • buPROPion XL (Wellbutrin XL) 300 MG 24 hr tablet Take 1 tablet by mouth Every Morning 90 tablet 1   • sertraline (ZOLOFT) 50 MG tablet Take 1 tablet by mouth Every Morning. 30 tablet 2     No current facility-administered medications for this visit.       Past Medical History:  Past Medical History:   Diagnosis Date   • Anxiety disorder, unspecified    • Concussion     X3   • Dysphagia, unspecified    • Fever, unspecified    • Head injury    • Influenza due to identified novel influenza A virus with other respiratory manifestations    • Panic disorder    • PONV (postoperative nausea and vomiting)    • Procreative counseling and advice using natural family planning        Past Psychiatric History:  Began Treatment:2021  Diagnoses:Anxiety  Psychiatrist:Denies  Therapist:Denies  Admission History:Denies  Medication Trials:none  Self Harm: Denies  Suicide Attempts:Denies      Substance Abuse History:   Types:Denies all,  including illicit  Withdrawal Symptoms:Denies  Longest Period Sober:Not Applicable   AA: Not applicable     Social History:  Martial Status:  Employed:Yes and If so, where [employer] and . 9a-5p  Kids:Yes or If so, how many 2 girls    House:Lives in a house   History: [id number]  Access to Guns: Yes, put in storage not in home    Social History     Socioeconomic History   • Marital status:    • Number of children: 1   • Years of education: 16   • Highest education level: Bachelor's degree (e.g., BA, AB, BS)   Tobacco Use   • Smoking status: Never   • Smokeless tobacco: Former     Types: Chew     Quit date: 5/21/2022   Vaping Use   • Vaping Use: Never used   Substance and Sexual Activity   • Alcohol use: Yes     Comment: 1-2 drinks per month    • Drug use: Not Currently     Types: Marijuana     Comment: back in high school   • Sexual activity: Yes       Family History:   Suicide Attempts: Brother  Suicide Completions:Denies      Family History   Problem Relation Age of Onset   • Sjogren's syndrome Mother    • Diverticulitis Mother    • Cardiomyopathy Father    • Suicide Attempts Brother    • Seizures Brother    • ADD / ADHD Brother    • ADD / ADHD Brother    • Cardiomyopathy Paternal Uncle    • Dementia Maternal Grandmother        Developmental History:   Born: [place]  Siblings:5   Childhood: Denies Abuse  High School:Completed  College:Bachelors in K[employer]ology kinesiology     Mental Status Exam:   Hygiene:   good  Cooperation:  Cooperative  Eye Contact:  Good  Psychomotor Behavior:  Appropriate  Affect:  Appropriate  Mood: euthymic  Speech:  Normal  Thought Process:  Goal directed  Thought Content:  Mood congruent  Suicidal:  None  Homicidal:  None  Hallucinations:  None  Delusion:  None  Memory:  Intact  Orientation:  Person, Place, Time and Situation  Reliability:  good  Insight:  Good  Judgement:  Good  Impulse Control:  Good  Physical/Medical  Issues:  Yes Esophageal Dysphagia     Review of Systems:  Review of Systems   Constitutional: Negative for diaphoresis and fatigue.   HENT: Negative for drooling.    Eyes: Negative for visual disturbance.   Respiratory: Negative for cough and shortness of breath.    Cardiovascular: Negative for chest pain, palpitations and leg swelling.   Gastrointestinal: Negative for nausea and vomiting.   Endocrine: Negative for cold intolerance and heat intolerance.   Genitourinary: Negative for difficulty urinating.   Musculoskeletal: Negative for joint swelling.   Allergic/Immunologic: Negative for immunocompromised state.   Neurological: Negative for dizziness, tremors, seizures, speech difficulty and numbness.   Psychiatric/Behavioral: Positive for decreased concentration. Negative for hallucinations, self-injury, sleep disturbance and suicidal ideas. The patient is not nervous/anxious and is not hyperactive.          Physical Exam:  Physical Exam  Psychiatric:         Attention and Perception: Attention and perception normal.         Mood and Affect: Mood and affect normal.         Speech: Speech normal.         Behavior: Behavior normal. Behavior is cooperative.         Thought Content: Thought content normal. Thought content does not include suicidal ideation. Thought content does not include suicidal plan.         Cognition and Memory: Cognition and memory normal.         Judgment: Judgment normal.         Vital Signs:   There were no vitals taken for this visit.     Lab Results:   Clinical Support on 11/03/2022   Component Date Value Ref Range Status   • Amphetamine Screen, Urine 11/03/2022 Negative  Negative Final   • Barbiturates Screen, Urine 11/03/2022 Negative  Negative Final   • Buprenorphine, Screen, Urine 11/03/2022 Negative  Negative Final   • Benzodiazepine Screen, Urine 11/03/2022 Negative  Negative Final   • Cocaine Screen, Urine 11/03/2022 Negative  Negative Final   • MDMA (ECSTASY) 11/03/2022 Negative   Negative Final   • Methamphetamine, Ur 11/03/2022 Negative  Negative Final   • Methadone Screen, Urine 11/03/2022 Negative  Negative Final   • Opiate Screen 11/03/2022 Negative  Negative Final   • Oxycodone Screen, Urine 11/03/2022 Negative  Negative Final   • Phencyclidine (PCP), Urine 11/03/2022 Negative  Negative Final   • THC, Screen, Urine 11/03/2022 Negative  Negative Final   • Lot Number 11/03/2022 [id number]   Final   • Expiration Date 11/03/2022 02/29/2024   Final       EKG Results:  No orders to display       Imaging Results:  No Images in the past 120 days found..      Assessment & Plan       Visit Diagnoses:    ICD-10-CM ICD-9-CM   1. ADHD (attention deficit hyperactivity disorder), inattentive type  F90.0 314.00       PLAN:  1.   Safety: No acute safety concerns  2. Therapy: None  3. Risk Assessment: Risk of self-harm acutely is moderate.  Risk factors include anxiety disorder,family history, access to guns/weapons, and recent psychosocial stressors (pandemic). Protective factors include no present SI, no history of suicide attempts or self-harm in the past, minimal AODA, healthcare seeking, future orientation, willingness to engage in care.  Risk of self-harm chronically is also moderate, but could be further elevated in the event of treatment noncompliance and/or AODA.  4. Meds:  Continue Zoloft 50 mg by mouth daily to target anxiety.      Continue Wellbutrin  mg po daily in the morning to target attention and concentration deficit.      Increase Adderall XR from 15 mg TO 20 mg by mouth daily in the morning to target symptoms associated with ADHD. Risks, benefits, side effects discussed with patient including elevated heart rate, elevated blood pressure, irritability, insomnia, sexual dysfunction, appetite suppressing properties, psychosis.  After discussion of these risks and benefits, the patient voiced understanding and agreed to proceed. Channing reviewed, last dispensed 15 mg 11/30/22 #30/30  days.  Informed patient order would be sent to JUAN Lang in practice, as  is out of office today, in separate entry for signature due to EMR system, and will not see as refilled on AVS today.  Controlled substance documentation: Channing reviewed; prior urine drug screen consistent obtained 11/3/22; consent is up to date, signed, witnessed and in EHR, dated 11/3/22, which will be updated annually per policy. Patient is aware of risk of addiction on this medication, understands need to follow up for a review every 3 months and medications will be adjusted or decreased as deemed appropriate at each visit.  No history of drug or alcohol abuse.  No concerns about diversion or abuse. Patient denies side effects related to the medication.  Patient is aware of random urine drug screens and pill counts. The dosing of this medication will be reviewed on a regular basis and reduced if possible..  Ongoing use of a controlled substance is necessary for this patient to have a normal quality of life.  5. Labs: n/a    Adderall XR 15 mg providing inconsistent relief of ADHD symptoms, denies side effects, will increase dose today and have patient return in 3 weeks, as patient will be returning to work before 4 weeks from today.  Patient to track daily symptoms, activities, ability to complete tasks and report at next appointment.  Patient instructed to notify provider if experiencing insomnia as combination with Wellbutrin XL may effect sleep and dose may need to be lowered.  Patient to contact provider if symptoms worsen or fail to improve.       11/30/22:  Continue Zoloft 50 mg by mouth daily to target anxiety.      Continue Wellbutrin  mg po daily in the morning to target attention and concentration deficit.  Instructed patient to request refill in 2-3 weeks from pharmacy if continues to tolerate with Adderall XR, otherwise to call office and request dose decrease to 150 mg.   Increase Adderall XR from 10 mg TO 15  mg by mouth daily in the morning to target symptoms associated with ADHD. Risks, benefits, side effects discussed with patient including elevated heart rate, elevated blood pressure, irritability, insomnia, sexual dysfunction, appetite suppressing properties, psychosis.  After discussion of these risks and benefits, the patient voiced understanding and agreed to proceed. Channing reviewed, last dispensed 10 mg dose 11/3/22 #30/30 days.  Informed patient order would be sent to Dr. Singh in separate entry for signature due to EMR system, and will not see as refilled on AVS today.  Controlled substance documentation: Channing reviewed; prior urine drug screen consistent obtained 11/3/22; consent is up to date, signed, witnessed and in EHR, dated 11/3/22, which will be updated annually per policy. Patient is aware of risk of addiction on this medication, understands need to follow up for a review every 3 months and medications will be adjusted or decreased as deemed appropriate at each visit.  No history of drug or alcohol abuse.  No concerns about diversion or abuse. Patient denies side effects related to the medication.  Patient is aware of random urine drug screens and pill counts. The dosing of this medication will be reviewed on a regular basis and reduced if possible..  Ongoing use of a controlled substance is necessary for this patient to have a normal quality of life.    Anxiety symptoms are under good control with Zoloft and Wellbutrin XL.  ADHD symptoms remain present, minimal effectiveness with 10 mg dose of Adderall XR, tolerating well without side effects, will increase dose today and send to [facility] pharmacy, patient instructed to contact either [place] office and leave vm with MA and if not heard back within 1-2 hrs to call the [place] office, or patient may call the [facility] office initially as MA is not physically in the [place] office today though checks vm.  Patient verbalized understanding. Patient  to contact provider if symptoms worsen or fail to improve.     11/3/22:   Patient called to inform [facility], primary pharmacy, does not have Adderall XR 10 mg in stock, patient was instructed to call other local pharmacies to check availability.   Patient called back to tell us that he [facility]  Please send there Pharmacy changed and med pended in chart    11/3/22:  Continue Zoloft 50 mg by mouth daily to target anxiety.      Continue Wellbutrin  mg po daily in the morning to target attention and concentration deficit.      Start Adderall XR 10 mg by mouth daily in the morning to target symptoms associated with ADHD. Risks, benefits, side effects discussed with patient including elevated heart rate, elevated blood pressure, irritability, insomnia, sexual dysfunction, appetite suppressing properties, psychosis.  After discussion of these risks and benefits, the patient voiced understanding and agreed to proceed. [place] reviewed, UDS ordered, and controlled substance agreement signed & witnessed. Informed patient prescription would not be ordered until UDS results noted as negative, expected results within 30 minutes of providing sample.   POC UDS today in clinic  ADHD testing results reviewed from Dr. Eladio Dunn,[facility] dated 9/29/2022 indicating a diagnosis of RAFAEL and ADHD.     Patient to request refills for Wellbutrin XL and Zoloft in approximately 2 weeks, as patient has been educated he may need to decrease Wellbutrin XL dose due to addition of Adderall XR.  Patient to contact provider if symptoms worsen or fail to improve.     10/3/22:   -Continue Zoloft 50 mg by mouth daily to target anxiety.    -Continue Wellbutrin  mg po daily in the morning to target attention and concentration deficit.     Patient denies need for refills at this time, tolerating current medications well without reported side effects.  Patient reported completing ADHD  testing last week with Dr. Eladio Dunn, report not expected for at least 2 weeks. Will plan on next appointment in 4 weeks in person due to possible stimulant medication needed, as patient will be starting an new job Nov 6 and would prefer to come in the week prior.  If ADHD report findings indicate no formal diagnosis of ADHD will contact patient prior to next appointment.  Patient to contact provider if symptoms worsen or fail to improve.       7/26/22:   Declined therapy  Continue Zoloft 50 mg by mouth daily to target anxiety.  Risks, benefits, alternatives discussed with patient including GI upset, nausea vomiting diarrhea, theoretical decrease of seizure threshold predisposing the patient to a slightly higher seizure risk, headaches, sexual dysfunction, serotonin syndrome, bleeding risk, increased suicidality in patients 24 years and younger.  After discussion of these risks and benefits, the patient voiced understanding and agreed to proceed.  Increase Wellbutrin XL from 150 mg to 300 mg (instructed to start taking 2 tabs of the 150 mg on hand until new prescription picked up) po daily in the morning to target attention and concentration deficit. Discussed all risks, benefits, alternatives, and side effects of Bupropion/Wellbutrin including but not limited to GI upset (N/V/D, constipation), tachycardia, diaphoresis, weight loss, agitation, dizziness, headache, insomnia, tremor, blurred vision, anorexia, HTN, activation of char or hypomania, CNS stimulation and neuropsychiatric effects, ocular effects, seizure risk, withdrawal syndrome following abrupt discontinuation, and activation of suicidal ideation and behavior. Patient  educated on the need to practice safe sex while taking this med. Discussed the need for patient to immediately call the office for any new or worsening symptoms, such as worsening depression; feeling nervous or restless; suicidal thoughts or actions; or other changes in mood or  behavior, and all other concerns. Patient educated on medication compliance. Patient  verbalized understanding and is agreeable to taking Bupropion/Wellbutrin. Addressed all questions and concerns.     Referral for ADHD testing.  Patient to contact provider and set up appointment.  And to contact office if unable to get an appointment scheduled. -Attached list of several other sites for ADHD testing. Patient instructed to contact providers on list to determine availability of appointments, instructed patient to take notes while calling places indicating first available appointment, and to ask to be placed on waiting list.  If an office is chosen and requests the referral order please contact my Medical Assistant, Serene, directly at [phone] informing of chosen office and the information will be sent.    Dr. Eladio Dunn, Psychologist, MS, LPP  [address], [address]  [place] [id number] [phone]   Currently only accepting referrals for psychological testing services.  Accepts Most insurance plans except Medicare Plans    Patient presentation seems most consistent with anxiety vs ADHD, due to high suspicion of ADHD will refer for formal testing.  Increased Wellbutrin XL today , will see patient back in 4 weeks.       Patient screened positive for depression based on a PHQ-9 score of 0 on 7/11/2022. Follow-up recommendations include: Prescribed antidepressant medication treatment and Suicide Risk Assessment performed.     TREATMENT PLAN/GOALS: Continue supportive psychotherapy efforts and medications as indicated. Treatment and medication options discussed during today's visit. Patient ackowledged and verbally consented to continue with current treatment plan and was educated on the importance of compliance with treatment and follow-up appointments.    MEDICATION ISSUES:  ANUSHA reviewed as expected.  Discussed medication options and treatment plan of prescribed medication as well as the  risks, benefits, and side effects including potential falls, possible impaired driving and metabolic adversities among others. Patient is agreeable to call the office with any worsening of symptoms or onset of side effects. Patient is agreeable to call 911 or go to the nearest ER should he/she begin having SI/HI. No medication side effects or related complaints today.     MEDS ORDERED DURING VISIT:  No orders of the defined types were placed in this encounter.      Return in about 3 weeks (around 1/18/2023) for medication check, Video visit.         I spent 28 minutes caring for Thomas on this date of service. This time includes time spent by me in the following activities: preparing for the visit, performing a medically appropriate examination and/or evaluation, counseling and educating the patient/family/caregiver, referring and communicating with other health care professionals, documenting information in the medical record and care coordination.      This document has been electronically signed by JUAN Hayes  December 28, 2022 10:41 EST      Part of this note may be an electronic transcription/translation of spoken language to printed text using the Dragon Dictation System.

## 2022-12-28 NOTE — PATIENT INSTRUCTIONS
"1.  Please return to clinic at your next scheduled visit.  Contact the [facility] ([phone]) or **Serene, Medical Assistant at [place] Office directly at [phone] at least 24 hours prior in the event you need to cancel.**    2. Should you want to get in touch with your provider, JUAN Hayes, please contact MY Medical Assistant, Serene, directly at [phone].  Recommend saving Serene's direct number in phone as this is the PREFERRED & EASIEST way to get in contact with your provider.  Please leave a voice mail if you do not get an answer and she will return your call within 24 hrs. You will NOT be able to contact provider on [facility], as Behavioral Health Providers are restricted. YOU MUST CALL [phone]    If you need to speak with the on call provider after hours or on weekends, please Contact the [facility] ([phone]) and staff will be able to page the provider on call directly.        3, MEDICATION REFILLS:  PLEASE CALL THE PHARMACY TO REQUEST ALL MEDICATION REFILLS TO ENSURE YOU ARE RECEIVING YOUR MEDICATIONS IN A TIMELY MANNER.    IF YOU USE AN AUTOMATED SERVICE AT THE PHARMACY FOR REFILLS AND ARE TOLD THERE ARE \"NO REFILLS REMAINING\"   PLEASE CALL THE PHARMACY & SPEAK TO A LIVE PERSON TO VERIFY IT IS THE MOST UP TO DATE PRESCRIPTION ON FILE.    All new prescriptions will have a different number, therefore, if you were given refills for a medication today or at last visit it will not have the same number as the previous prescription.       4.  In the event you have personal crisis, contact the following crisis numbers: Suicide Prevention Hotline 1-715.147.8873 or *988, [facility] Helpline 2-684-458-JOAQUIM; [facility] [phone]; text HELLO to 536044; or 903.      5. We would appreciate your feedback, please scan the QRS code on the back of your appointment card (or see below) and complete a brief survey.  [place] location is still not available, so " "please click \"[place]\" location.  Thank you      SPECIFIC RECOMMENDATIONS:     1.      Medications discussed at this encounter:                   - Increase Adderall XR from 15 to 20 mg     Keep track of symptoms, daily ability to complete tasks, and effectiveness and bring to next appointment.     2.      Psychotherapy recommendations: Declined     3.     Return to clinic: 3 weeks    Please arrive at least 15 minutes before your scheduled appointment time to complete check in process.      IF you are scheduled for a [facility] VIDEO visit, PLEASE ANSWER YOUR PHONE WHEN OFFICE CALLS PRIOR TO VISIT TO COMPLETE THE CHECK IN PROCESS, EVEN IF THE E-CHECK IN WAS COMPLETED.     If you would like to log on to [facility] and complete the \"E-Check IN\" prior to your visit, please do so, this will speed up the check in process.  If you are due for questionnaires, you will find those on [facility] as well, please try to complete prior to your scheduled appointment.          "

## 2023-01-25 ENCOUNTER — TELEPHONE (OUTPATIENT)
Dept: PSYCHIATRY | Facility: CLINIC | Age: 33
End: 2023-01-25

## 2023-01-25 NOTE — TELEPHONE ENCOUNTER
After Rescheduling patient today he wanted me to let Celina know that the Adderall XR 20 mg seems to be working well, but he did DC the Wellbutrin due to excessive restlessness.  I rescheduled patient to February 07/2023.

## 2023-01-26 DIAGNOSIS — F90.0 ADHD (ATTENTION DEFICIT HYPERACTIVITY DISORDER), INATTENTIVE TYPE: ICD-10-CM

## 2023-01-26 RX ORDER — DEXTROAMPHETAMINE SACCHARATE, AMPHETAMINE ASPARTATE MONOHYDRATE, DEXTROAMPHETAMINE SULFATE AND AMPHETAMINE SULFATE 5; 5; 5; 5 MG/1; MG/1; MG/1; MG/1
20 CAPSULE, EXTENDED RELEASE ORAL EVERY MORNING
Qty: 30 CAPSULE | Refills: 0 | Status: SHIPPED | OUTPATIENT
Start: 2023-01-26 | End: 2023-01-26 | Stop reason: SDUPTHER

## 2023-01-26 RX ORDER — DEXTROAMPHETAMINE SACCHARATE, AMPHETAMINE ASPARTATE MONOHYDRATE, DEXTROAMPHETAMINE SULFATE AND AMPHETAMINE SULFATE 5; 5; 5; 5 MG/1; MG/1; MG/1; MG/1
20 CAPSULE, EXTENDED RELEASE ORAL EVERY MORNING
Qty: 30 CAPSULE | Refills: 0 | Status: SHIPPED | OUTPATIENT
Start: 2023-01-26 | End: 2023-02-25

## 2023-02-06 ENCOUNTER — PRIOR AUTHORIZATION (OUTPATIENT)
Dept: BEHAVIORAL HEALTH | Facility: CLINIC | Age: 33
End: 2023-02-06
Payer: COMMERCIAL

## 2023-02-06 ENCOUNTER — TELEPHONE (OUTPATIENT)
Dept: BEHAVIORAL HEALTH | Facility: CLINIC | Age: 33
End: 2023-02-06
Payer: COMMERCIAL

## 2023-02-06 NOTE — TELEPHONE ENCOUNTER
VICENTA GALE [place]: [id number] - PA Case ID: [id number] - Rx #: [id number]  PA for Adderall XR 20mg sent to plan 02/06/2023 awaiting decision from insurance company

## 2023-02-07 ENCOUNTER — TELEMEDICINE (OUTPATIENT)
Dept: BEHAVIORAL HEALTH | Facility: CLINIC | Age: 33
End: 2023-02-07
Payer: COMMERCIAL

## 2023-02-07 DIAGNOSIS — F90.0 ADHD (ATTENTION DEFICIT HYPERACTIVITY DISORDER), INATTENTIVE TYPE: Primary | ICD-10-CM

## 2023-02-07 PROCEDURE — 99214 OFFICE O/P EST MOD 30 MIN: CPT | Performed by: NURSE PRACTITIONER

## 2023-02-07 NOTE — PATIENT INSTRUCTIONS
"1.  Please return to clinic at your next scheduled visit.  Contact the [facility] ([phone]) or **Serene, Medical Assistant at [place] Office directly at [phone] at least 24 hours prior in the event you need to cancel.**    2. Should you want to get in touch with your provider, JUAN Hayes, please contact MY Medical Assistant, Serene, directly at [phone].  Recommend saving Serene's direct number in phone as this is the PREFERRED & EASIEST way to get in contact with your provider.  Please leave a voice mail if you do not get an answer and she will return your call within 24 hrs. You will NOT be able to contact provider on [facility], as Behavioral Health Providers are restricted. YOU MUST CALL [phone]    If you need to speak with the on call provider after hours or on weekends, please Contact the [facility] ([phone]) and staff will be able to page the provider on call directly.        3, MEDICATION REFILLS:  PLEASE CALL THE PHARMACY TO REQUEST ALL MEDICATION REFILLS TO ENSURE YOU ARE RECEIVING YOUR MEDICATIONS IN A TIMELY MANNER.    IF YOU USE AN AUTOMATED SERVICE AT THE PHARMACY FOR REFILLS AND ARE TOLD THERE ARE \"NO REFILLS REMAINING\"   PLEASE CALL THE PHARMACY & SPEAK TO A LIVE PERSON TO VERIFY IT IS THE MOST UP TO DATE PRESCRIPTION ON FILE.    All new prescriptions will have a different number, therefore, if you were given refills for a medication today or at last visit it will not have the same number as the previous prescription.       4.  In the event you have personal crisis, contact the following crisis numbers: Suicide Prevention Hotline 1-553.484.1680 or *988, [facility] Helpline 9-893-230-JOAQUIM; [facility] Emergency Room [phone]; text HELLO to 639943; or 849.      5. We would appreciate your feedback, please scan the QRS code on the back of your appointment card (or see below) and complete a brief survey.  [place] location is still not available, so " "please click \"[place]\" location.  Thank you      SPECIFIC RECOMMENDATIONS:     1.      Medications discussed at this encounter:                   - will plan on continuing Adderall XR 20 mg, however, this will be further determined by insurance company.     Contact insurance with information given today, and notify office directly to Serene's number of specific details so we can proceed.      2.      Psychotherapy recommendations: Declined     3.     Return to clinic: 5 weeks    Please arrive at least 15 minutes before your scheduled appointment time to complete check in process.      IF you are scheduled for a [facility] VIDEO visit, PLEASE ANSWER YOUR PHONE WHEN OFFICE CALLS PRIOR TO VISIT TO COMPLETE THE CHECK IN PROCESS, EVEN IF THE E-CHECK IN WAS COMPLETED.     If you would like to log on to [facility] and complete the \"E-Check IN\" prior to your visit, please do so, this will speed up the check in process.  If you are due for questionnaires, you will find those on [facility] as well, please try to complete prior to your scheduled appointment.          "

## 2023-02-07 NOTE — PROGRESS NOTES
"This provider is located at [address] [facility] [address], [place] [address]. The Patient is seen remotely using [email or web address]. Patient is being seen via telehealth and confirm that they are in a secure environment for this session. The patient's condition being diagnosed/treated is appropriate for telemedicine. The provider identified himself/herself: herself as well as her credentials.   The patient gave consent to be seen remotely, and when consent is given they understand that the consent allows for patient identifiable information to be sent to a third party as needed.   They may refuse to be seen remotely at any time. The electronic data is encrypted and password protected, and the patient has been advised of the potential risks to privacy not withstanding such measures.    You have chosen to receive care through a telehealth visit.  Do you consent to use a video/audio connection for your medical care today? Yes      Subjective   Thomas Lei is a 33 y.o. male who presents today for follow up    Referring Provider:  Otilia Haskins APRN  [id number] [facility]  [address]  [place] [address]    Chief Complaint:  ADHD medication check    History of Present Illness:   2/7/23:  Patient presents today via MyChart Video visit from employer in [place], [place], at last visit Adderall XR was increased from 15 mg to 20 mg daily, for which patient reports \"I would like to stay at 20 mg\" denies difficulty sleeping, able to notice increased in ability to focus which was consistent.      Patient insurance is requiring a PA which is currently pending.  Patient reports new insurance started Dec. 2022 and covered dose of 20 mg which was dispensed 12/28/22.  Patient had stopped taking dose on weekends to have adequate supply during the work week, last dose was Friday 2/3/23.      Wellbutrin  mg patient self stopped and reported 1/25/23 due to excessive restlessness while taking with Adderall XR 20 mg.  " "    Patient continues to tolerate Zoloft, anxiety is well controlled.  Denies feeling worried any further with medication.     12/28/22:  Patient presents today via MyChart Video visit from home as patient has 3.5 weeks remaining of maternity leave, increased Adderall XR from 10 mg to 15 mg, which patient reports not noticing a lot of difference in between the different doses.  Patient reports there have been a few days of improvement with focus and ability to complete tasks though not consistent.  Admits to adherence to daily dose.  Denies side effects, though slight decrease in appetite, continues to eat 3 normal size meals with snacks.      Patient reports since increase of Adderall, noting not wearing off until later in the evening, as with the 10 mg noted wearing off earlier around 3 pm.    Overall, patient feels medication does help though not as expected.  Patient has been able to stay on task putting daughter toys together, though feels personal expectations may have been different, as the consistency from day to day is the primary problem patient is experiencing.  \"it feels waveish\", agreeable to start tracking symptoms so at next appointment can provide better details.     Patient continues to experience symptoms of inattentiveness, fidgeting, easy distractability, inability to complete tasks, difficulty sustaining attention, shifting from one uncompleted activity to another, talking excessively, ineffective listening, frequently losing items, and impulsivity.          11/30/22:  Patient presents today via MyChart Video visit from hospital, as patient wife had baby girl on Monday 11/28/22.  Adderall XR 10 mg was started at last visit, for which patient did have difficulty obtaining initially due to pharmacy shortage.  Patient was able to start medication on 11/3/22.  Patient voices some improvement with Adderall XR, additional focus ability, however, around 3-4 pm has been experiencing some grogginess. " Takes daily at 8 am.  Minimal decrease in appetite, denies difficulty with sleep or elevated heart rate.     Patient continues to experience symptoms of inattentiveness, fidgeting, easy distractability, inability to complete tasks, difficulty sustaining attention, shifting from one uncompleted activity to another, talking excessively, ineffective listening, frequently losing items, and impulsivity.     Patient has started new job which is going well, patient does get 12 weeks of paid leave and will be home with wife and other daughter.    Patient reports tolerating Adderall XR with Wellbutrin  mg, denies side effects.         11/3/22:  Patient presents today in office to review ADHD formal testing results and discuss treatment options.  A diagnosed of RAFAEL and ADHD has been confirmed via formal testing on 09/29/2022.     Patient has been doing more meditation and other coping mechanisms -yoga, deep breathing exercises to help manage anxiety, which have been effective.     Patient continues to experience inattentiveness, difficulty with focus, fidgeting, distractability, inability to complete tasks, difficulty sustaining attention, shifting from one uncompleted activity to another, talking excessively, ineffective listening, frequently losing items, and impulsivity.      10/3/22:  Patient presents today via HouseTabt Video visit from home, reporting had ADHD testing last week with Dr.Brian Dunn.    Increased Wellbutrin XL to 300 mg at last visit which patient reports at times has been effective.  Recently switched to night shift, and has 1 yr old, however, while on days was able to notice improvement of symptoms after 4-5 hrs.  Patient will be moved to day shift later this month, however, will be starting with a new employer in Nov. 7, 2022.  Patient reports new job will provide more stability, M-F 9 a-5 pm, and flexibility. Will be on same schedule as wife for the first time.    Patient reports taking the  "Wellbutrin before start of work.  Denies side effects.       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  Patient has never been evaluated for ADHD. Was started on Zoloft 5/2021 and Wellbutrin  mg was added 2/2022 which was initially effective for anxiety though not as much on attention and focus,  as patient reports anxiety had improved, and no longer having panic attacks feelings of heart racing nor palpitations.     Patient recalls having some anxiety in high school, had a 4.3 GPA and upon starting college grades suffered, and GPA was 2.89.  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 Next month will be the 5 th attempt.         1. ADHD:   a. Elementary school:   i. Grades:B's  ii. Special classes or failures:No  iii. Got in trouble:\"few times for not sitting still or seated, but nothing outside of that\"  iv. Referral for ADHD testing:No  b. Fhx:Brother (Armando)- 2 yrs younger, started on medications at age 8, older Half Brother (Carlton) (8-10 yrs older) possibly diagnosed at preteen age-only lived with patient for 4 yrs; Younger half Brother (Renato)-ASD,ADD  c. Presently:  i. Problems with attention to detail:Yes, at work misses data, errors, have to resend emails  ii. Problems with sustained attention:Yes, \"it's either super hyper focused or exact opposite going from task to task, could be the type of work for the day.\"  iii. Problems listening when spoken to directly:No  iv. Failure to finish tasks: Yes,\"extreme procrastination with reports, my biggest thing right now is I am struggling to study for interviews at [employer] for a propmotion 5 th time in August, struggling to get through the " "interview, studying piece, will have classical music in background or gets chimes from email and focuses on that instead of studying.\"  v. Avoids tasks that require sustained mental effort:Yes, avoidance of studying for interview, no other times.   vi. Easily distracted:Yes, music, noises of email chimes, phone calls, text messages, \"I am always plugged in because of the job I am on call 24/7, I feel less distracted since I have been working nights, I am less productive vs day shift because I am more involved in operations.\"  vii. Forgetting things:Yes, \"walk into kitchen, open fridge, ask myself what am I doing here, I have a cooler sitting in garage that needs to be thrown in car for one month now. Few times I actually left laptop at home, work is 1 hr in [place], [place].\"  viii. Losing things:Yes, my wallet is a big one, my wife finds it for me, I have established specific areas otherwise I will be searching for days, I once lost my car keys for 3 days.\"  ix. Hard to organize:Yes, \"I am getting better, I use one note for tasks, make them by priority which has helped. I am messier, dirty clothes will sit on floor instead of going into hamper.\"  x. Talks a lot and cutting people off:No \"I have tried to become more of a listener rather than listening to respond.\" \"I used to cut people off all the time, so I am getting better at that, it is an effort.\" Patient now takes pre-notes prior to meeting, which data is needed in order to answer questions, \"if I don't have it answered, I can get it to you by the end of the day.\"  xi. Drifts off during conversations:Yes if conversation is not entertaining or when wife talks about grocery, \"I stay very engaged in work meetings because of the level of my position, I make a decision to close my laptop, to limit distraction.\"  xii. Difficulty with Reading:No  xiii. Difficulty watching TV/Movies:Yes, \"If I don't find something exciting, I will go to next movie, series or video.  " "Even when I am with my parents, I will pull out phone to get more stimulation.\"     In home setting patient admits to wife having to ask several times if listening, due to not interested in topic, \"a lot of the time I hear her kind of, but I think I make a decision to not respond, it's odd.\" Patient has not worked the last 2 days and has made excuses to not change the cat box.  Has some times of using full day of yard work or house hold chores, \"it's either I am extremely productive or I avoid at all cost. It's a lot of excuses.\"  Patient feels avoidance with wife has been ongoing, no changes. Work has made anxiety worse and was started on medications in the last 1.5 yrs.  Patient has had pressure with interviews and promotions.  Patient reports avoiding laundry and would wear all clothing until none remaining, now wife does all the laundry.  Recalls only completing house hold tasks, such as the dishes 90% and wife has to finish the remaining 10%, \"I am the same with food, there's always 10% left on the plate.\"      Symptoms include fidgeting (knee bouncing or spins phone on table or pen in hands), easy distractability, inability to complete tasks, difficulty sustaining attention, shifting from one uncompleted activity to another, talking excessively, ineffective listening, frequently losing items, and impulsivity.  Patient reports frequently allow services to home: bug company spraying every 3 months, bought a 65,000 truck while in [employer], purchased a 1,200 suit one day which was not thought out, and rarely wears.       PHQ-9 Depression Screening  PHQ-9 Total Score:   1/25/2023 2 , reassess 03/2023    Little interest or pleasure in doing things?     Feeling down, depressed, or hopeless?     Trouble falling or staying asleep, or sleeping too much?     Feeling tired or having little energy?     Poor appetite or overeating?     Feeling bad about yourself - or that you are a failure or have let yourself or your family " down?     Trouble concentrating on things, such as reading the newspaper or watching television?     Moving or speaking so slowly that other people could have noticed? Or the opposite - being so fidgety or restless that you have been moving around a lot more than usual?     Thoughts that you would be better off dead, or of hurting yourself in some way?     PHQ-9 Total Score       RAFAEL-7    11/3/22 3, reassess 03/2023    Past Surgical History:  Past Surgical History:   Procedure Laterality Date   • ENDOSCOPY N/A 4/14/2022    Procedure: ESOPHAGOGASTRODUODENOSCOPY WITH BX;  Surgeon: Joan Luna MD;  Location: [facility] ENDOSCOPY;  Service: Gastroenterology;  Laterality: N/A;  ESOPHAGITIS, GASTRITIS, ESOPHAGEAL STRICTURE   • ENDOSCOPY N/A 5/23/2022    Procedure: ESOPHAGOGASTRODUODENOSCOPY WITH 10-15MM BALLOON DILATATION AND BIOPSIES;  Surgeon: Joan Luna MD;  Location: [facility] ENDOSCOPY;  Service: Gastroenterology;  Laterality: N/A;  GASTRITIS, ESOPHAGITIS, ESOPHAGEAL STRICTURE   • HERNIA REPAIR      Hernia Repair: at age 17; right inguinal; uncomplicated; dual;   • SHOULDER SURGERY      left shoulder labrum repair at age 22 - sports related injury       Problem List:  Patient Active Problem List   Diagnosis   • Esophageal dysphagia   • Anxiety   • Attention and concentration deficit       Allergy:   No Known Allergies     Discontinued Medications:  There are no discontinued medications.    Current Medications:   Current Outpatient Medications   Medication Sig Dispense Refill   • sertraline (ZOLOFT) 50 MG tablet Take 1 tablet by mouth Every Morning. 30 tablet 2   • amphetamine-dextroamphetamine XR (ADDERALL XR) 20 MG 24 hr capsule Take 1 capsule by mouth Every Morning for 30 days Indications: Attention Deficit Hyperactivity Disorder 30 capsule 0     No current facility-administered medications for this visit.       Past Medical History:  Past Medical History:   Diagnosis Date   • Anxiety disorder,  unspecified    • Concussion     X3   • Dysphagia, unspecified    • Fever, unspecified    • Head injury    • Influenza due to identified novel influenza A virus with other respiratory manifestations    • Panic disorder    • PONV (postoperative nausea and vomiting)    • Procreative counseling and advice using natural family planning        Past Psychiatric History:  Began Treatment:2021  Diagnoses:Anxiety  Psychiatrist:Denies  Therapist:Denies  Admission History:Denies  Medication Trials:Wellbutrin XL up to 300 mg self stopped 1/2023 due to excessive restlessness with combination of Adderall XR 20 mg  Self Harm: Denies  Suicide Attempts:Denies      Substance Abuse History:   Types:Denies all, including illicit  Withdrawal Symptoms:Denies  Longest Period Sober:Not Applicable   AA: Not applicable     Social History:  Martial Status:  Employed:Yes and If so, where [employer] and . 9a-5p  Kids:Yes or If so, how many 2 girls    House:Lives in a house   History: [id number]  Access to Guns: Yes, put in storage not in home    Social History     Socioeconomic History   • Marital status:    • Number of children: 1   • Years of education: 16   • Highest education level: Bachelor's degree (e.g., BA, AB, BS)   Tobacco Use   • Smoking status: Never   • Smokeless tobacco: Former     Types: Chew     Quit date: 5/21/2022   Vaping Use   • Vaping Use: Never used   Substance and Sexual Activity   • Alcohol use: Yes     Comment: 1-2 drinks per month    • Drug use: Not Currently     Types: Marijuana     Comment: back in high school   • Sexual activity: Yes       Family History:   Suicide Attempts: Brother  Suicide Completions:Denies      Family History   Problem Relation Age of Onset   • Sjogren's syndrome Mother    • Diverticulitis Mother    • Cardiomyopathy Father    • Suicide Attempts Brother    • Seizures Brother    • ADD / ADHD Brother    • ADD / ADHD Brother     • Cardiomyopathy Paternal Uncle    • Dementia Maternal Grandmother        Developmental History:   Born: [place]  Siblings:5   Childhood: Denies Abuse  High School:Completed  College:Bachelors in K[employer]ology kinesiology     Mental Status Exam:   Hygiene:   good  Cooperation:  Cooperative  Eye Contact:  Good  Psychomotor Behavior:  Appropriate  Affect:  Appropriate  Mood: euthymic  Speech:  Normal  Thought Process:  Goal directed  Thought Content:  Mood congruent  Suicidal:  None  Homicidal:  None  Hallucinations:  None  Delusion:  None  Memory:  Intact  Orientation:  Person, Place, Time and Situation  Reliability:  good  Insight:  Good  Judgement:  Good  Impulse Control:  Good  Physical/Medical Issues:  Yes Esophageal Dysphagia     Review of Systems:  Review of Systems   Constitutional: Negative for diaphoresis and fatigue.   HENT: Negative for drooling.    Eyes: Negative for visual disturbance.   Respiratory: Negative for cough and shortness of breath.    Cardiovascular: Negative for chest pain, palpitations and leg swelling.   Gastrointestinal: Negative for nausea and vomiting.   Endocrine: Negative for cold intolerance and heat intolerance.   Genitourinary: Negative for difficulty urinating.   Musculoskeletal: Negative for joint swelling.   Allergic/Immunologic: Negative for immunocompromised state.   Neurological: Negative for dizziness, tremors, seizures, speech difficulty and numbness.   Psychiatric/Behavioral: Positive for decreased concentration. Negative for hallucinations, self-injury, sleep disturbance and suicidal ideas. The patient is not nervous/anxious and is not hyperactive.          Physical Exam:  Physical Exam  Psychiatric:         Attention and Perception: Attention and perception normal.         Mood and Affect: Mood and affect normal.         Speech: Speech normal.         Behavior: Behavior normal. Behavior is cooperative.         Thought Content: Thought content normal. Thought content does not  include suicidal ideation. Thought content does not include suicidal plan.         Cognition and Memory: Cognition and memory normal.         Judgment: Judgment normal.         Vital Signs:   There were no vitals taken for this visit.     Lab Results:   Clinical Support on 11/03/2022   Component Date Value Ref Range Status   • Amphetamine Screen, Urine 11/03/2022 Negative  Negative Final   • Barbiturates Screen, Urine 11/03/2022 Negative  Negative Final   • Buprenorphine, Screen, Urine 11/03/2022 Negative  Negative Final   • Benzodiazepine Screen, Urine 11/03/2022 Negative  Negative Final   • Cocaine Screen, Urine 11/03/2022 Negative  Negative Final   • MDMA (ECSTASY) 11/03/2022 Negative  Negative Final   • Methamphetamine, Ur 11/03/2022 Negative  Negative Final   • Methadone Screen, Urine 11/03/2022 Negative  Negative Final   • Opiate Screen 11/03/2022 Negative  Negative Final   • Oxycodone Screen, Urine 11/03/2022 Negative  Negative Final   • Phencyclidine (PCP), Urine 11/03/2022 Negative  Negative Final   • THC, Screen, Urine 11/03/2022 Negative  Negative Final   • Lot Number 11/03/2022 [id number]   Final   • Expiration Date 11/03/2022 02/29/2024   Final       EKG Results:  No orders to display       Imaging Results:  No Images in the past 120 days found..      Assessment & Plan       Visit Diagnoses:    ICD-10-CM ICD-9-CM   1. ADHD (attention deficit hyperactivity disorder), inattentive type  F90.0 314.00       PLAN:  1.   Safety: No acute safety concerns  2. Therapy: None  3. Risk Assessment: Risk of self-harm acutely is moderate.  Risk factors include anxiety disorder,family history, access to guns/weapons, and recent psychosocial stressors (pandemic). Protective factors include no present SI, no history of suicide attempts or self-harm in the past, minimal AODA, healthcare seeking, future orientation, willingness to engage in care.  Risk of self-harm chronically is also moderate, but could be further elevated  in the event of treatment noncompliance and/or AODA.  4. Meds:  Continue Zoloft 50 mg by mouth daily to target anxiety.      Continue Adderall XR 20 mg by mouth daily in the morning to target symptoms associated with ADHD. Risks, benefits, side effects discussed with patient including elevated heart rate, elevated blood pressure, irritability, insomnia, sexual dysfunction, appetite suppressing properties, psychosis.  After discussion of these risks and benefits, the patient voiced understanding and agreed to proceed. Channing reviewed, last dispensed 12/28/22 #30/30 days.  New order for 20 mg dose sent 1/26/23 to [facility] pharmacy due to [facility] pharmacy out of stock, however, awaiting PA from insurance at this time.     Controlled substance documentation: Channing reviewed; prior urine drug screen consistent obtained 11/3/22; consent is up to date, signed, witnessed and in EHR, dated 11/3/22, which will be updated annually per policy. Patient is aware of risk of addiction on this medication, understands need to follow up for a review every 3 months and medications will be adjusted or decreased as deemed appropriate at each visit.  No history of drug or alcohol abuse.  No concerns about diversion or abuse. Patient denies side effects related to the medication.  Patient is aware of random urine drug screens and pill counts. The dosing of this medication will be reviewed on a regular basis and reduced if possible..  Ongoing use of a controlled substance is necessary for this patient to have a normal quality of life.  5. Labs: n/a    Patient noted with improvement of ADHD symptoms with dose increase of Adderall XR to 20 mg, however, PA is required which was reviewed with patient during visit.  Patient given detailed information to follow up with insurance company to determine specifics regarding which and how many medications have to be tried before restarting current dose of Adderall and contact office to MA  directly later today.  Patient was not allowed option to pay out of pocket from pharmacy.  Will be in touch with patient later today.        2/6/23:  VICENTA LEI Key: [id number] - PA Case ID: [id number] - Rx #: [id number]  PA for Adderall XR 20mg sent to plan 02/06/2023 awaiting decision from insurance company      2/2/23: Return Patht message from patient wife to PCP which was directed to this provider:   This message is being sent by Cidni Lei on behalf of Vicenta Lei.   Hi Dr. Haskins,     This is Cindi Lei on Vicenta Lei account. I am trying to  his medication, the generic Adderral and they are saying the extended release capsules are on backorder. The only thing the have in stock are non extended release tablets. Are you able to switch the prescription to that so it can be filled until the other kind comes in? I am at [facility] now. Thank you!  Patient is aware that communication with behavioral health has to be done via telephone, and when the pharmacy does not have supply in stock, as this has happened before, he is to contact other pharmacies to determine availability and then inform the office.  The prescription was ordered 1/26/23 which was 7 days ago, patient will need to follow process per CSA.         1/25/23:   After Rescheduling patient today he wanted me to let Celina know that the Adderall XR 20 mg seems to be working well, but he did DC the Wellbutrin due to excessive restlessness.  I rescheduled patient to February 07/2023.      12/28/22:   -Continue Zoloft 50 mg by mouth daily to target anxiety.    -Continue Wellbutrin  mg po daily in the morning to target attention and concentration deficit.      Increase Adderall XR from 15 mg TO 20 mg by mouth daily in the morning to target symptoms associated with ADHD. Risks, benefits, side effects discussed with patient including elevated heart rate, elevated blood pressure, irritability, insomnia, sexual dysfunction, appetite  suppressing properties, psychosis.  After discussion of these risks and benefits, the patient voiced understanding and agreed to proceed. Channing reviewed, last dispensed 15 mg 11/30/22 #30/30 days.  Informed patient order would be sent to JUAN Lang in practice, as  is out of office today, in separate entry for signature due to EMR system, and will not see as refilled on AVS today.  Adderall XR 15 mg providing inconsistent relief of ADHD symptoms, denies side effects, will increase dose today and have patient return in 3 weeks, as patient will be returning to work before 4 weeks from today.  Patient to track daily symptoms, activities, ability to complete tasks and report at next appointment.  Patient instructed to notify provider if experiencing insomnia as combination with Wellbutrin XL may effect sleep and dose may need to be lowered.  Patient to contact provider if symptoms worsen or fail to improve.       11/30/22:  Continue Zoloft 50 mg by mouth daily to target anxiety.      Continue Wellbutrin  mg po daily in the morning to target attention and concentration deficit.  Instructed patient to request refill in 2-3 weeks from pharmacy if continues to tolerate with Adderall XR, otherwise to call office and request dose decrease to 150 mg.   Increase Adderall XR from 10 mg TO 15 mg by mouth daily in the morning to target symptoms associated with ADHD. Risks, benefits, side effects discussed with patient including elevated heart rate, elevated blood pressure, irritability, insomnia, sexual dysfunction, appetite suppressing properties, psychosis.  After discussion of these risks and benefits, the patient voiced understanding and agreed to proceed. Channing reviewed, last dispensed 10 mg dose 11/3/22 #30/30 days.  Informed patient order would be sent to Dr. Singh in separate entry for signature due to EMR system, and will not see as refilled on AVS today.  Controlled substance documentation: Channing  reviewed; prior urine drug screen consistent obtained 11/3/22; consent is up to date, signed, witnessed and in EHR, dated 11/3/22, which will be updated annually per policy. Patient is aware of risk of addiction on this medication, understands need to follow up for a review every 3 months and medications will be adjusted or decreased as deemed appropriate at each visit.  No history of drug or alcohol abuse.  No concerns about diversion or abuse. Patient denies side effects related to the medication.  Patient is aware of random urine drug screens and pill counts. The dosing of this medication will be reviewed on a regular basis and reduced if possible..  Ongoing use of a controlled substance is necessary for this patient to have a normal quality of life.    Anxiety symptoms are under good control with Zoloft and Wellbutrin XL.  ADHD symptoms remain present, minimal effectiveness with 10 mg dose of Adderall XR, tolerating well without side effects, will increase dose today and send to [facility] pharmacy, patient instructed to contact either [place] office and leave vm with MA and if not heard back within 1-2 hrs to call the [place] office, or patient may call the [facility] office initially as MA is not physically in the [place] office today though checks vm.  Patient verbalized understanding. Patient to contact provider if symptoms worsen or fail to improve.     11/3/22:   Patient called to inform [facility], primary pharmacy, does not have Adderall XR 10 mg in stock, patient was instructed to call other local pharmacies to check availability.   Patient called back to tell us that he checked around and [facility] has the Adderall.  Please send there Pharmacy changed and med pended in chart    11/3/22:  Continue Zoloft 50 mg by mouth daily to target anxiety.      Continue Wellbutrin  mg po daily in the morning to target attention and concentration deficit.      Start Adderall XR 10 mg by mouth  daily in the morning to target symptoms associated with ADHD. Risks, benefits, side effects discussed with patient including elevated heart rate, elevated blood pressure, irritability, insomnia, sexual dysfunction, appetite suppressing properties, psychosis.  After discussion of these risks and benefits, the patient voiced understanding and agreed to proceed. Channing reviewed, UDS ordered, and controlled substance agreement signed & witnessed. Informed patient prescription would not be ordered until UDS results noted as negative, expected results within 30 minutes of providing sample.   POC UDS today in clinic  ADHD testing results reviewed from Dr. Eladio Dunn,[facility] dated 9/29/2022 indicating a diagnosis of RAFAEL and ADHD.     Patient to request refills for Wellbutrin XL and Zoloft in approximately 2 weeks, as patient has been educated he may need to decrease Wellbutrin XL dose due to addition of Adderall XR.  Patient to contact provider if symptoms worsen or fail to improve.     10/3/22:   -Continue Zoloft 50 mg by mouth daily to target anxiety.    -Continue Wellbutrin  mg po daily in the morning to target attention and concentration deficit.     Patient denies need for refills at this time, tolerating current medications well without reported side effects.  Patient reported completing ADHD testing last week with Dr. Eladio Dunn, report not expected for at least 2 weeks. Will plan on next appointment in 4 weeks in person due to possible stimulant medication needed, as patient will be starting an new job Nov 6 and would prefer to come in the week prior.  If ADHD report findings indicate no formal diagnosis of ADHD will contact patient prior to next appointment.  Patient to contact provider if symptoms worsen or fail to improve.       7/26/22:   Declined therapy  Continue Zoloft 50 mg by mouth daily to target anxiety.  Risks, benefits, alternatives discussed with patient including GI upset, nausea vomiting  diarrhea, theoretical decrease of seizure threshold predisposing the patient to a slightly higher seizure risk, headaches, sexual dysfunction, serotonin syndrome, bleeding risk, increased suicidality in patients 24 years and younger.  After discussion of these risks and benefits, the patient voiced understanding and agreed to proceed.  Increase Wellbutrin XL from 150 mg to 300 mg (instructed to start taking 2 tabs of the 150 mg on hand until new prescription picked up) po daily in the morning to target attention and concentration deficit. Discussed all risks, benefits, alternatives, and side effects of Bupropion/Wellbutrin including but not limited to GI upset (N/V/D, constipation), tachycardia, diaphoresis, weight loss, agitation, dizziness, headache, insomnia, tremor, blurred vision, anorexia, HTN, activation of char or hypomania, CNS stimulation and neuropsychiatric effects, ocular effects, seizure risk, withdrawal syndrome following abrupt discontinuation, and activation of suicidal ideation and behavior. Patient  educated on the need to practice safe sex while taking this med. Discussed the need for patient to immediately call the office for any new or worsening symptoms, such as worsening depression; feeling nervous or restless; suicidal thoughts or actions; or other changes in mood or behavior, and all other concerns. Patient educated on medication compliance. Patient  verbalized understanding and is agreeable to taking Bupropion/Wellbutrin. Addressed all questions and concerns.     Referral for ADHD testing.  Patient to contact provider and set up appointment.  And to contact office if unable to get an appointment scheduled. -Attached list of several other sites for ADHD testing. Patient instructed to contact providers on list to determine availability of appointments, instructed patient to take notes while calling places indicating first available appointment, and to ask to be placed on waiting list.  If  an office is chosen and requests the referral order please contact my Medical Assistant, Serene, directly at [phone] informing of chosen office and the information will be sent.    Dr. Eladio Dunn, Psychologist, MS, LPP  [address], [address]  [place] [id number] [phone]   Currently only accepting referrals for psychological testing services.  Accepts Most insurance plans except Medicare Plans    Patient presentation seems most consistent with anxiety vs ADHD, due to high suspicion of ADHD will refer for formal testing.  Increased Wellbutrin XL today , will see patient back in 4 weeks.       Patient screened positive for depression based on a PHQ-9 score of 2 on 1/25/2023. Follow-up recommendations include: Prescribed antidepressant medication treatment and Suicide Risk Assessment performed.     TREATMENT PLAN/GOALS: Continue supportive psychotherapy efforts and medications as indicated. Treatment and medication options discussed during today's visit. Patient ackowledged and verbally consented to continue with current treatment plan and was educated on the importance of compliance with treatment and follow-up appointments.    MEDICATION ISSUES:  ANUSHA reviewed as expected.  Discussed medication options and treatment plan of prescribed medication as well as the risks, benefits, and side effects including potential falls, possible impaired driving and metabolic adversities among others. Patient is agreeable to call the office with any worsening of symptoms or onset of side effects. Patient is agreeable to call 911 or go to the nearest ER should he/she begin having SI/HI. No medication side effects or related complaints today.     MEDS ORDERED DURING VISIT:  No orders of the defined types were placed in this encounter.      Return in about 5 weeks (around 3/14/2023) for Video visit, medication check.         I spent 33 minutes caring for Thomas on this date of service. This time includes  time spent by me in the following activities: preparing for the visit, performing a medically appropriate examination and/or evaluation, counseling and educating the patient/family/caregiver, referring and communicating with other health care professionals, documenting information in the medical record and care coordination.      This document has been electronically signed by JUAN Hayes  February 7, 2023 10:02 EST      Part of this note may be an electronic transcription/translation of spoken language to printed text using the Dragon Dictation System.

## 2023-02-09 NOTE — TELEPHONE ENCOUNTER
? Your request has been approved  Your PA request has been approved. Additional information will be provided in the approval communication. 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 -TC, LPN      ADDERALL XR 20MG

## 2023-03-08 DIAGNOSIS — F41.1 GENERALIZED ANXIETY DISORDER: ICD-10-CM

## 2023-03-08 NOTE — TELEPHONE ENCOUNTER
Medication refill request for Zoloft 50mg.     Pt has upcoming appt on 03/15/2023.     Medication order pended.

## 2023-03-15 ENCOUNTER — TELEPHONE (OUTPATIENT)
Dept: PSYCHIATRY | Facility: CLINIC | Age: 33
End: 2023-03-15
Payer: COMMERCIAL

## 2023-03-15 ENCOUNTER — TELEMEDICINE (OUTPATIENT)
Dept: PSYCHIATRY | Facility: CLINIC | Age: 33
End: 2023-03-15
Payer: COMMERCIAL

## 2023-03-15 DIAGNOSIS — F90.0 ENCOUNTER FOR MEDICATION MANAGEMENT IN ATTENTION DEFICIT HYPERACTIVITY DISORDER (ADHD), INATTENTIVE TYPE: ICD-10-CM

## 2023-03-15 DIAGNOSIS — F90.0 ADHD (ATTENTION DEFICIT HYPERACTIVITY DISORDER), INATTENTIVE TYPE: Primary | ICD-10-CM

## 2023-03-15 DIAGNOSIS — Z79.899 ENCOUNTER FOR MEDICATION MANAGEMENT IN ATTENTION DEFICIT HYPERACTIVITY DISORDER (ADHD), INATTENTIVE TYPE: ICD-10-CM

## 2023-03-15 PROCEDURE — 99215 OFFICE O/P EST HI 40 MIN: CPT | Performed by: NURSE PRACTITIONER

## 2023-03-15 RX ORDER — DEXTROAMPHETAMINE SACCHARATE, AMPHETAMINE ASPARTATE MONOHYDRATE, DEXTROAMPHETAMINE SULFATE AND AMPHETAMINE SULFATE 5; 5; 5; 5 MG/1; MG/1; MG/1; MG/1
20 CAPSULE, EXTENDED RELEASE ORAL EVERY MORNING
COMMUNITY
End: 2023-03-15 | Stop reason: RX

## 2023-03-15 NOTE — PATIENT INSTRUCTIONS
"1.  Please return to clinic at your next scheduled visit.  Contact the [facility] ([phone]) or **Serene, Medical Assistant at [place] Office directly at [phone] at least 24 hours prior in the event you need to cancel.**    2. Should you want to get in touch with your provider, JUAN Hayes, please contact MY Medical Assistant, Serene, directly at [phone].  Recommend saving Serene's direct number in phone as this is the PREFERRED & EASIEST way to get in contact with your provider.  Please leave a voice mail if you do not get an answer and she will return your call within 24 hrs. You will NOT be able to contact provider on [facility], as Behavioral Health Providers are restricted. YOU MUST CALL [phone]    If you need to speak with the on call provider after hours or on weekends, please Contact the [facility] ([phone]) and staff will be able to page the provider on call directly.        3, MEDICATION REFILLS:  PLEASE CALL THE PHARMACY TO REQUEST ALL MEDICATION REFILLS TO ENSURE YOU ARE RECEIVING YOUR MEDICATIONS IN A TIMELY MANNER.    IF YOU USE AN AUTOMATED SERVICE AT THE PHARMACY FOR REFILLS AND ARE TOLD THERE ARE \"NO REFILLS REMAINING\"   PLEASE CALL THE PHARMACY & SPEAK TO A LIVE PERSON TO VERIFY IT IS THE MOST UP TO DATE PRESCRIPTION ON FILE.    All new prescriptions will have a different number, therefore, if you were given refills for a medication today or at last visit it will not have the same number as the previous prescription.       4.  In the event you have personal crisis, contact the following crisis numbers: Suicide Prevention Hotline 1-689.935.4903 or *988, JOAQUIM Helpline 6-323-529-JOAQUIM; [facility] Emergency Room [phone]; text HELLO to 940909; or 172.      5. We would appreciate your feedback, please scan the QRS code on the back of your appointment card (or see below) and complete a brief survey.  [place] location is still not available, so " "please click \"[place]\" location.  Thank you      SPECIFIC RECOMMENDATIONS:     1.      Medications discussed at this encounter:                   - Start Azstarys (Serdexmethylphenidate and dexmethylphenidate) 26.1 mg-5.2 mg by mouth daily in the morning on an empty stomach (may eat in 20-30 minutes to help with absorption)      2.      Psychotherapy recommendations: Declined     3.     Return to clinic: 4 weeks    Stop by [place] office to sign medication form for the Azstarys within 1 week.  And call office if unable to obtain medication by tomorrow.     Please arrive at least 15 minutes before your scheduled appointment time to complete check in process.      IF you are scheduled for a [employer] VIDEO visit, PLEASE ANSWER YOUR PHONE WHEN OFFICE CALLS PRIOR TO VISIT TO COMPLETE THE CHECK IN PROCESS, EVEN IF THE E-CHECK IN WAS COMPLETED.     If you would like to log on to [employer] and complete the \"E-Check IN\" prior to your visit, please do so, this will speed up the check in process.  If you are due for questionnaires, you will find those on [employer] as well, please try to complete prior to your scheduled appointment.          "

## 2023-03-15 NOTE — PROGRESS NOTES
This provider [place] [address] in closed office to ensure privacy. The Patient is seen remotely using [employer] (deja mi)hart. Patient is being seen via telehealth and confirm that they are in a secure environment for this session. The patient's condition being diagnosed/treated is appropriate for telemedicine. The provider identified himself/herself: herself as well as her credentials.   The patient gave consent to be seen remotely, and when consent is given they understand that the consent allows for patient identifiable information to be sent to a third party as needed.   They may refuse to be seen remotely at any time. The electronic data is encrypted and password protected, and the patient has been advised of the potential risks to privacy not withstanding such measures.    You have chosen to receive care through a telehealth visit.  Do you consent to use a video/audio connection for your medical care today? Yes    Subjective   Thomas Lei is a 33 y.o. male who presents today for follow up    Referring Provider:  Otilia Haskins, APRN  [id number] [facility] [address]  [place] [address]    Chief Complaint:  ADHD,  Medication management     History of Present Illness:   3/15/23:  Patient presents today via MyChart Video visit from home, located in [place], [place].   Patient reports having difficulty finding a pharmacy that had Adderall XR 20 mg, and received 2 denial letters, 2nd on 2/13/23 after approval of Adderall XR on 2/9/23, and an approval.  Patient has not had medication for at least 30 days.      Symptoms of ADHD remain present, continues to have difficulty sustaining attention, impaired concentration, shifting from one uncompleted activity to another, which has effected daily functioning. Patient would like to switch to alternate medication due to nationwide shortage of Adderall.        2/7/23:  Patient presents today via MyChart Video visit from employer in [place],  "[place], at last visit Adderall XR was increased from 15 mg to 20 mg daily, for which patient reports \"I would like to stay at 20 mg\" denies difficulty sleeping, able to notice increased in ability to focus which was consistent.      Patient insurance is requiring a PA which is currently pending.  Patient reports new insurance started Dec. 2022 and covered dose of 20 mg which was dispensed 12/28/22.  Patient had stopped taking dose on weekends to have adequate supply during the work week, last dose was Friday 2/3/23.      Wellbutrin  mg patient self stopped and reported 1/25/23 due to excessive restlessness while taking with Adderall XR 20 mg.      Patient continues to tolerate Zoloft, anxiety is well controlled.  Denies feeling worried any further with medication.     12/28/22:  Patient presents today via [employer] [facility] Video visit from home as patient has 3.5 weeks remaining of maternity leave, increased Adderall XR from 10 mg to 15 mg, which patient reports not noticing a lot of difference in between the different doses.  Patient reports there have been a few days of improvement with focus and ability to complete tasks though not consistent.  Admits to adherence to daily dose.  Denies side effects, though slight decrease in appetite, continues to eat 3 normal size meals with snacks.      Patient reports since increase of Adderall, noting not wearing off until later in the evening, as with the 10 mg noted wearing off earlier around 3 pm.    Overall, patient feels medication does help though not as expected.  Patient has been able to stay on task putting daughter toys together, though feels personal expectations may have been different, as the consistency from day to day is the primary problem patient is experiencing.  \"it feels waveish\", agreeable to start tracking symptoms so at next appointment can provide better details.     Patient continues to experience symptoms of inattentiveness, fidgeting, easy distractability, " inability to complete tasks, difficulty sustaining attention, shifting from one uncompleted activity to another, talking excessively, ineffective listening, frequently losing items, and impulsivity.          11/30/22:  Patient presents today via MyChart Video visit from hospital, as patient wife had baby girl on Monday 11/28/22.  Adderall XR 10 mg was started at last visit, for which patient did have difficulty obtaining initially due to pharmacy shortage.  Patient was able to start medication on 11/3/22.  Patient voices some improvement with Adderall XR, additional focus ability, however, around 3-4 pm has been experiencing some grogginess. Takes daily at 8 am.  Minimal decrease in appetite, denies difficulty with sleep or elevated heart rate.     Patient continues to experience symptoms of inattentiveness, fidgeting, easy distractability, inability to complete tasks, difficulty sustaining attention, shifting from one uncompleted activity to another, talking excessively, ineffective listening, frequently losing items, and impulsivity.     Patient has started new job which is going well, patient does get 12 weeks of paid leave and will be home with wife and other daughter.    Patient reports tolerating Adderall XR with Wellbutrin  mg, denies side effects.         11/3/22:  Patient presents today in office to review ADHD formal testing results and discuss treatment options.  A diagnosed of RAFAEL and ADHD has been confirmed via formal testing on 09/29/2022.     Patient has been doing more meditation and other coping mechanisms -yoga, deep breathing exercises to help manage anxiety, which have been effective.     Patient continues to experience inattentiveness, difficulty with focus, fidgeting, distractability, inability to complete tasks, difficulty sustaining attention, shifting from one uncompleted activity to another, talking excessively, ineffective listening, frequently losing items, and impulsivity.   "    10/3/22:  Patient presents today via MyChart Video visit from home, reporting had ADHD testing last week with Dr.Brian Dunn.    Increased Wellbutrin XL to 300 mg at last visit which patient reports at times has been effective.  Recently switched to night shift, and has 1 yr old, however, while on days was able to notice improvement of symptoms after 4-5 hrs.  Patient will be moved to day shift later this month, however, will be starting with a new employer in Nov. 7, 2022.  Patient reports new job will provide more stability, M-F 9 a-5 pm, and flexibility. Will be on same schedule as wife for the first time.    Patient reports taking the Wellbutrin before start of work.  Denies side effects.       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  Patient has never been evaluated for ADHD. Was started on Zoloft 5/2021 and Wellbutrin  mg was added 2/2022 which was initially effective for anxiety though not as much on attention and focus,  as patient reports anxiety had improved, and no longer having panic attacks feelings of heart racing nor palpitations.     Patient recalls having some anxiety in high school, had a 4.3 GPA and upon starting college grades suffered, and GPA was 2.89.  Patient is currently working as a [employer]. at [employer], and is trying for a promotion which has been a struggle due to difficulty focusing on studying for the position. Next month will be the 5 th attempt.         1. ADHD:   a. Elementary school:   i. Grades:B's  ii. Special classes or failures:No  iii. Got in trouble:\"few times for not sitting still or seated, but nothing outside of that\"  iv. Referral for ADHD testing:No  b. Fhx:Brother (Armando)- 2 yrs younger, started on medications at age 8, older Half Brother (Carlton) (8-10 yrs " "older) possibly diagnosed at preteen age-only lived with patient for 4 yrs; Younger half Brother (Creed)-ASD,ADD  c. Presently:  i. Problems with attention to detail:Yes, at work misses data, errors, have to resend emails  ii. Problems with sustained attention:Yes, \"it's either super hyper focused or exact opposite going from task to task, could be the type of work for the day.\"  iii. Problems listening when spoken to directly:No  iv. Failure to finish tasks: Yes,\"extreme procrastination with reports, my biggest thing right now is I am struggling to study for interviews at [employer] for a propmotion 5 th time in August, struggling to get through the interview, studying piece, will have classical music in background or gets chimes from email and focuses on that instead of studying.\"  v. Avoids tasks that require sustained mental effort:Yes, avoidance of studying for interview, no other times.   vi. Easily distracted:Yes, music, noises of email chimes, phone calls, text messages, \"I am always plugged in because of the job I am on call 24/7, I feel less distracted since I have been working nights, I am less productive vs day shift because I am more involved in operations.\"  vii. Forgetting things:Yes, \"walk into kitchen, open fridge, ask myself what am I doing here, I have a cooler sitting in garage that needs to be thrown in car for one month now. Few times I actually left laptop at home, work is 1 hr in [place], [place].\"  viii. Losing things:Yes, my wallet is a big one, my wife finds it for me, I have established specific areas otherwise I will be searching for days, I once lost my car keys for 3 days.\"  ix. Hard to organize:Yes, \"I am getting better, I use one note for tasks, make them by priority which has helped. I am messier, dirty clothes will sit on floor instead of going into hamper.\"  x. Talks a lot and cutting people off:No \"I have tried to become more of a listener rather than listening to respond.\" \"I used " "to cut people off all the time, so I am getting better at that, it is an effort.\" Patient now takes pre-notes prior to meeting, which data is needed in order to answer questions, \"if I don't have it answered, I can get it to you by the end of the day.\"  xi. Drifts off during conversations:Yes if conversation is not entertaining or when wife talks about grocery, \"I stay very engaged in work meetings because of the level of my position, I make a decision to close my laptop, to limit distraction.\"  xii. Difficulty with Reading:No  xiii. Difficulty watching TV/Movies:Yes, \"If I don't find something exciting, I will go to next movie, series or video.  Even when I am with my parents, I will pull out phone to get more stimulation.\"     In home setting patient admits to wife having to ask several times if listening, due to not interested in topic, \"a lot of the time I hear her kind of, but I think I make a decision to not respond, it's odd.\" Patient has not worked the last 2 days and has made excuses to not change the cat box.  Has some times of using full day of yard work or house hold chores, \"it's either I am extremely productive or I avoid at all cost. It's a lot of excuses.\"  Patient feels avoidance with wife has been ongoing, no changes. Work has made anxiety worse and was started on medications in the last 1.5 yrs.  Patient has had pressure with interviews and promotions.  Patient reports avoiding laundry and would wear all clothing until none remaining, now wife does all the laundry.  Recalls only completing house hold tasks, such as the dishes 90% and wife has to finish the remaining 10%, \"I am the same with food, there's always 10% left on the plate.\"      Symptoms include fidgeting (knee bouncing or spins phone on table or pen in hands), easy distractability, inability to complete tasks, difficulty sustaining attention, shifting from one uncompleted activity to another, talking excessively, ineffective listening, " frequently losing items, and impulsivity.  Patient reports frequently allow services to home: bug company spraying every 3 months, bought a 65,000 truck while in [employer], purchased a 1,200 suit one day which was not thought out, and rarely wears.       PHQ-9 Depression Screening  PHQ-9 Total Score:   1/25/2023 2 , reassess 04/2023    Little interest or pleasure in doing things?     Feeling down, depressed, or hopeless?     Trouble falling or staying asleep, or sleeping too much?     Feeling tired or having little energy?     Poor appetite or overeating?     Feeling bad about yourself - or that you are a failure or have let yourself or your family down?     Trouble concentrating on things, such as reading the newspaper or watching television?     Moving or speaking so slowly that other people could have noticed? Or the opposite - being so fidgety or restless that you have been moving around a lot more than usual?     Thoughts that you would be better off dead, or of hurting yourself in some way?     PHQ-9 Total Score       RAFAEL-7    11/3/22 3, reassess 04/2023    Past Surgical History:  Past Surgical History:   Procedure Laterality Date   • ENDOSCOPY N/A 4/14/2022    Procedure: ESOPHAGOGASTRODUODENOSCOPY WITH BX;  Surgeon: Joan Luna MD;  Location: [facility] ENDOSCOPY;  Service: Gastroenterology;  Laterality: N/A;  ESOPHAGITIS, GASTRITIS, ESOPHAGEAL STRICTURE   • ENDOSCOPY N/A 5/23/2022    Procedure: ESOPHAGOGASTRODUODENOSCOPY WITH 10-15MM BALLOON DILATATION AND BIOPSIES;  Surgeon: Joan Luna MD;  Location: [facility] ENDOSCOPY;  Service: Gastroenterology;  Laterality: N/A;  GASTRITIS, ESOPHAGITIS, ESOPHAGEAL STRICTURE   • HERNIA REPAIR      Hernia Repair: at age 17; right inguinal; uncomplicated; dual;   • SHOULDER SURGERY      left shoulder labrum repair at age 22 - sports related injury       Problem List:  Patient Active Problem List   Diagnosis   • Esophageal dysphagia   • Anxiety   • Attention  and concentration deficit       Allergy:   No Known Allergies     Discontinued Medications:  Medications Discontinued During This Encounter   Medication Reason   • amphetamine-dextroamphetamine XR (ADDERALL XR) 20 MG 24 hr capsule Availability       Current Medications:   Current Outpatient Medications   Medication Sig Dispense Refill   • sertraline (ZOLOFT) 50 MG tablet Take 1 tablet by mouth Every Morning. 30 tablet 5     No current facility-administered medications for this visit.       Past Medical History:  Past Medical History:   Diagnosis Date   • Anxiety disorder, unspecified    • Concussion     X3   • Dysphagia, unspecified    • Fever, unspecified    • Head injury    • Influenza due to identified novel influenza A virus with other respiratory manifestations    • Panic disorder    • PONV (postoperative nausea and vomiting)    • Procreative counseling and advice using natural family planning        Past Psychiatric History:  Began Treatment:2021  Diagnoses:Anxiety  Psychiatrist:Betoies  Therapist:Denies  Admission History:Denies  Medication Trials:Wellbutrin XL up to 300 mg self stopped 1/2023 due to excessive restlessness with combination of Adderall XR 20 mg  Self Harm: Denies  Suicide Attempts:Denies      Substance Abuse History:   Types:Denies all, including illicit  Withdrawal Symptoms:Denies  Longest Period Sober:Not Applicable   AA: Not applicable     Social History:  Martial Status:  Employed:Yes and If so, where [employer] and . 9a-5p  Kids:Yes or If so, how many 2 girls    House:Lives in a house   History: [id number]  Access to Guns: Yes, put in storage not in home    Social History     Socioeconomic History   • Marital status:    • Number of children: 1   • Years of education: 16   • Highest education level: Bachelor's degree (e.g., BA, AB, BS)   Tobacco Use   • Smoking status: Never   • Smokeless tobacco: Former     Types:  Alysa     Quit date: 5/21/2022   Vaping Use   • Vaping Use: Never used   Substance and Sexual Activity   • Alcohol use: Yes     Comment: 1-2 drinks per month    • Drug use: Not Currently     Types: Marijuana     Comment: back in high school   • Sexual activity: Yes       Family History:   Suicide Attempts: Brother  Suicide Completions:Denies      Family History   Problem Relation Age of Onset   • Sjogren's syndrome Mother    • Diverticulitis Mother    • Cardiomyopathy Father    • Suicide Attempts Brother    • Seizures Brother    • ADD / ADHD Brother    • ADD / ADHD Brother    • Cardiomyopathy Paternal Uncle    • Dementia Maternal Grandmother        Developmental History:   Born: [place]  Siblings:5   Childhood: Denies Abuse  High School:Completed  College:Bachelors in [employer] kinesiology     Mental Status Exam:   Hygiene:   good  Cooperation:  Cooperative  Eye Contact:  Good  Psychomotor Behavior:  Appropriate  Affect:  Appropriate  Mood: euthymic  Speech:  Normal  Thought Process:  Goal directed  Thought Content:  Mood congruent  Suicidal:  None  Homicidal:  None  Hallucinations:  None  Delusion:  None  Memory:  Intact  Orientation:  Person, Place, Time and Situation  Reliability:  good  Insight:  Good  Judgement:  Good  Impulse Control:  Good  Physical/Medical Issues:  Yes Esophageal Dysphagia     Review of Systems:  Review of Systems   Constitutional: Negative for diaphoresis and fatigue.   HENT: Negative for drooling.    Eyes: Negative for visual disturbance.   Respiratory: Negative for cough and shortness of breath.    Cardiovascular: Negative for chest pain, palpitations and leg swelling.   Gastrointestinal: Negative for nausea and vomiting.   Endocrine: Negative for cold intolerance and heat intolerance.   Genitourinary: Negative for difficulty urinating.   Musculoskeletal: Negative for joint swelling.   Allergic/Immunologic: Negative for immunocompromised state.   Neurological: Negative for dizziness, tremors,  seizures, speech difficulty and numbness.   Psychiatric/Behavioral: Positive for decreased concentration. Negative for hallucinations, self-injury, sleep disturbance and suicidal ideas. The patient is not nervous/anxious and is not hyperactive.          Physical Exam:  Physical Exam  Psychiatric:         Attention and Perception: Attention and perception normal.         Mood and Affect: Mood and affect normal.         Speech: Speech normal.         Behavior: Behavior normal. Behavior is cooperative.         Thought Content: Thought content normal. Thought content does not include suicidal ideation. Thought content does not include suicidal plan.         Cognition and Memory: Cognition and memory normal.         Judgment: Judgment normal.         Vital Signs:   There were no vitals taken for this visit.     Lab Results:   Clinical Support on 11/03/2022   Component Date Value Ref Range Status   • Amphetamine Screen, Urine 11/03/2022 Negative  Negative Final   • Barbiturates Screen, Urine 11/03/2022 Negative  Negative Final   • Buprenorphine, Screen, Urine 11/03/2022 Negative  Negative Final   • Benzodiazepine Screen, Urine 11/03/2022 Negative  Negative Final   • Cocaine Screen, Urine 11/03/2022 Negative  Negative Final   • MDMA (ECSTASY) 11/03/2022 Negative  Negative Final   • Methamphetamine, Ur 11/03/2022 Negative  Negative Final   • Methadone Screen, Urine 11/03/2022 Negative  Negative Final   • Opiate Screen 11/03/2022 Negative  Negative Final   • Oxycodone Screen, Urine 11/03/2022 Negative  Negative Final   • Phencyclidine (PCP), Urine 11/03/2022 Negative  Negative Final   • THC, Screen, Urine 11/03/2022 Negative  Negative Final   • Lot Number 11/03/2022 [id number]   Final   • Expiration Date 11/03/2022 02/29/2024   Final       EKG Results:  No orders to display       Imaging Results:  No Images in the past 120 days found..      Assessment & Plan       Visit Diagnoses:    ICD-10-CM ICD-9-CM   1. ADHD (attention  deficit hyperactivity disorder), inattentive type  F90.0 314.00   2. Encounter for medication management in attention deficit hyperactivity disorder (ADHD), inattentive type  Z79.899 V58.69    F90.0 314.01       PLAN:  1.   Safety: No acute safety concerns  2. Therapy: None  3. Risk Assessment: Risk of self-harm acutely is moderate.  Risk factors include anxiety disorder,family history, access to guns/weapons, and recent psychosocial stressors (pandemic). Protective factors include no present SI, no history of suicide attempts or self-harm in the past, minimal AODA, healthcare seeking, future orientation, willingness to engage in care.  Risk of self-harm chronically is also moderate, but could be further elevated in the event of treatment noncompliance and/or AODA.  4. Meds:  Continue Zoloft 50 mg by mouth daily to target anxiety.      Discontinue Adderall XR 20 mg due to nationwide shortage patient has not been able to locate a pharmacy that has medication in stock, and has not had medication for 1 month.  last dispensed 12/28/22 #30/30 days.      Start Azstarys (Serdexmethylphenidate and dexmethylphenidate) 26.1 mg-5.2 mg by mouth daily in the morning on an empty stomach (may eat in 20-30 minutes to help with absorption) to target symptoms associated with ADHD. Risks, benefits, side effects discussed with patient including elevated heart rate, elevated blood pressure, irritability, insomnia, sexual dysfunction, appetite suppressing properties, psychosis. After discussion of these risks and benefits, the patient voiced understanding and agreed to proceed. Informed patient order would be sent to Dr. Singh in separate entry for signature due to EMR system, and will not see as refilled on AVS today.  Instructed patient to come by [place] office within the next week to sign medication portion controlled substance for Azstarys.   Controlled substance documentation: [place] reviewed; prior urine drug screen consistent  "obtained 11/3/22; consent is up to date, signed, witnessed and in EHR, dated 11/3/22, which will be updated annually per policy. Patient is aware of risk of addiction on this medication, understands need to follow up for a review every 3 months and medications will be adjusted or decreased as deemed appropriate at each visit.  No history of drug or alcohol abuse.  No concerns about diversion or abuse. Patient denies side effects related to the medication.  Patient is aware of random urine drug screens and pill counts. The dosing of this medication will be reviewed on a regular basis and reduced if possible..  Ongoing use of a controlled substance is necessary for this patient to have a normal quality of life.  5. Labs: n/a      Due to nationwide shortage of Adderall, discussed various options for ADHD treatment, patient willing to switch to alternate medication. Symptoms of ADHD remain present as patient has not had medication for at least 1 month.  Patient to contact provider if unable to  medication by tomorrow, given direct contact number for [facility] at [facility].  Patient to contact provider if symptoms worsen or fail to improve.       2/9/23:   Your request has been approved  Your PA request has been approved. Additional information will be provided in the approval communication. **LPN Reviewed- Complete Clinical**COT, paid claim//MDO verified information// Last paid claim 12-// Adderall XR 20 MG #30/30ds//F90.0 Attn-defct hyperactivity disorder, predom inattentive type//75 Prior Authorization Reqrd +MUST Use generics, AZSTARYS, AMALIA PM, MYDAYIS, [employer] PA ONLY [phone] Drug Requires Prior Authorization// Approve, 36 months. -STEVIE, LPN       ADDERALL XR 20MG            11:56 AM    Serene Salazar MA contacted Thomas Lei \"Renard\"    Serene Salazar MA         11:58 AM  Note    Called patient to let him know that we have received the approval for his Adderall XR " 20mg        2/7/23:  Continue Zoloft 50 mg by mouth daily to target anxiety.      Continue Adderall XR 20 mg by mouth daily in the morning to target symptoms associated with ADHD. Risks, benefits, side effects discussed with patient including elevated heart rate, elevated blood pressure, irritability, insomnia, sexual dysfunction, appetite suppressing properties, psychosis.  After discussion of these risks and benefits, the patient voiced understanding and agreed to proceed. Channing reviewed, last dispensed 12/28/22 #30/30 days.  New order for 20 mg dose sent 1/26/23 to [facility] pharmacy due to [facility] pharmacy out of stock, however, awaiting PA from insurance at this time.     Patient noted with improvement of ADHD symptoms with dose increase of Adderall XR to 20 mg, however, PA is required which was reviewed with patient during visit.  Patient given detailed information to follow up with insurance company to determine specifics regarding which and how many medications have to be tried before restarting current dose of Adderall and contact office to MA directly later today.  Patient was not allowed option to pay out of pocket from pharmacy.  Will be in touch with patient later today.        2/6/23:  VICENTA LEI Key: [id number] - PA Case ID: [id number] - Rx #: [id number]  PA for Adderall XR 20mg sent to plan 02/06/2023 awaiting decision from insurance company      2/2/23: Morizonhart message from patient wife to PCP which was directed to this provider:   This message is being sent by Cindi Lei on behalf of Vicenta Lei.   Hi Dr. Haskins,     This is Cindi Lei on Vicenta Lei account. I am trying to  his medication, the generic Adderral and they are saying the extended release capsules are on backorder. The only thing the have in stock are non extended release tablets. Are you able to switch the prescription to that so it can be filled until the other kind comes in? [facility]  Thank you!  Patient is aware that communication with behavioral health has to be done via telephone, and when the pharmacy does not have supply in stock, as this has happened before, he is to contact other pharmacies to determine availability and then inform the office.  The prescription was ordered 1/26/23 which was 7 days ago, patient will need to follow process per CSA.         1/25/23:   After Rescheduling patient today he wanted me to let Celina know that the Adderall XR 20 mg seems to be working well, but he did DC the Wellbutrin due to excessive restlessness.  I rescheduled patient to February 07/2023.      12/28/22:   -Continue Zoloft 50 mg by mouth daily to target anxiety.    -Continue Wellbutrin  mg po daily in the morning to target attention and concentration deficit.      Increase Adderall XR from 15 mg TO 20 mg by mouth daily in the morning to target symptoms associated with ADHD. Risks, benefits, side effects discussed with patient including elevated heart rate, elevated blood pressure, irritability, insomnia, sexual dysfunction, appetite suppressing properties, psychosis.  After discussion of these risks and benefits, the patient voiced understanding and agreed to proceed. Channing reviewed, last dispensed 15 mg 11/30/22 #30/30 days.  Informed patient order would be sent to JUAN Lang in practice, as  is out of office today, in separate entry for signature due to EMR system, and will not see as refilled on AVS today.  Adderall XR 15 mg providing inconsistent relief of ADHD symptoms, denies side effects, will increase dose today and have patient return in 3 weeks, as patient will be returning to work before 4 weeks from today.  Patient to track daily symptoms, activities, ability to complete tasks and report at next appointment.  Patient instructed to notify provider if experiencing insomnia as combination with Wellbutrin XL may effect sleep and dose may need to be lowered.  Patient to  contact provider if symptoms worsen or fail to improve.       11/30/22:  Continue Zoloft 50 mg by mouth daily to target anxiety.      Continue Wellbutrin  mg po daily in the morning to target attention and concentration deficit.  Instructed patient to request refill in 2-3 weeks from pharmacy if continues to tolerate with Adderall XR, otherwise to call office and request dose decrease to 150 mg.   Increase Adderall XR from 10 mg TO 15 mg by mouth daily in the morning to target symptoms associated with ADHD. Risks, benefits, side effects discussed with patient including elevated heart rate, elevated blood pressure, irritability, insomnia, sexual dysfunction, appetite suppressing properties, psychosis.  After discussion of these risks and benefits, the patient voiced understanding and agreed to proceed. Channing reviewed, last dispensed 10 mg dose 11/3/22 #30/30 days.  Informed patient order would be sent to Dr. Singh in separate entry for signature due to EMR system, and will not see as refilled on AVS today.  Controlled substance documentation: Channing reviewed; prior urine drug screen consistent obtained 11/3/22; consent is up to date, signed, witnessed and in EHR, dated 11/3/22, which will be updated annually per policy. Patient is aware of risk of addiction on this medication, understands need to follow up for a review every 3 months and medications will be adjusted or decreased as deemed appropriate at each visit.  No history of drug or alcohol abuse.  No concerns about diversion or abuse. Patient denies side effects related to the medication.  Patient is aware of random urine drug screens and pill counts. The dosing of this medication will be reviewed on a regular basis and reduced if possible..  Ongoing use of a controlled substance is necessary for this patient to have a normal quality of life.    Anxiety symptoms are under good control with Zoloft and Wellbutrin XL.  ADHD symptoms remain present, minimal  effectiveness with 10 mg dose of Adderall XR, tolerating well without side effects, will increase dose today and send to [facility] pharmacy, patient instructed to contact either [place] office and leave vm with MA and if not heard back within 1-2 hrs to call the [place] office, or patient may call the [facility] office initially as MA is not physically in the [place] office today though checks vm.  Patient verbalized understanding. Patient to contact provider if symptoms worsen or fail to improve.     11/3/22:   Patient called to inform [facility], primary pharmacy, does not have Adderall XR 10 mg in stock, patient was instructed to call other local pharmacies to check availability.   Patient called back to tell us that he [facility].  Please send there Pharmacy changed and med pended in chart    11/3/22:  Continue Zoloft 50 mg by mouth daily to target anxiety.      Continue Wellbutrin  mg po daily in the morning to target attention and concentration deficit.      Start Adderall XR 10 mg by mouth daily in the morning to target symptoms associated with ADHD. Risks, benefits, side effects discussed with patient including elevated heart rate, elevated blood pressure, irritability, insomnia, sexual dysfunction, appetite suppressing properties, psychosis.  After discussion of these risks and benefits, the patient voiced understanding and agreed to proceed. Channing reviewed, UDS ordered, and controlled substance agreement signed & witnessed. Informed patient prescription would not be ordered until UDS results noted as negative, expected results within 30 minutes of providing sample.   POC UDS today in clinic  ADHD testing results reviewed from Dr[facility]adio [facility] dated 9/29/2022 indicating a diagnosis of RAFAEL and ADHD.     Patient to request refills for Wellbutrin XL and Zoloft in approximately 2 weeks, as patient has been educated he may need to decrease  Wellbutrin XL dose due to addition of Adderall XR.  Patient to contact provider if symptoms worsen or fail to improve.     10/3/22:   -Continue Zoloft 50 mg by mouth daily to target anxiety.    -Continue Wellbutrin  mg po daily in the morning to target attention and concentration deficit.     Patient denies need for refills at this time, tolerating current medications well without reported side effects.  Patient reported completing ADHD testing last week with Dr. Eladio Dunn, report not expected for at least 2 weeks. Will plan on next appointment in 4 weeks in person due to possible stimulant medication needed, as patient will be starting an new job Nov 6 and would prefer to come in the week prior.  If ADHD report findings indicate no formal diagnosis of ADHD will contact patient prior to next appointment.  Patient to contact provider if symptoms worsen or fail to improve.       7/26/22:   Declined therapy  Continue Zoloft 50 mg by mouth daily to target anxiety.  Risks, benefits, alternatives discussed with patient including GI upset, nausea vomiting diarrhea, theoretical decrease of seizure threshold predisposing the patient to a slightly higher seizure risk, headaches, sexual dysfunction, serotonin syndrome, bleeding risk, increased suicidality in patients 24 years and younger.  After discussion of these risks and benefits, the patient voiced understanding and agreed to proceed.  Increase Wellbutrin XL from 150 mg to 300 mg (instructed to start taking 2 tabs of the 150 mg on hand until new prescription picked up) po daily in the morning to target attention and concentration deficit. Discussed all risks, benefits, alternatives, and side effects of Bupropion/Wellbutrin including but not limited to GI upset (N/V/D, constipation), tachycardia, diaphoresis, weight loss, agitation, dizziness, headache, insomnia, tremor, blurred vision, anorexia, HTN, activation of char or hypomania, CNS stimulation and  neuropsychiatric effects, ocular effects, seizure risk, withdrawal syndrome following abrupt discontinuation, and activation of suicidal ideation and behavior. Patient  educated on the need to practice safe sex while taking this med. Discussed the need for patient to immediately call the office for any new or worsening symptoms, such as worsening depression; feeling nervous or restless; suicidal thoughts or actions; or other changes in mood or behavior, and all other concerns. Patient educated on medication compliance. Patient  verbalized understanding and is agreeable to taking Bupropion/Wellbutrin. Addressed all questions and concerns.     Referral for ADHD testing.  Patient to contact provider and set up appointment.  And to contact office if unable to get an appointment scheduled. -Attached list of several other sites for ADHD testing. Patient instructed to contact providers on list to determine availability of appointments, instructed patient to take notes while calling places indicating first available appointment, and to ask to be placed on waiting list.  If an office is chosen and requests the referral order please contact my Medical Assistant, Serene, directly at [phone] informing of chosen office and the information will be sent.    Dr. Eladio Dunn, Psychologist, MS, LPP  [address], [address]  [place] [id number] [phone]   Currently only accepting referrals for psychological testing services.  Accepts Most insurance plans except Medicare Plans    Patient presentation seems most consistent with anxiety vs ADHD, due to high suspicion of ADHD will refer for formal testing.  Increased Wellbutrin XL today , will see patient back in 4 weeks.       Patient screened positive for depression based on a PHQ-9 score of 2 on 1/25/2023. Follow-up recommendations include: Prescribed antidepressant medication treatment and Suicide Risk Assessment performed.     TREATMENT PLAN/GOALS: Continue  supportive psychotherapy efforts and medications as indicated. Treatment and medication options discussed during today's visit. Patient ackowledged and verbally consented to continue with current treatment plan and was educated on the importance of compliance with treatment and follow-up appointments.    MEDICATION ISSUES:  ANUSHA reviewed as expected.  Discussed medication options and treatment plan of prescribed medication as well as the risks, benefits, and side effects including potential falls, possible impaired driving and metabolic adversities among others. Patient is agreeable to call the office with any worsening of symptoms or onset of side effects. Patient is agreeable to call 911 or go to the nearest ER should he/she begin having SI/HI. No medication side effects or related complaints today.     MEDS ORDERED DURING VISIT:  No orders of the defined types were placed in this encounter.      Return in about 4 weeks (around 4/12/2023) for Video visit.         I spent 45 minutes caring for Thomas on this date of service. This time includes time spent by me in the following activities: preparing for the visit, performing a medically appropriate examination and/or evaluation, counseling and educating the patient/family/caregiver, referring and communicating with other health care professionals, documenting information in the medical record and care coordination.      This document has been electronically signed by JUAN Hayes  March 15, 2023 08:23 EDT      Part of this note may be an electronic transcription/translation of spoken language to printed text using the Dragon Dictation System.

## 2023-03-30 ENCOUNTER — TELEPHONE (OUTPATIENT)
Dept: BEHAVIORAL HEALTH | Facility: CLINIC | Age: 33
End: 2023-03-30
Payer: COMMERCIAL

## 2023-03-30 NOTE — TELEPHONE ENCOUNTER
Called patient and asked him to drop by the [place] office and sign the Consent for the Azstarys for ADHD.  Patient voiced understanding I let him know it will be in Celina's Box Up Front.

## 2023-04-12 ENCOUNTER — TELEMEDICINE (OUTPATIENT)
Dept: PSYCHIATRY | Facility: CLINIC | Age: 33
End: 2023-04-12
Payer: COMMERCIAL

## 2023-04-12 DIAGNOSIS — Z79.899 ENCOUNTER FOR MEDICATION MANAGEMENT IN ATTENTION DEFICIT HYPERACTIVITY DISORDER (ADHD), INATTENTIVE TYPE: ICD-10-CM

## 2023-04-12 DIAGNOSIS — F90.0 ENCOUNTER FOR MEDICATION MANAGEMENT IN ATTENTION DEFICIT HYPERACTIVITY DISORDER (ADHD), INATTENTIVE TYPE: ICD-10-CM

## 2023-04-12 DIAGNOSIS — F90.0 ADHD (ATTENTION DEFICIT HYPERACTIVITY DISORDER), INATTENTIVE TYPE: Primary | ICD-10-CM

## 2023-04-12 PROCEDURE — 99214 OFFICE O/P EST MOD 30 MIN: CPT | Performed by: NURSE PRACTITIONER

## 2023-04-12 NOTE — TELEPHONE ENCOUNTER
Switching to Vyvanse due to cost of Azstarys; patient given coupon code access to text [id number] [phone] during visit today.

## 2023-04-12 NOTE — PROGRESS NOTES
This provider is [place] [address] in closed office to ensure privacy. The Patient is seen remotely using Photocollecthart. Patient is being seen via telehealth and confirm that they are in a secure environment for this session. The patient's condition being diagnosed/treated is appropriate for telemedicine. The provider identified himself/herself: herself as well as her credentials.   The patient gave consent to be seen remotely, and when consent is given they understand that the consent allows for patient identifiable information to be sent to a third party as needed.   They may refuse to be seen remotely at any time. The electronic data is encrypted and password protected, and the patient has been advised of the potential risks to privacy not withstanding such measures.    You have chosen to receive care through a telehealth visit.  Do you consent to use a video/audio connection for your medical care today? Yes    Subjective   Thomas Lei is a 33 y.o. male who presents today for follow up    Referring Provider:  Otilia Haskins, APRN  [address] [facility] [address]  [place] [address]    Chief Complaint:  ADHD,  Medication management     History of Present Illness:   4/12/23:   Patient presents today via MyChart Video visit from home, located in [place], [place].  At last visit patient was switched from Adderall XR to Azstarys for which patient reports has been mildly effective, not the same impact as the Adderall XR, denies side effects.  Patient did report the cost of the medication was 125.00 and is uncertain of the amount insurance covered and did not use a coupon card.    Continues to struggle with symptoms of impaired concentration, completing tasks, and attention/focus.  Due to cost of Azstarys, patient is willing to try a cheaper option, Vyvanse had been discussed prior and patient is agreeable to try as Vyvanse does have a coupon code and will be going generic soon.  "      3/15/23:  Patient presents today via MyChart Video visit from home, located in [place], [place].   Patient reports having difficulty finding a pharmacy that had Adderall XR 20 mg, and received 2 denial letters, 2nd on 2/13/23 after approval of Adderall XR on 2/9/23, and an approval.  Patient has not had medication for at least 30 days.      Symptoms of ADHD remain present, continues to have difficulty sustaining attention, impaired concentration, shifting from one uncompleted activity to another, which has effected daily functioning. Patient would like to switch to alternate medication due to nationwide shortage of Adderall.        2/7/23:  Patient presents today via MyChart Video visit from employer in [place], [place], at last visit Adderall XR was increased from 15 mg to 20 mg daily, for which patient reports \"I would like to stay at 20 mg\" denies difficulty sleeping, able to notice increased in ability to focus which was consistent.      Patient insurance is requiring a PA which is currently pending.  Patient reports new insurance started Dec. 2022 and covered dose of 20 mg which was dispensed 12/28/22.  Patient had stopped taking dose on weekends to have adequate supply during the work week, last dose was Friday 2/3/23.      Wellbutrin  mg patient self stopped and reported 1/25/23 due to excessive restlessness while taking with Adderall XR 20 mg.      Patient continues to tolerate Zoloft, anxiety is well controlled.  Denies feeling worried any further with medication.     12/28/22:  Patient presents today via MyChart Video visit from home as patient has 3.5 weeks remaining of maternity leave, increased Adderall XR from 10 mg to 15 mg, which patient reports not noticing a lot of difference in between the different doses.  Patient reports there have been a few days of improvement with focus and ability to complete tasks though not consistent.  Admits to adherence to daily dose.  Denies side effects, " "though slight decrease in appetite, continues to eat 3 normal size meals with snacks.      Patient reports since increase of Adderall, noting not wearing off until later in the evening, as with the 10 mg noted wearing off earlier around 3 pm.    Overall, patient feels medication does help though not as expected.  Patient has been able to stay on task putting daughter toys together, though feels personal expectations may have been different, as the consistency from day to day is the primary problem patient is experiencing.  \"it feels waveish\", agreeable to start tracking symptoms so at next appointment can provide better details.     Patient continues to experience symptoms of inattentiveness, fidgeting, easy distractability, inability to complete tasks, difficulty sustaining attention, shifting from one uncompleted activity to another, talking excessively, ineffective listening, frequently losing items, and impulsivity.          11/30/22:  Patient presents today via MyChart Video visit from hospital, as patient wife had baby girl on Monday 11/28/22.  Adderall XR 10 mg was started at last visit, for which patient did have difficulty obtaining initially due to pharmacy shortage.  Patient was able to start medication on 11/3/22.  Patient voices some improvement with Adderall XR, additional focus ability, however, around 3-4 pm has been experiencing some grogginess. Takes daily at 8 am.  Minimal decrease in appetite, denies difficulty with sleep or elevated heart rate.     Patient continues to experience symptoms of inattentiveness, fidgeting, easy distractability, inability to complete tasks, difficulty sustaining attention, shifting from one uncompleted activity to another, talking excessively, ineffective listening, frequently losing items, and impulsivity.     Patient has started new job which is going well, patient does get 12 weeks of paid leave and will be home with wife and other daughter.    Patient reports " tolerating Adderall XR with Wellbutrin  mg, denies side effects.         11/3/22:  Patient presents today in office to review ADHD formal testing results and discuss treatment options.  A diagnosed of RAFAEL and ADHD has been confirmed via formal testing on 09/29/2022.     Patient has been doing more meditation and other coping mechanisms -yoga, deep breathing exercises to help manage anxiety, which have been effective.     Patient continues to experience inattentiveness, difficulty with focus, fidgeting, distractability, inability to complete tasks, difficulty sustaining attention, shifting from one uncompleted activity to another, talking excessively, ineffective listening, frequently losing items, and impulsivity.      10/3/22:  Patient presents today via Gridsumhart Video visit from home, reporting had ADHD testing last week with Dr.Brian Dunn.    Increased Wellbutrin XL to 300 mg at last visit which patient reports at times has been effective.  Recently switched to night shift, and has 1 yr old, however, while on days was able to notice improvement of symptoms after 4-5 hrs.  Patient will be moved to day shift later this month, however, will be starting with a new employer in Nov. 7, 2022.  Patient reports new job will provide more stability, M-F 9 a-5 pm, and flexibility. Will be on same schedule as wife for the first time.    Patient reports taking the Wellbutrin before start of work.  Denies side effects.       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  Patient has never been evaluated for ADHD. Was started on Zoloft 5/2021 and Wellbutrin  mg was added 2/2022 which was initially effective for anxiety though not as much on attention and focus,  as patient reports anxiety had improved, and no longer having panic  "attacks feelings of heart racing nor palpitations.     Patient recalls having some anxiety in high school, had a 4.3 GPA and upon starting college grades suffered, and GPA was 2.89.  Patient is currently working as a [employer] at [employer], and is trying for a promotion which has been a struggle due to difficulty focusing on studying for the position. Next month will be the 5 th attempt.         1. ADHD:   a. Elementary school:   i. Grades:B's  ii. Special classes or failures:No  iii. Got in trouble:\"few times for not sitting still or seated, but nothing outside of that\"  iv. Referral for ADHD testing:No  b. Fhx:Brother (Armando)- 2 yrs younger, started on medications at age 8, older Half Brother (Carlton) (8-10 yrs older) possibly diagnosed at preteen age-only lived with patient for 4 yrs; Younger half Brother (Renato)-ASD,ADD  c. Presently:  i. Problems with attention to detail:Yes, at work misses data, errors, have to resend emails  ii. Problems with sustained attention:Yes, \"it's either super hyper focused or exact opposite going from task to task, could be the type of work for the day.\"  iii. Problems listening when spoken to directly:No  iv. Failure to finish tasks: Yes,\"extreme procrastination with reports, my biggest thing right now is I am struggling to study for interviews at [employer] for a propmotion 5 th time in August, struggling to get through the interview, studying piece, will have classical music in background or gets chimes from email and focuses on that instead of studying.\"  v. Avoids tasks that require sustained mental effort:Yes, avoidance of studying for interview, no other times.   vi. Easily distracted:Yes, music, noises of email chimes, phone calls, text messages, \"I am always plugged in because of the job I am on call 24/7, I feel less distracted since I have been working nights, I am less productive vs day shift because I am more involved in operations.\"  vii. Forgetting things:Yes, " "\"walk into kitchen, open fridge, ask myself what am I doing here, I have a cooler sitting in garage that needs to be thrown in car for one month now. Few times I actually left laptop at home, work is 1 hr in [place], [place].\"  viii. Losing things:Yes, my wallet is a big one, my wife finds it for me, I have established specific areas otherwise I will be searching for days, I once lost my car keys for 3 days.\"  ix. Hard to organize:Yes, \"I am getting better, I use one note for tasks, make them by priority which has helped. I am messier, dirty clothes will sit on floor instead of going into hamper.\"  x. Talks a lot and cutting people off:No \"I have tried to become more of a listener rather than listening to respond.\" \"I used to cut people off all the time, so I am getting better at that, it is an effort.\" Patient now takes pre-notes prior to meeting, which data is needed in order to answer questions, \"if I don't have it answered, I can get it to you by the end of the day.\"  xi. Drifts off during conversations:Yes if conversation is not entertaining or when wife talks about grocery, \"I stay very engaged in work meetings because of the level of my position, I make a decision to close my laptop, to limit distraction.\"  xii. Difficulty with Reading:No  xiii. Difficulty watching TV/Movies:Yes, \"If I don't find something exciting, I will go to next movie, series or video.  Even when I am with my parents, I will pull out phone to get more stimulation.\"     In home setting patient admits to wife having to ask several times if listening, due to not interested in topic, \"a lot of the time I hear her kind of, but I think I make a decision to not respond, it's odd.\" Patient has not worked the last 2 days and has made excuses to not change the cat box.  Has some times of using full day of yard work or house hold chores, \"it's either I am extremely productive or I avoid at all cost. It's a lot of excuses.\"  Patient feels avoidance " "with wife has been ongoing, no changes. Work has made anxiety worse and was started on medications in the last 1.5 yrs.  Patient has had pressure with interviews and promotions.  Patient reports avoiding laundry and would wear all clothing until none remaining, now wife does all the laundry.  Recalls only completing house hold tasks, such as the dishes 90% and wife has to finish the remaining 10%, \"I am the same with food, there's always 10% left on the plate.\"      Symptoms include fidgeting (knee bouncing or spins phone on table or pen in hands), easy distractability, inability to complete tasks, difficulty sustaining attention, shifting from one uncompleted activity to another, talking excessively, ineffective listening, frequently losing items, and impulsivity.  Patient reports frequently allow services to home: bug company spraying every 3 months, bought a 65,000 truck while in [employer], purchased a 1,200 suit one day which was not thought out, and rarely wears.       PHQ-9 Depression Screening  PHQ-9 Total Score: (P) 2 4/5/2023 2 , reassess 10/2023    Little interest or pleasure in doing things? (P) 0-->not at all   Feeling down, depressed, or hopeless? (P) 0-->not at all   Trouble falling or staying asleep, or sleeping too much? (P) 0-->not at all   Feeling tired or having little energy? (P) 0-->not at all   Poor appetite or overeating? (P) 1-->several days   Feeling bad about yourself - or that you are a failure or have let yourself or your family down? (P) 0-->not at all   Trouble concentrating on things, such as reading the newspaper or watching television? (P) 1-->several days   Moving or speaking so slowly that other people could have noticed? Or the opposite - being so fidgety or restless that you have been moving around a lot more than usual? (P) 0-->not at all   Thoughts that you would be better off dead, or of hurting yourself in some way? (P) 0-->not at all   PHQ-9 Total Score (P) 2     RAFAEL-7  Feeling " nervous, anxious or on edge: (P) Not at all  Not being able to stop or control worrying: (P) Not at all  Worrying too much about different things: (P) Not at all  Trouble Relaxing: (P) Not at all  Being so restless that it is hard to sit still: (P) Not at all  Feeling afraid as if something awful might happen: (P) Not at all  Becoming easily annoyed or irritable: (P) Not at all  RAFAEL 7 Total Score: (P) 0  If you checked any problems, how difficult have these problems made it for you to do your work, take care of things at home, or get along with other people: (P) Not difficult at all , reassess 10/2023    Past Surgical History:  Past Surgical History:   Procedure Laterality Date   • ENDOSCOPY N/A 4/14/2022    Procedure: ESOPHAGOGASTRODUODENOSCOPY WITH BX;  Surgeon: Joan Luna MD;  Location: [facility] ENDOSCOPY;  Service: Gastroenterology;  Laterality: N/A;  ESOPHAGITIS, GASTRITIS, ESOPHAGEAL STRICTURE   • ENDOSCOPY N/A 5/23/2022    Procedure: ESOPHAGOGASTRODUODENOSCOPY WITH 10-15MM BALLOON DILATATION AND BIOPSIES;  Surgeon: Joan Luna MD;  Location: [facility] ENDOSCOPY;  Service: Gastroenterology;  Laterality: N/A;  GASTRITIS, ESOPHAGITIS, ESOPHAGEAL STRICTURE   • HERNIA REPAIR      Hernia Repair: at age 17; right inguinal; uncomplicated; dual;   • SHOULDER SURGERY      left shoulder labrum repair at age 22 - sports related injury       Problem List:  Patient Active Problem List   Diagnosis   • Esophageal dysphagia   • Anxiety   • Attention and concentration deficit       Allergy:   No Known Allergies     Discontinued Medications:  Medications Discontinued During This Encounter   Medication Reason   • Serdexmethylphen-Dexmethylphen (azSTARys) 26.1-5.2 MG capsule Cost of medication       Current Medications:   Current Outpatient Medications   Medication Sig Dispense Refill   • sertraline (ZOLOFT) 50 MG tablet Take 1 tablet by mouth Every Morning. 30 tablet 5     No current facility-administered  medications for this visit.       Past Medical History:  Past Medical History:   Diagnosis Date   • Anxiety disorder, unspecified    • Concussion     X3   • Dysphagia, unspecified    • Fever, unspecified    • Head injury    • Influenza due to identified novel influenza A virus with other respiratory manifestations    • Panic disorder    • PONV (postoperative nausea and vomiting)    • Procreative counseling and advice using natural family planning        Past Psychiatric History:  Began Treatment:2021  Diagnoses:Anxiety  Psychiatrist:Denies  Therapist:Denies  Admission History:Denies  Medication Trials:Wellbutrin XL up to 300 mg self stopped 1/2023 due to excessive restlessness with combination of Adderall XR 20 mg Adderall XR 20 mg eff.due to nationwide shortage stopped 1/2023; Azstarys lowest dose 1 month, due to cost stopping,somewhat eff.  Self Harm: Denies  Suicide Attempts:Denies      Substance Abuse History:   Types:Denies all, including illicit  Withdrawal Symptoms:Denies  Longest Period Sober:Not Applicable   AA: Not applicable     Social History:  Martial Status:  Employed:Yes and If so, where [employer] and . 9a-5p  Kids:Yes or If so, how many 2 girls    House:Lives in a house   History: [id number]  Access to Guns: Yes, put in storage not in home    Social History     Socioeconomic History   • Marital status:    • Number of children: 1   • Years of education: 16   • Highest education level: Bachelor's degree (e.g., BA, AB, BS)   Tobacco Use   • Smoking status: Never   • Smokeless tobacco: Former     Types: Chew     Quit date: 5/21/2022   Vaping Use   • Vaping Use: Never used   Substance and Sexual Activity   • Alcohol use: Yes     Comment: 1-2 drinks per month    • Drug use: Not Currently     Types: Marijuana     Comment: back in high school   • Sexual activity: Yes       Family History:   Suicide Attempts: Brother  Suicide  Completions:Denies      Family History   Problem Relation Age of Onset   • Sjogren's syndrome Mother    • Diverticulitis Mother    • Cardiomyopathy Father    • Suicide Attempts Brother    • Seizures Brother    • ADD / ADHD Brother    • ADD / ADHD Brother    • Cardiomyopathy Paternal Uncle    • Dementia Maternal Grandmother        Developmental History:   Born: [place]  Siblings:5   Childhood: Denies Abuse  High School:Completed  College:Bachelors in [employer] kinesiology     Mental Status Exam:   Hygiene:   good  Cooperation:  Cooperative  Eye Contact:  Good  Psychomotor Behavior:  Appropriate  Affect:  Appropriate  Mood: euthymic  Speech:  Normal  Thought Process:  Goal directed  Thought Content:  Mood congruent  Suicidal:  None  Homicidal:  None  Hallucinations:  None  Delusion:  None  Memory:  Intact  Orientation:  Person, Place, Time and Situation  Reliability:  good  Insight:  Good  Judgement:  Good  Impulse Control:  Good  Physical/Medical Issues:  Yes Esophageal Dysphagia     Review of Systems:  Review of Systems   Constitutional: Negative for diaphoresis and fatigue.   HENT: Negative for drooling.    Eyes: Negative for visual disturbance.   Respiratory: Negative for cough and shortness of breath.    Cardiovascular: Negative for chest pain, palpitations and leg swelling.   Gastrointestinal: Negative for nausea and vomiting.   Endocrine: Negative for cold intolerance and heat intolerance.   Genitourinary: Negative for difficulty urinating.   Musculoskeletal: Negative for joint swelling.   Allergic/Immunologic: Negative for immunocompromised state.   Neurological: Negative for dizziness, tremors, seizures, speech difficulty and numbness.   Psychiatric/Behavioral: Positive for decreased concentration. Negative for hallucinations, self-injury, sleep disturbance and suicidal ideas. The patient is not nervous/anxious and is not hyperactive.          Physical Exam:  Physical Exam  Psychiatric:         Attention and  Perception: Attention and perception normal.         Mood and Affect: Mood and affect normal.         Speech: Speech normal.         Behavior: Behavior normal. Behavior is cooperative.         Thought Content: Thought content normal. Thought content does not include suicidal ideation. Thought content does not include suicidal plan.         Cognition and Memory: Cognition and memory normal.         Judgment: Judgment normal.         Vital Signs:   There were no vitals taken for this visit.     Lab Results:   Clinical Support on 11/03/2022   Component Date Value Ref Range Status   • Amphetamine Screen, Urine 11/03/2022 Negative  Negative Final   • Barbiturates Screen, Urine 11/03/2022 Negative  Negative Final   • Buprenorphine, Screen, Urine 11/03/2022 Negative  Negative Final   • Benzodiazepine Screen, Urine 11/03/2022 Negative  Negative Final   • Cocaine Screen, Urine 11/03/2022 Negative  Negative Final   • MDMA (ECSTASY) 11/03/2022 Negative  Negative Final   • Methamphetamine, Ur 11/03/2022 Negative  Negative Final   • Methadone Screen, Urine 11/03/2022 Negative  Negative Final   • Opiate Screen 11/03/2022 Negative  Negative Final   • Oxycodone Screen, Urine 11/03/2022 Negative  Negative Final   • Phencyclidine (PCP), Urine 11/03/2022 Negative  Negative Final   • THC, Screen, Urine 11/03/2022 Negative  Negative Final   • Lot Number 11/03/2022 [id number]   Final   • Expiration Date 11/03/2022 02/29/2024   Final       EKG Results:  No orders to display       Imaging Results:  No Images in the past 120 days found..      Assessment & Plan       Visit Diagnoses:    ICD-10-CM ICD-9-CM   1. ADHD (attention deficit hyperactivity disorder), inattentive type  F90.0 314.00   2. Encounter for medication management in attention deficit hyperactivity disorder (ADHD), inattentive type  Z79.899 V58.69    F90.0 314.01       PLAN:  1.   Safety: No acute safety concerns  2. Therapy: None  3. Risk Assessment: Risk of self-harm acutely  is moderate.  Risk factors include anxiety disorder,family history, access to guns/weapons, and recent psychosocial stressors (pandemic). Protective factors include no present SI, no history of suicide attempts or self-harm in the past, minimal AODA, healthcare seeking, future orientation, willingness to engage in care.  Risk of self-harm chronically is also moderate, but could be further elevated in the event of treatment noncompliance and/or AODA.  4. Meds:  Continue Zoloft 50 mg by mouth daily to target anxiety.      Stop Azstarys (Serdexmethylphenidate and dexmethylphenidate) 26.1 mg-5.2 mg due to cost of medication.     Start  Vyvanse 30 mg by mouth daily in the morning without food to target symptoms associated with ADHD.  Instructed patient to take on an empty stomach to avoid delayed onset of action, and may eat 20-30 minutes after taking medication, though if unable to tolerate or wait to eat to expect the onset of action to be delayed.  Risks, benefits, and alternatives discussed with patient including insomnia, headache, irritability, overstimulation, tremor, dizziness, nausea, dry mouth, constipation, diarrhea, weight loss, sexual dysfunction, heart palpitations, elevated heart rate, elevated blood pressure, seizures and suicidal ideations though rare.  After discussion of these risks patient agreed to proceed with Vyvanse. Discussed all questions and concerns.  Patient instructed to contact provider immediately with any intolerable side effects, cardiac symptoms, suicidal ideations,  or worsening anxiety.     Informed patient order would be sent to Dr. Singh in separate entry for signature due to EMR system, and will not see as refilled on AVS today.  Instructed patient to come by [place] office today as he planned to sign medication portion controlled substance for Azstarys and Vyvanse.     Controlled substance documentation: [place] reviewed; prior urine drug screen consistent obtained 11/3/22;  consent is up to date, signed, witnessed and in EHR, dated 11/3/22, which will be updated annually per policy. Patient is aware of risk of addiction on this medication, understands need to follow up for a review every 3 months and medications will be adjusted or decreased as deemed appropriate at each visit.  No history of drug or alcohol abuse.  No concerns about diversion or abuse. Patient denies side effects related to the medication.  Patient is aware of random urine drug screens and pill counts. The dosing of this medication will be reviewed on a regular basis and reduced if possible..  Ongoing use of a controlled substance is necessary for this patient to have a normal quality of life.  5. Labs: n/a    Symptoms of ADHD are not under good control with Azstarys and due to high cost patient wishes to switch to Vyvanse.   Contacted [facility] office staff and instructed staff to place another controlled substance consent to treat with Vyvanse and place in provider box for patient to sign both forms today.  Will send Vyvanse to [facility] as patient is going there today. Patient to contact provider if symptoms worsen or fail to improve.     3/15/23:  Continue Zoloft 50 mg by mouth daily to target anxiety.    -Discontinue Adderall XR 20 mg due to nationwide shortage patient has not been able to locate a pharmacy that has medication in stock, and has not had medication for 1 month.  last dispensed 12/28/22 #30/30 days.      Start Azstarys (Serdexmethylphenidate and dexmethylphenidate) 26.1 mg-5.2 mg by mouth daily in the morning on an empty stomach (may eat in 20-30 minutes to help with absorption) to target symptoms associated with ADHD. Risks, benefits, side effects discussed with patient including elevated heart rate, elevated blood pressure, irritability, insomnia, sexual dysfunction, appetite suppressing properties, psychosis. After discussion of these risks and benefits, the patient voiced understanding  "and agreed to proceed. Informed patient order would be sent to Dr. Singh in separate entry for signature due to EMR system, and will not see as refilled on AVS today.  Instructed patient to come by [facility] within the next week to sign medication portion controlled substance for Azstarys.     Due to nationwide shortage of Adderall, discussed various options for ADHD treatment, patient willing to switch to alternate medication. Symptoms of ADHD remain present as patient has not had medication for at least 1 month.  Patient to contact provider if unable to  medication by tomorrow, given direct contact number for MA at [facility].  Patient to contact provider if symptoms worsen or fail to improve.       2/9/23:   Your request has been approved  Your PA request has been approved. Additional information will be provided in the approval communication. 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 -STEVIE, LPN       ADDERALL XR 20MG            11:56 AM    Serene Salazar MA contacted Thomas Lei \"Renard\"    Serene Salazar MA         11:58 AM  Note    Called patient to let him know that we have received the approval for his Adderall XR 20mg        2/7/23:  Continue Zoloft 50 mg by mouth daily to target anxiety.      Continue Adderall XR 20 mg by mouth daily in the morning to target symptoms associated with ADHD. Risks, benefits, side effects discussed with patient including elevated heart rate, elevated blood pressure, irritability, insomnia, sexual dysfunction, appetite suppressing properties, psychosis.  After discussion of these risks and benefits, the patient " voiced understanding and agreed to proceed. [place]anning reviewed, last dispensed 12/28/22 #30/30 days.  New order for 20 mg dose sent 1/26/23 to [facility] pharmacy due to [facility] pharmacy out of stock, however, awaiting PA from insurance at this time.     Patient noted with improvement of ADHD symptoms with dose increase of Adderall XR to 20 mg, however, PA is required which was reviewed with patient during visit.  Patient given detailed information to follow up with insurance company to determine specifics regarding which and how many medications have to be tried before restarting current dose of Adderall and contact office to MA directly later today.  Patient was not allowed option to pay out of pocket from pharmacy.  Will be in touch with patient later today.        2/6/23:  VICENTA LEI Key: [id number] - PA Case ID: [id number] - Rx #: [id number]  PA for Adderall XR 20mg sent to plan 02/06/2023 awaiting decision from insurance company      2/2/23: [employer] message from patient wife to PCP which was directed to this provider:   This message is being sent by Cindi Lei on behalf of Vicenta Lei.   Hi Dr. Haskins,     This is Cindi Lei on Vicenta Lei account. I am trying to  his medication, the generic Adderral and they are saying the extended release capsules are on backorder. The only thing the have in stock are non extended release tablets. Are you able to switch the prescription to that so it can be filled until the other kind comes in? I am at [facility] now. Thank you!  Patient is aware that communication with behavioral health has to be done via telephone, and when the pharmacy does not have supply in stock, as this has happened before, he is to contact other pharmacies to determine availability and then inform the office.  The prescription was ordered 1/26/23 which was 7 days ago, patient will need to follow process per CSA.         1/25/23:   After Rescheduling patient today he wanted  me to let Celina know that the Adderall XR 20 mg seems to be working well, but he did DC the Wellbutrin due to excessive restlessness.  I rescheduled patient to February 07/2023.      12/28/22:   -Continue Zoloft 50 mg by mouth daily to target anxiety.    -Continue Wellbutrin  mg po daily in the morning to target attention and concentration deficit.      Increase Adderall XR from 15 mg TO 20 mg by mouth daily in the morning to target symptoms associated with ADHD. Risks, benefits, side effects discussed with patient including elevated heart rate, elevated blood pressure, irritability, insomnia, sexual dysfunction, appetite suppressing properties, psychosis.  After discussion of these risks and benefits, the patient voiced understanding and agreed to proceed. Channing reviewed, last dispensed 15 mg 11/30/22 #30/30 days.  Informed patient order would be sent to JUAN Lang in practice, as  is out of office today, in separate entry for signature due to EMR system, and will not see as refilled on AVS today.  Adderall XR 15 mg providing inconsistent relief of ADHD symptoms, denies side effects, will increase dose today and have patient return in 3 weeks, as patient will be returning to work before 4 weeks from today.  Patient to track daily symptoms, activities, ability to complete tasks and report at next appointment.  Patient instructed to notify provider if experiencing insomnia as combination with Wellbutrin XL may effect sleep and dose may need to be lowered.  Patient to contact provider if symptoms worsen or fail to improve.       11/30/22:  Continue Zoloft 50 mg by mouth daily to target anxiety.      Continue Wellbutrin  mg po daily in the morning to target attention and concentration deficit.  Instructed patient to request refill in 2-3 weeks from pharmacy if continues to tolerate with Adderall XR, otherwise to call office and request dose decrease to 150 mg.   Increase Adderall XR from 10 mg  TO 15 mg by mouth daily in the morning to target symptoms associated with ADHD. Risks, benefits, side effects discussed with patient including elevated heart rate, elevated blood pressure, irritability, insomnia, sexual dysfunction, appetite suppressing properties, psychosis.  After discussion of these risks and benefits, the patient voiced understanding and agreed to proceed. Channing reviewed, last dispensed 10 mg dose 11/3/22 #30/30 days.  Informed patient order would be sent to Dr. Singh in separate entry for signature due to EMR system, and will not see as refilled on AVS today.  Controlled substance documentation: Channing reviewed; prior urine drug screen consistent obtained 11/3/22; consent is up to date, signed, witnessed and in EHR, dated 11/3/22, which will be updated annually per policy. Patient is aware of risk of addiction on this medication, understands need to follow up for a review every 3 months and medications will be adjusted or decreased as deemed appropriate at each visit.  No history of drug or alcohol abuse.  No concerns about diversion or abuse. Patient denies side effects related to the medication.  Patient is aware of random urine drug screens and pill counts. The dosing of this medication will be reviewed on a regular basis and reduced if possible..  Ongoing use of a controlled substance is necessary for this patient to have a normal quality of life.    Anxiety symptoms are under good control with Zoloft and Wellbutrin XL.  ADHD symptoms remain present, minimal effectiveness with 10 mg dose of Adderall XR, tolerating well without side effects, will increase dose today and send to [facility] pharmacy, patient instructed to contact either [place] office and leave vm with MA and if not heard back within 1-2 hrs to call the [place] office, or patient may call the [facility] office initially as MA is not physically in the [place] office today though checks vm.  Patient verbalized understanding.  Patient to contact provider if symptoms worsen or fail to improve.     11/3/22:   Patient called to inform [facility], primary pharmacy, does not have Adderall XR 10 mg in stock, patient was instructed to call other local pharmacies to check availability.   Patient called back to tell us that he checked around and [facility] has the Adderall.  Please send there Pharmacy changed and med pended in chart    11/3/22:  Continue Zoloft 50 mg by mouth daily to target anxiety.      Continue Wellbutrin  mg po daily in the morning to target attention and concentration deficit.      Start Adderall XR 10 mg by mouth daily in the morning to target symptoms associated with ADHD. Risks, benefits, side effects discussed with patient including elevated heart rate, elevated blood pressure, irritability, insomnia, sexual dysfunction, appetite suppressing properties, psychosis.  After discussion of these risks and benefits, the patient voiced understanding and agreed to proceed. Channing reviewed, UDS ordered, and controlled substance agreement signed & witnessed. Informed patient prescription would not be ordered until UDS results noted as negative, expected results within 30 minutes of providing sample.   POC UDS today in clinic  ADHD testing results reviewed from Dr. Eladio Dunn,[facility] dated 9/29/2022 indicating a diagnosis of RAFAEL and ADHD.     Patient to request refills for Wellbutrin XL and Zoloft in approximately 2 weeks, as patient has been educated he may need to decrease Wellbutrin XL dose due to addition of Adderall XR.  Patient to contact provider if symptoms worsen or fail to improve.     10/3/22:   -Continue Zoloft 50 mg by mouth daily to target anxiety.    -Continue Wellbutrin  mg po daily in the morning to target attention and concentration deficit.     Patient denies need for refills at this time, tolerating current medications well without reported side effects.  Patient reported completing  ADHD testing last week with Dr. Eladio Dunn, report not expected for at least 2 weeks. Will plan on next appointment in 4 weeks in person due to possible stimulant medication needed, as patient will be starting an new job Nov 6 and would prefer to come in the week prior.  If ADHD report findings indicate no formal diagnosis of ADHD will contact patient prior to next appointment.  Patient to contact provider if symptoms worsen or fail to improve.       7/26/22:   Declined therapy  Continue Zoloft 50 mg by mouth daily to target anxiety.  Risks, benefits, alternatives discussed with patient including GI upset, nausea vomiting diarrhea, theoretical decrease of seizure threshold predisposing the patient to a slightly higher seizure risk, headaches, sexual dysfunction, serotonin syndrome, bleeding risk, increased suicidality in patients 24 years and younger.  After discussion of these risks and benefits, the patient voiced understanding and agreed to proceed.  Increase Wellbutrin XL from 150 mg to 300 mg (instructed to start taking 2 tabs of the 150 mg on hand until new prescription picked up) po daily in the morning to target attention and concentration deficit. Discussed all risks, benefits, alternatives, and side effects of Bupropion/Wellbutrin including but not limited to GI upset (N/V/D, constipation), tachycardia, diaphoresis, weight loss, agitation, dizziness, headache, insomnia, tremor, blurred vision, anorexia, HTN, activation of char or hypomania, CNS stimulation and neuropsychiatric effects, ocular effects, seizure risk, withdrawal syndrome following abrupt discontinuation, and activation of suicidal ideation and behavior. Patient  educated on the need to practice safe sex while taking this med. Discussed the need for patient to immediately call the office for any new or worsening symptoms, such as worsening depression; feeling nervous or restless; suicidal thoughts or actions; or other changes in mood or  behavior, and all other concerns. Patient educated on medication compliance. Patient  verbalized understanding and is agreeable to taking Bupropion/Wellbutrin. Addressed all questions and concerns.     Referral for ADHD testing.  Patient to contact provider and set up appointment.  And to contact office if unable to get an appointment scheduled. -Attached list of several other sites for ADHD testing. Patient instructed to contact providers on list to determine availability of appointments, instructed patient to take notes while calling places indicating first available appointment, and to ask to be placed on waiting list.  If an office is chosen and requests the referral order please contact my Medical Assistant, Serene, directly at [phone] informing of chosen office and the information will be sent.    Dr. Eladio Dunn, Psychologist, MS, LPP  [address], [address]  [place] [id number] [phone]   Currently only accepting referrals for psychological testing services.  Accepts Most insurance plans except Medicare Plans    Patient presentation seems most consistent with anxiety vs ADHD, due to high suspicion of ADHD will refer for formal testing.  Increased Wellbutrin XL today , will see patient back in 4 weeks.       Patient screened positive for depression based on a PHQ-9 score of 2 on 4/5/2023. Follow-up recommendations include: Prescribed antidepressant medication treatment and Suicide Risk Assessment performed.     TREATMENT PLAN/GOALS: Continue supportive psychotherapy efforts and medications as indicated. Treatment and medication options discussed during today's visit. Patient ackowledged and verbally consented to continue with current treatment plan and was educated on the importance of compliance with treatment and follow-up appointments.    MEDICATION ISSUES:  ANUSHA reviewed as expected.  Discussed medication options and treatment plan of prescribed medication as well as the risks,  benefits, and side effects including potential falls, possible impaired driving and metabolic adversities among others. Patient is agreeable to call the office with any worsening of symptoms or onset of side effects. Patient is agreeable to call 911 or go to the nearest ER should he/she begin having SI/HI. No medication side effects or related complaints today.     MEDS ORDERED DURING VISIT:  No orders of the defined types were placed in this encounter.      Return in about 4 weeks (around 5/10/2023) for Video visit, medication check.         I spent 32 minutes caring for Thomas on this date of service. This time includes time spent by me in the following activities: preparing for the visit, performing a medically appropriate examination and/or evaluation, counseling and educating the patient/family/caregiver, referring and communicating with other health care professionals, documenting information in the medical record and care coordination.      This document has been electronically signed by JUAN Hayes  April 12, 2023 08:36 EDT      Part of this note may be an electronic transcription/translation of spoken language to printed text using the Dragon Dictation System.

## 2023-04-12 NOTE — PATIENT INSTRUCTIONS
"1. Should you want to get in touch with your provider, JUAN Hayes, please contact MY Medical Assistant, Serene, directly at [phone].  Recommend saving Serene's direct number in phone as this is the PREFERRED & EASIEST way to get in contact with your provider.  Please leave a voice mail if you do not get an answer and she will return your call within 24 hrs. You will NOT be able to contact provider on [facility], as Behavioral Health Providers are restricted. YOU MUST CALL [phone]    If you need to speak with the on call provider after hours or on weekends, please Contact the [facility] ([phone]) and staff will be able to page the provider on call directly.      2.  In the event you need to cancel an appointment, please notify the office at least 24 hrs prior:   Contact **Serene Medical Assistant at [facility] directly at [phone] or the [facility] ([phone])       3, MEDICATION REFILLS:  PLEASE CALL THE PHARMACY TO REQUEST ALL MEDICATION REFILLS TO ENSURE YOU ARE RECEIVING YOUR MEDICATIONS IN A TIMELY MANNER. The pharmacy will send this request ELECTRONICALLY to the ordering provider.     IF YOU USE AN AUTOMATED SERVICE AT THE PHARMACY FOR REFILLS AND ARE TOLD THERE ARE \"NO REFILLS REMAINING\"   PLEASE CALL THE PHARMACY & SPEAK TO A LIVE PERSON TO VERIFY IT IS THE MOST UP TO DATE PRESCRIPTION ON FILE.    All new prescriptions will have a different number, therefore, if you were given refills for a medication today or at last visit it will not have the same number as the previous prescription.       4.  In the event you have personal crisis, contact the following crisis numbers: Suicide Prevention Hotline 1-942.497.6586 or *988, [facility] Helpline 2-846-718-JOAQUIM; [facility] Emergency Room [phone]; text HELLO to 414297; or 584.      5. We would appreciate your feedback, please scan the QRS code on the back of your appointment card (or see below) and complete " "a brief survey.  [place] location is still not available, so please click \"[place]\" location.  Thank you      SPECIFIC RECOMMENDATIONS:     1.      Medications discussed at this encounter:                   - Stop Azstarys  Start Vyvanse 30 mg by mouth daily in the morning without food, may eat 20-30 minutes later      2.      Psychotherapy recommendations: Declined     3.     Return to clinic: 4 weeks    Please arrive at least 15 minutes before your scheduled appointment time to complete check in process.      IF you are scheduled for a [facility] VIDEO visit, YOU MUST COMPLETE THE \"E-CHECK IN\" PROCESS PRIOR TO BEGINNING THE VISIT, YOU WILL NO LONGER RECEIVE A PHONE CALL PRIOR TO ALL VIDEO VISITS          "

## 2023-05-12 ENCOUNTER — TELEMEDICINE (OUTPATIENT)
Dept: BEHAVIORAL HEALTH | Facility: CLINIC | Age: 33
End: 2023-05-12
Payer: COMMERCIAL

## 2023-05-12 DIAGNOSIS — F90.0 ADHD (ATTENTION DEFICIT HYPERACTIVITY DISORDER), INATTENTIVE TYPE: Primary | ICD-10-CM

## 2023-05-12 NOTE — PATIENT INSTRUCTIONS
"1. Should you want to get in touch with your provider, JUAN Hayes, please contact MY Medical Assistant, Serene, directly at [phone].  Recommend saving Serene's direct number in phone as this is the PREFERRED & EASIEST way to get in contact with your provider.  Please leave a voice mail if you do not get an answer and she will return your call within 24 hrs. You will NOT be able to contact provider on [employer], as Behavioral Health Providers are restricted. YOU MUST CALL [phone]    If you need to speak with the on call provider after hours or on weekends, please Contact the [facility] ([phone]) and staff will be able to page the provider on call directly.      2.  In the event you need to cancel an appointment, please notify the office at least 24 hrs prior:   Contact **Serene Medical Assistant at [facility] directly at [phone] or the [facility] ([phone])       3, MEDICATION REFILLS:  PLEASE CALL THE PHARMACY TO REQUEST ALL MEDICATION REFILLS or via OWM TO ENSURE YOU ARE RECEIVING YOUR MEDICATIONS IN A TIMELY MANNER. The pharmacy or [facility] will send this request ELECTRONICALLY to the ordering provider.     IF YOU USE AN AUTOMATED SERVICE AT THE PHARMACY FOR REFILLS AND ARE TOLD THERE ARE \"NO REFILLS REMAINING\"   PLEASE CALL THE PHARMACY & SPEAK TO A LIVE PERSON TO VERIFY IT IS THE MOST UP TO DATE PRESCRIPTION ON FILE.    All new prescriptions will have a different number, therefore, if you were given refills for a medication today or at last visit it will not have the same number as the previous prescription.       4.  In the event you have personal crisis, contact the following crisis numbers: Suicide Prevention Hotline 1-277.957.6839 or *988, [facility] Helpline 8-385-928-ROIR; [facility] Emergency Room [phone]; text HELLO to 294837; or 911.  If you feel like harming yourself or others, call 911 right away.  You can call the 260 Suicide and " "Crisis Lifeline at  988   to [facility], or you can connect with one using their online chat  .    5.  Never stop an antidepressant medicine without first talking to a healthcare provider. Suddenly stopping this type of medication can cause withdrawal symptoms.    6.  Counseling and talk therapy  Counseling or therapy teaches you new coping skills and more adaptive ways of thinking about problems. These tools can help you make positive changes. The benefits of counseling often last long after treatment sessions have stopped.    7.   We would appreciate your feedback, please scan the QRS code on the back of your appointment card (or see below) and complete a brief survey.  [place] location is still not available, so please click \"[place]\" location.  Thank you      SPECIFIC RECOMMENDATIONS:     1.      Medications discussed at this encounter:                   - Increase Vyvanse to 40 mg, order sent to      2.      Psychotherapy recommendations: Declined     3.     Return to clinic: 6 weeks    Please arrive at least 15 minutes before your scheduled appointment time to complete check in process.      IF you are scheduled for a [employer] VIDEO visit, YOU MUST COMPLETE THE \"E-CHECK IN\" PROCESS PRIOR TO BEGINNING THE VISIT, YOU WILL NO LONGER RECEIVE A PHONE CALL PRIOR TO ALL VIDEO VISITS; You may still complete the E-Check in for in office visits prior to appointment, you will receive multiple text/email reminders which will direct you further if needed.           "

## 2023-05-12 NOTE — PROGRESS NOTES
This provider is located at [address], [place] [address]. The Patient is seen remotely using [employer]. Patient is being seen via telehealth and confirm that they are in a secure environment for this session. The patient's condition being diagnosed/treated is appropriate for telemedicine. The provider identified himself/herself: herself as well as her credentials.   The patient gave consent to be seen remotely, and when consent is given they understand that the consent allows for patient identifiable information to be sent to a third party as needed.   They may refuse to be seen remotely at any time. The electronic data is encrypted and password protected, and the patient has been advised of the potential risks to privacy not withstanding such measures.    You have chosen to receive care through a telehealth visit.  Do you consent to use a video/audio connection for your medical care today? Yes      Subjective   Thomas Lei is a 33 y.o. male who presents today for follow up    Referring Provider:  Otilia Haskins APRN  [address] [facility]  [address]  [place] [address]    Chief Complaint:  ADHD    History of Present Illness:   5/12/23:  Patient presents today via [employer] Video visit from home, located in [place], [place].  At last visit patient was started on Vyvanse 30 mg due to cost of Azstarys.  Patient reports Vyvanse has been effective, has noticed improvement with ability to sustain attention and able to manage several tasks for work.  Denies side effects, tolerating well.  Patient feels the dose could be adjusted slightly to have complete resolution of symptoms. Patient reports with coupon card the cost was reasonable.     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 Patient working 12-15 hr/days, and weekends 5-6 hrs.        4/12/23:   Patient presents today via  "MyChart Video visit from home, located in [place], [place].  At last visit patient was switched from Adderall XR to Azstarys for which patient reports has been mildly effective, not the same impact as the Adderall XR, denies side effects.  Patient did report the cost of the medication was 125.00 and is uncertain of the amount insurance covered and did not use a coupon card.    Continues to struggle with symptoms of impaired concentration, completing tasks, and attention/focus.  Due to cost of Azstarys, patient is willing to try a cheaper option, Vyvanse had been discussed prior and patient is agreeable to try as Vyvanse does have a coupon code and will be going generic soon.       3/15/23:  Patient presents today via MyChart Video visit from home, located in [place], [place].   Patient reports having difficulty finding a pharmacy that had Adderall XR 20 mg, and received 2 denial letters, 2nd on 2/13/23 after approval of Adderall XR on 2/9/23, and an approval.  Patient has not had medication for at least 30 days.      Symptoms of ADHD remain present, continues to have difficulty sustaining attention, impaired concentration, shifting from one uncompleted activity to another, which has effected daily functioning. Patient would like to switch to alternate medication due to nationwide shortage of Adderall.        2/7/23:  Patient presents today via MyChart Video visit from employer in [place], [place], at last visit Adderall XR was increased from 15 mg to 20 mg daily, for which patient reports \"I would like to stay at 20 mg\" denies difficulty sleeping, able to notice increased in ability to focus which was consistent.      Patient insurance is requiring a PA which is currently pending.  Patient reports new insurance started Dec. 2022 and covered dose of 20 mg which was dispensed 12/28/22.  Patient had stopped taking dose on weekends to have adequate supply during the work week, last dose was Friday 2/3/23.      Wellbutrin XL " "300 mg patient self stopped and reported 1/25/23 due to excessive restlessness while taking with Adderall XR 20 mg.      Patient continues to tolerate Zoloft, anxiety is well controlled.  Denies feeling worried any further with medication.     12/28/22:  Patient presents today via MyChart Video visit from home as patient has 3.5 weeks remaining of maternity leave, increased Adderall XR from 10 mg to 15 mg, which patient reports not noticing a lot of difference in between the different doses.  Patient reports there have been a few days of improvement with focus and ability to complete tasks though not consistent.  Admits to adherence to daily dose.  Denies side effects, though slight decrease in appetite, continues to eat 3 normal size meals with snacks.      Patient reports since increase of Adderall, noting not wearing off until later in the evening, as with the 10 mg noted wearing off earlier around 3 pm.    Overall, patient feels medication does help though not as expected.  Patient has been able to stay on task putting daughter toys together, though feels personal expectations may have been different, as the consistency from day to day is the primary problem patient is experiencing.  \"it feels waveish\", agreeable to start tracking symptoms so at next appointment can provide better details.     Patient continues to experience symptoms of inattentiveness, fidgeting, easy distractability, inability to complete tasks, difficulty sustaining attention, shifting from one uncompleted activity to another, talking excessively, ineffective listening, frequently losing items, and impulsivity.          11/30/22:  Patient presents today via MyChart Video visit from hospital, as patient wife had baby girl on Monday 11/28/22.  Adderall XR 10 mg was started at last visit, for which patient did have difficulty obtaining initially due to pharmacy shortage.  Patient was able to start medication on 11/3/22.  Patient voices some " improvement with Adderall XR, additional focus ability, however, around 3-4 pm has been experiencing some grogginess. Takes daily at 8 am.  Minimal decrease in appetite, denies difficulty with sleep or elevated heart rate.     Patient continues to experience symptoms of inattentiveness, fidgeting, easy distractability, inability to complete tasks, difficulty sustaining attention, shifting from one uncompleted activity to another, talking excessively, ineffective listening, frequently losing items, and impulsivity.     Patient has started new job which is going well, patient does get 12 weeks of paid leave and will be home with wife and other daughter.    Patient reports tolerating Adderall XR with Wellbutrin  mg, denies side effects.         11/3/22:  Patient presents today in office to review ADHD formal testing results and discuss treatment options.  A diagnosed of RAFAEL and ADHD has been confirmed via formal testing on 09/29/2022.     Patient has been doing more meditation and other coping mechanisms -yoga, deep breathing exercises to help manage anxiety, which have been effective.     Patient continues to experience inattentiveness, difficulty with focus, fidgeting, distractability, inability to complete tasks, difficulty sustaining attention, shifting from one uncompleted activity to another, talking excessively, ineffective listening, frequently losing items, and impulsivity.      10/3/22:  Patient presents today via [employer] Video visit from home, reporting had ADHD testing last week with Dr.Brian Dunn.    Increased Wellbutrin XL to 300 mg at last visit which patient reports at times has been effective.  Recently switched to night shift, and has 1 yr old, however, while on days was able to notice improvement of symptoms after 4-5 hrs.  Patient will be moved to day shift later this month, however, will be starting with a new employer in Nov. 7, 2022.  Patient reports new job will provide more stability,  "M-F 9 a-5 pm, and flexibility. Will be on same schedule as wife for the first time.    Patient reports taking the Wellbutrin before start of work.  Denies side effects.       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  Patient has never been evaluated for ADHD. Was started on Zoloft 5/2021 and Wellbutrin  mg was added 2/2022 which was initially effective for anxiety though not as much on attention and focus,  as patient reports anxiety had improved, and no longer having panic attacks feelings of heart racing nor palpitations.     Patient recalls having some anxiety in high school, had a 4.3 GPA and upon starting college grades suffered, and GPA was 2.89.  Patient is currently working as a [employer]. at [employer], and is trying for a promotion which has been a struggle due to difficulty focusing on studying for the position. Next month will be the 5 th attempt.         1. ADHD:   a. Elementary school:   i. Grades:B's  ii. Special classes or failures:No  iii. Got in trouble:\"few times for not sitting still or seated, but nothing outside of that\"  iv. Referral for ADHD testing:No  b. Fhx:Brother (Armando)- 2 yrs younger, started on medications at age 8, older Half Brother (Carlton) (8-10 yrs older) possibly diagnosed at preteen age-only lived with patient for 4 yrs; Younger half Brother (Renato)-ASD,ADD  c. Presently:  i. Problems with attention to detail:Yes, at work misses data, errors, have to resend emails  ii. Problems with sustained attention:Yes, \"it's either super hyper focused or exact opposite going from task to task, could be the type of work for the day.\"  iii. Problems listening when spoken to directly:No  iv. Failure to finish tasks: Yes,\"extreme procrastination with reports, my biggest thing right now is I am " "struggling to study for interviews at [employer] for a propmotion 5 th time in August, struggling to get through the interview, studying piece, will have classical music in background or gets chimes from email and focuses on that instead of studying.\"  v. Avoids tasks that require sustained mental effort:Yes, avoidance of studying for interview, no other times.   vi. Easily distracted:Yes, music, noises of email chimes, phone calls, text messages, \"I am always plugged in because of the job I am on call 24/7, I feel less distracted since I have been working nights, I am less productive vs day shift because I am more involved in operations.\"  vii. Forgetting things:Yes, \"walk into kitchen, open fridge, ask myself what am I doing here, I have a cooler sitting in garage that needs to be thrown in car for one month now. Few times I actually left laptop at home, work is 1 hr in [place], [place].\"  viii. Losing things:Yes, my wallet is a big one, my wife finds it for me, I have established specific areas otherwise I will be searching for days, I once lost my car keys for 3 days.\"  ix. Hard to organize:Yes, \"I am getting better, I use one note for tasks, make them by priority which has helped. I am messier, dirty clothes will sit on floor instead of going into hamper.\"  x. Talks a lot and cutting people off:No \"I have tried to become more of a listener rather than listening to respond.\" \"I used to cut people off all the time, so I am getting better at that, it is an effort.\" Patient now takes pre-notes prior to meeting, which data is needed in order to answer questions, \"if I don't have it answered, I can get it to you by the end of the day.\"  xi. Drifts off during conversations:Yes if conversation is not entertaining or when wife talks about grocery, \"I stay very engaged in work meetings because of the level of my position, I make a decision to close my laptop, to limit distraction.\"  xii. Difficulty with " "Reading:No  xiii. Difficulty watching TV/Movies:Yes, \"If I don't find something exciting, I will go to next movie, series or video.  Even when I am with my parents, I will pull out phone to get more stimulation.\"     In home setting patient admits to wife having to ask several times if listening, due to not interested in topic, \"a lot of the time I hear her kind of, but I think I make a decision to not respond, it's odd.\" Patient has not worked the last 2 days and has made excuses to not change the cat box.  Has some times of using full day of yard work or house hold chores, \"it's either I am extremely productive or I avoid at all cost. It's a lot of excuses.\"  Patient feels avoidance with wife has been ongoing, no changes. Work has made anxiety worse and was started on medications in the last 1.5 yrs.  Patient has had pressure with interviews and promotions.  Patient reports avoiding laundry and would wear all clothing until none remaining, now wife does all the laundry.  Recalls only completing house hold tasks, such as the dishes 90% and wife has to finish the remaining 10%, \"I am the same with food, there's always 10% left on the plate.\"      Symptoms include fidgeting (knee bouncing or spins phone on table or pen in hands), easy distractability, inability to complete tasks, difficulty sustaining attention, shifting from one uncompleted activity to another, talking excessively, ineffective listening, frequently losing items, and impulsivity.  Patient reports frequently allow services to home: bug company spraying every 3 months, bought a 65,000 truck while in [employer], purchased a 1,200 suit one day which was not thought out, and rarely wears.       PHQ-9 Depression Screening  PHQ-9 Total Score:   4/5/2023 2 , reassess 10/2023    Little interest or pleasure in doing things?     Feeling down, depressed, or hopeless?     Trouble falling or staying asleep, or sleeping too much?     Feeling tired or having little " energy?     Poor appetite or overeating?     Feeling bad about yourself - or that you are a failure or have let yourself or your family down?     Trouble concentrating on things, such as reading the newspaper or watching television?     Moving or speaking so slowly that other people could have noticed? Or the opposite - being so fidgety or restless that you have been moving around a lot more than usual?     Thoughts that you would be better off dead, or of hurting yourself in some way?     PHQ-9 Total Score       RAFAEL-7    4/5/2023  0, reassess 10/2023    Past Surgical History:  Past Surgical History:   Procedure Laterality Date   • ENDOSCOPY N/A 4/14/2022    Procedure: ESOPHAGOGASTRODUODENOSCOPY WITH BX;  Surgeon: Joan Luna MD;  Location: [facility];  Service: Gastroenterology;  Laterality: N/A;  ESOPHAGITIS, GASTRITIS, ESOPHAGEAL STRICTURE   • ENDOSCOPY N/A 5/23/2022    Procedure: ESOPHAGOGASTRODUODENOSCOPY WITH 10-15MM BALLOON DILATATION AND BIOPSIES;  Surgeon: Joan Luna MD;  Location: [facility] ENDOSCOPY;  Service: Gastroenterology;  Laterality: N/A;  GASTRITIS, ESOPHAGITIS, ESOPHAGEAL STRICTURE   • HERNIA REPAIR      Hernia Repair: at age 17; right inguinal; uncomplicated; dual;   • SHOULDER SURGERY      left shoulder labrum repair at age 22 - sports related injury       Problem List:  Patient Active Problem List   Diagnosis   • Esophageal dysphagia   • Anxiety   • Attention and concentration deficit       Allergy:   No Known Allergies     Discontinued Medications:  There are no discontinued medications.    Current Medications:   Current Outpatient Medications   Medication Sig Dispense Refill   • lisdexamfetamine (Vyvanse) 30 MG capsule Take 1 capsule by mouth Every Morning 30 capsule 0   • sertraline (ZOLOFT) 50 MG tablet Take 1 tablet by mouth Every Morning. 30 tablet 5     No current facility-administered medications for this visit.       Past Medical History:  Past Medical  History:   Diagnosis Date   • Anxiety disorder, unspecified    • Concussion     X3   • Dysphagia, unspecified    • Fever, unspecified    • Head injury    • Influenza due to identified novel influenza A virus with other respiratory manifestations    • Panic disorder    • PONV (postoperative nausea and vomiting)    • Procreative counseling and advice using natural family planning        Past Psychiatric History:  Began Treatment:2021  Diagnoses:Anxiety  Psychiatrist:Denies  Therapist:Denies  Admission History:Denies  Medication Trials:Wellbutrin XL up to 300 mg self stopped 1/2023 due to excessive restlessness with combination of Adderall XR 20 mg Adderall XR 20 mg eff.due to nationwide shortage stopped 1/2023; Azstarys lowest dose 1 month, due to cost stopping,somewhat eff.  Self Harm: Denies  Suicide Attempts:Denies      Substance Abuse History:   Types:Denies all, including illicit  Withdrawal Symptoms:Denies  Longest Period Sober:Not Applicable   AA: Not applicable     Social History:  Martial Status:  Employed:Yes and If so, where [email or web address] and . 9a-5p  Kids:Yes or If so, how many 2 girls    House:Lives in a house   History: [id number]  Access to Guns: Yes, put in storage not in home    Social History     Socioeconomic History   • Marital status:    • Number of children: 1   • Years of education: 16   • Highest education level: Bachelor's degree (e.g., BA, AB, BS)   Tobacco Use   • Smoking status: Never   • Smokeless tobacco: Former     Types: Chew     Quit date: 5/21/2022   Vaping Use   • Vaping Use: Never used   Substance and Sexual Activity   • Alcohol use: Yes     Comment: 1-2 drinks per month    • Drug use: Not Currently     Types: Marijuana     Comment: back in high school   • Sexual activity: Yes       Family History:   Suicide Attempts: Brother  Suicide Completions:Denies      Family History   Problem Relation Age of Onset   •  Sjogren's syndrome Mother    • Diverticulitis Mother    • Cardiomyopathy Father    • Suicide Attempts Brother    • Seizures Brother    • ADD / ADHD Brother    • ADD / ADHD Brother    • Cardiomyopathy Paternal Uncle    • Dementia Maternal Grandmother        Developmental History:   Born: [place]  Siblings:5   Childhood: Denies Abuse  High School:Completed  College:Bachelors in [employer] kinesiology     Mental Status Exam:   Hygiene:   good  Cooperation:  Cooperative  Eye Contact:  Good  Psychomotor Behavior:  Appropriate  Affect:  Appropriate  Mood: euthymic  Speech:  Normal  Thought Process:  Goal directed  Thought Content:  Mood congruent  Suicidal:  None  Homicidal:  None  Hallucinations:  None  Delusion:  None  Memory:  Intact  Orientation:  Person, Place, Time and Situation  Reliability:  good  Insight:  Good  Judgement:  Good  Impulse Control:  Good  Physical/Medical Issues:  Yes Esophageal Dysphagia     Review of Systems:  Review of Systems   Constitutional: Negative for diaphoresis and fatigue.   HENT: Negative for drooling.    Eyes: Negative for visual disturbance.   Respiratory: Negative for cough and shortness of breath.    Cardiovascular: Negative for chest pain, palpitations and leg swelling.   Gastrointestinal: Negative for nausea and vomiting.   Endocrine: Negative for cold intolerance and heat intolerance.   Genitourinary: Negative for difficulty urinating.   Musculoskeletal: Negative for joint swelling.   Allergic/Immunologic: Negative for immunocompromised state.   Neurological: Negative for dizziness, tremors, seizures, speech difficulty and numbness.   Psychiatric/Behavioral: Positive for decreased concentration. Negative for hallucinations, self-injury, sleep disturbance and suicidal ideas. The patient is not nervous/anxious and is not hyperactive.          Physical Exam:  Physical Exam  Psychiatric:         Attention and Perception: Attention and perception normal.         Mood and Affect: Mood and  affect normal.         Speech: Speech normal.         Behavior: Behavior normal. Behavior is cooperative.         Thought Content: Thought content normal. Thought content does not include suicidal ideation. Thought content does not include suicidal plan.         Cognition and Memory: Cognition and memory normal.         Judgment: Judgment normal.         Vital Signs:   There were no vitals taken for this visit.     Lab Results:   No visits with results within 6 Month(s) from this visit.   Latest known visit with results is:   Clinical Support on 11/03/2022   Component Date Value Ref Range Status   • Amphetamine Screen, Urine 11/03/2022 Negative  Negative Final   • Barbiturates Screen, Urine 11/03/2022 Negative  Negative Final   • Buprenorphine, Screen, Urine 11/03/2022 Negative  Negative Final   • Benzodiazepine Screen, Urine 11/03/2022 Negative  Negative Final   • Cocaine Screen, Urine 11/03/2022 Negative  Negative Final   • MDMA (ECSTASY) 11/03/2022 Negative  Negative Final   • Methamphetamine, Ur 11/03/2022 Negative  Negative Final   • Methadone Screen, Urine 11/03/2022 Negative  Negative Final   • Opiate Screen 11/03/2022 Negative  Negative Final   • Oxycodone Screen, Urine 11/03/2022 Negative  Negative Final   • Phencyclidine (PCP), Urine 11/03/2022 Negative  Negative Final   • THC, Screen, Urine 11/03/2022 Negative  Negative Final   • Lot Number 11/03/2022 [id number]   Final   • Expiration Date 11/03/2022 02/29/2024   Final       EKG Results:  No orders to display       Imaging Results:  No Images in the past 120 days found..      Assessment & Plan       Visit Diagnoses:    ICD-10-CM ICD-9-CM   1. ADHD (attention deficit hyperactivity disorder), inattentive type  F90.0 314.00       PLAN:  1.   Safety: No acute safety concerns  2. Therapy: None  3. Risk Assessment: Risk of self-harm acutely is moderate.  Risk factors include anxiety disorder,family history, access to guns/weapons, and recent psychosocial  stressors (pandemic). Protective factors include no present SI, no history of suicide attempts or self-harm in the past, minimal AODA, healthcare seeking, future orientation, willingness to engage in care.  Risk of self-harm chronically is also moderate, but could be further elevated in the event of treatment noncompliance and/or AODA.  4. Meds:  Continue Zoloft 50 mg by mouth daily to target anxiety.      Increase Vyvanse FROM 30 mg TO 40 mg by mouth daily in the morning without food to target symptoms associated with ADHD.  Instructed patient to take on an empty stomach to avoid delayed onset of action, and may eat 20-30 minutes after taking medication, though if unable to tolerate or wait to eat to expect the onset of action to be delayed.  Risks, benefits, and alternatives discussed with patient including insomnia, headache, irritability, overstimulation, tremor, dizziness, nausea, dry mouth, constipation, diarrhea, weight loss, sexual dysfunction, heart palpitations, elevated heart rate, elevated blood pressure, seizures and suicidal ideations though rare.  After discussion of these risks patient agreed to proceed with Vyvanse. Discussed all questions and concerns.  Patient instructed to contact provider immediately with any intolerable side effects, cardiac symptoms, suicidal ideations,  or worsening anxiety. Last dispensed 4/12/23 #30/30 days.    Informed patient order would be sent to Dr. Singh in separate entry for signature due to EMR system, and will not see as refilled on AVS today.       Controlled substance documentation: [place] reviewed; prior urine drug screen consistent obtained 11/3/22; consent is up to date, signed, witnessed and in EHR, dated 11/3/22, Vyvanse consent for treatment form dated 4/12/23, which will be updated annually per policy. Patient is aware of risk of addiction on this medication, understands need to follow up for a review every 3 months and medications will be adjusted or  decreased as deemed appropriate at each visit.  No history of drug or alcohol abuse.  No concerns about diversion or abuse. Patient denies side effects related to the medication.  Patient is aware of random urine drug screens and pill counts. The dosing of this medication will be reviewed on a regular basis and reduced if possible..  Ongoing use of a controlled substance is necessary for this patient to have a normal quality of life.  5. Labs: n/a    Symptoms of ADHD are under fair control with Vyvanse 30 mg, will increase dose today to 40 mg. Tolerating well without reported side effects.   Patient to contact provider if symptoms worsen or fail to improve.        4/12/23:   -Continue Zoloft 50 mg by mouth daily to target anxiety.    -Stop Azstarys (Serdexmethylphenidate and dexmethylphenidate) 26.1 mg-5.2 mg due to cost of medication.   -Start  Vyvanse 30 mg by mouth daily in the morning without food to target symptoms associated with ADHD.  Instructed patient to take on an empty stomach to avoid delayed onset of action, and may eat 20-30 minutes after taking medication, though if unable to tolerate or wait to eat to expect the onset of action to be delayed.  Risks, benefits, and alternatives discussed with patient including insomnia, headache, irritability, overstimulation, tremor, dizziness, nausea, dry mouth, constipation, diarrhea, weight loss, sexual dysfunction, heart palpitations, elevated heart rate, elevated blood pressure, seizures and suicidal ideations though rare.  After discussion of these risks patient agreed to proceed with Vyvanse. Discussed all questions and concerns.  Patient instructed to contact provider immediately with any intolerable side effects, cardiac symptoms, suicidal ideations,  or worsening anxiety.     Informed patient order would be sent to Dr. Singh in separate entry for signature due to EMR system, and will not see as refilled on AVS today.  Instructed patient to come by  [place] office today as he planned to sign medication portion controlled substance for Azstarys and Vyvanse.     Symptoms of ADHD are not under good control with Azstarys and due to high cost patient wishes to switch to Vyvanse.   Contacted [place] office staff and instructed staff to place another controlled substance consent to treat with Vyvanse and place in provider box for patient to sign both forms today.  Will send Vyvanse to [facility] as patient is going there today. Patient to contact provider if symptoms worsen or fail to improve.     3/15/23:  Continue Zoloft 50 mg by mouth daily to target anxiety.    -Discontinue Adderall XR 20 mg due to nationwide shortage patient has not been able to locate a pharmacy that has medication in stock, and has not had medication for 1 month.  last dispensed 12/28/22 #30/30 days.      Start Azstarys (Serdexmethylphenidate and dexmethylphenidate) 26.1 mg-5.2 mg by mouth daily in the morning on an empty stomach (may eat in 20-30 minutes to help with absorption) to target symptoms associated with ADHD. Risks, benefits, side effects discussed with patient including elevated heart rate, elevated blood pressure, irritability, insomnia, sexual dysfunction, appetite suppressing properties, psychosis. After discussion of these risks and benefits, the patient voiced understanding and agreed to proceed. Informed patient order would be sent to Dr. Singh in separate entry for signature due to EMR system, and will not see as refilled on AVS today.  Instructed patient to come by [place] office within the next week to sign medication portion controlled substance for Azstarys.     Due to nationwide shortage of Adderall, discussed various options for ADHD treatment, patient willing to switch to alternate medication. Symptoms of ADHD remain present as patient has not had medication for at least 1 month.  Patient to contact provider if unable to  medication by tomorrow,  "given direct contact number for MA at [place] office.  Patient to contact provider if symptoms worsen or fail to improve.       2/9/23:   Your request has been approved  Your PA request has been approved. Additional information will be provided in the approval communication. 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 -TC, LPN       ADDERALL XR 20MG            11:56 AM    Serene Salazar MA contacted Thomas Lei \"Renard\"    Serene Salazar MA         11:58 AM  Note    Called patient to let him know that we have received the approval for his Adderall XR 20mg        2/7/23:  Continue Zoloft 50 mg by mouth daily to target anxiety.      Continue Adderall XR 20 mg by mouth daily in the morning to target symptoms associated with ADHD. Risks, benefits, side effects discussed with patient including elevated heart rate, elevated blood pressure, irritability, insomnia, sexual dysfunction, appetite suppressing properties, psychosis.  After discussion of these risks and benefits, the patient voiced understanding and agreed to proceed. Channing reviewed, last dispensed 12/28/22 #30/30 days.  New order for 20 mg dose sent 1/26/23 to [facility] pharmacy due to [facility] pharmacy out of stock, however, awaiting PA from insurance at this time.     Patient noted with improvement of ADHD symptoms with dose increase of Adderall XR to 20 mg, however, PA is required which was reviewed with patient during visit.  Patient given detailed information to follow up with insurance company to determine specifics regarding which and how many medications have to be tried before restarting current " dose of Adderall and contact office to MA directly later today.  Patient was not allowed option to pay out of pocket from pharmacy.  Will be in touch with patient later today.        2/6/23:  VICENTA LEI Key: [id number] - PA Case ID: [id number] - Rx #: [id number]  PA for Adderall XR 20mg sent to plan 02/06/2023 awaiting decision from insurance company      2/2/23: [employer] ([employer])hart message from patient wife to PCP which was directed to this provider:   This message is being sent by Cindi Lei on behalf of Vicenta Lei.   Hi Dr. Haskins,     This is Cindi Lei on Vicenta Lei account. I am trying to  his medication, the generic Adderral and they are saying the extended release capsules are on backorder. The only thing the have in stock are non extended release tablets. Are you able to switch the prescription to that so it can be filled until the other kind comes in? [facility]. Thank you!  Patient is aware that communication with behavioral health has to be done via telephone, and when the pharmacy does not have supply in stock, as this has happened before, he is to contact other pharmacies to determine availability and then inform the office.  The prescription was ordered 1/26/23 which was 7 days ago, patient will need to follow process per [employer].         1/25/23:   After Rescheduling patient today he wanted me to let Celina know that the Adderall XR 20 mg seems to be working well, but he did DC the Wellbutrin due to excessive restlessness.  I rescheduled patient to February 07/2023.      12/28/22:   -Continue Zoloft 50 mg by mouth daily to target anxiety.    -Continue Wellbutrin  mg po daily in the morning to target attention and concentration deficit.      Increase Adderall XR from 15 mg TO 20 mg by mouth daily in the morning to target symptoms associated with ADHD. Risks, benefits, side effects discussed with patient including elevated heart rate, elevated blood pressure, irritability,  insomnia, sexual dysfunction, appetite suppressing properties, psychosis.  After discussion of these risks and benefits, the patient voiced understanding and agreed to proceed. Channing reviewed, last dispensed 15 mg 11/30/22 #30/30 days.  Informed patient order would be sent to JUAN Lang in practice, as  is out of office today, in separate entry for signature due to EMR system, and will not see as refilled on AVS today.  Adderall XR 15 mg providing inconsistent relief of ADHD symptoms, denies side effects, will increase dose today and have patient return in 3 weeks, as patient will be returning to work before 4 weeks from today.  Patient to track daily symptoms, activities, ability to complete tasks and report at next appointment.  Patient instructed to notify provider if experiencing insomnia as combination with Wellbutrin XL may effect sleep and dose may need to be lowered.  Patient to contact provider if symptoms worsen or fail to improve.       11/30/22:  Continue Zoloft 50 mg by mouth daily to target anxiety.      Continue Wellbutrin  mg po daily in the morning to target attention and concentration deficit.  Instructed patient to request refill in 2-3 weeks from pharmacy if continues to tolerate with Adderall XR, otherwise to call office and request dose decrease to 150 mg.   Increase Adderall XR from 10 mg TO 15 mg by mouth daily in the morning to target symptoms associated with ADHD. Risks, benefits, side effects discussed with patient including elevated heart rate, elevated blood pressure, irritability, insomnia, sexual dysfunction, appetite suppressing properties, psychosis.  After discussion of these risks and benefits, the patient voiced understanding and agreed to proceed. Channing reviewed, last dispensed 10 mg dose 11/3/22 #30/30 days.  Informed patient order would be sent to Dr. Singh in separate entry for signature due to EMR system, and will not see as refilled on AVS  today.  Controlled substance documentation: Channing reviewed; prior urine drug screen consistent obtained 11/3/22; consent is up to date, signed, witnessed and in EHR, dated 11/3/22, which will be updated annually per policy. Patient is aware of risk of addiction on this medication, understands need to follow up for a review every 3 months and medications will be adjusted or decreased as deemed appropriate at each visit.  No history of drug or alcohol abuse.  No concerns about diversion or abuse. Patient denies side effects related to the medication.  Patient is aware of random urine drug screens and pill counts. The dosing of this medication will be reviewed on a regular basis and reduced if possible..  Ongoing use of a controlled substance is necessary for this patient to have a normal quality of life.    Anxiety symptoms are under good control with Zoloft and Wellbutrin XL.  ADHD symptoms remain present, minimal effectiveness with 10 mg dose of Adderall XR, tolerating well without side effects, will increase dose today and send to [facility] pharmacy, patient instructed to contact either [place] office and leave vm with MA and if not heard back within 1-2 hrs to call the [place] office, or patient may call the [facility] office initially as MA is not physically in the [place] office today though checks vm.  Patient verbalized understanding. Patient to contact provider if symptoms worsen or fail to improve.     11/3/22:   Patient called to inform [facility], primary pharmacy, does not have Adderall XR 10 mg in stock, patient was instructed to call other local pharmacies to check availability.   Patient called back to tell us that he checked around and [facility] has the Adderall.  Please send there Pharmacy changed and med pended in chart    11/3/22:  Continue Zoloft 50 mg by mouth daily to target anxiety.      Continue Wellbutrin  mg po daily in the morning to target attention and  concentration deficit.      Start Adderall XR 10 mg by mouth daily in the morning to target symptoms associated with ADHD. Risks, benefits, side effects discussed with patient including elevated heart rate, elevated blood pressure, irritability, insomnia, sexual dysfunction, appetite suppressing properties, psychosis.  After discussion of these risks and benefits, the patient voiced understanding and agreed to proceed. Channing reviewed, UDS ordered, and controlled substance agreement signed & witnessed. Informed patient prescription would not be ordered until UDS results noted as negative, expected results within 30 minutes of providing sample.   POC UDS today in clinic  ADHD testing results reviewed from Dr. Eladio Dunn,[facility] dated 9/29/2022 indicating a diagnosis of RAFAEL and ADHD.     Patient to request refills for Wellbutrin XL and Zoloft in approximately 2 weeks, as patient has been educated he may need to decrease Wellbutrin XL dose due to addition of Adderall XR.  Patient to contact provider if symptoms worsen or fail to improve.     10/3/22:   -Continue Zoloft 50 mg by mouth daily to target anxiety.    -Continue Wellbutrin  mg po daily in the morning to target attention and concentration deficit.     Patient denies need for refills at this time, tolerating current medications well without reported side effects.  Patient reported completing ADHD testing last week with Dr. Eladio Dunn, report not expected for at least 2 weeks. Will plan on next appointment in 4 weeks in person due to possible stimulant medication needed, as patient will be starting an new job Nov 6 and would prefer to come in the week prior.  If ADHD report findings indicate no formal diagnosis of ADHD will contact patient prior to next appointment.  Patient to contact provider if symptoms worsen or fail to improve.       7/26/22:   Declined therapy  Continue Zoloft 50 mg by mouth daily to target anxiety.  Risks, benefits, alternatives  discussed with patient including GI upset, nausea vomiting diarrhea, theoretical decrease of seizure threshold predisposing the patient to a slightly higher seizure risk, headaches, sexual dysfunction, serotonin syndrome, bleeding risk, increased suicidality in patients 24 years and younger.  After discussion of these risks and benefits, the patient voiced understanding and agreed to proceed.  Increase Wellbutrin XL from 150 mg to 300 mg (instructed to start taking 2 tabs of the 150 mg on hand until new prescription picked up) po daily in the morning to target attention and concentration deficit. Discussed all risks, benefits, alternatives, and side effects of Bupropion/Wellbutrin including but not limited to GI upset (N/V/D, constipation), tachycardia, diaphoresis, weight loss, agitation, dizziness, headache, insomnia, tremor, blurred vision, anorexia, HTN, activation of char or hypomania, CNS stimulation and neuropsychiatric effects, ocular effects, seizure risk, withdrawal syndrome following abrupt discontinuation, and activation of suicidal ideation and behavior. Patient  educated on the need to practice safe sex while taking this med. Discussed the need for patient to immediately call the office for any new or worsening symptoms, such as worsening depression; feeling nervous or restless; suicidal thoughts or actions; or other changes in mood or behavior, and all other concerns. Patient educated on medication compliance. Patient  verbalized understanding and is agreeable to taking Bupropion/Wellbutrin. Addressed all questions and concerns.     Referral for ADHD testing.  Patient to contact provider and set up appointment.  And to contact office if unable to get an appointment scheduled. -Attached list of several other sites for ADHD testing. Patient instructed to contact providers on list to determine availability of appointments, instructed patient to take notes while calling places indicating first available  appointment, and to ask to be placed on waiting list.  If an office is chosen and requests the referral order please contact my Medical Assistant, Serene, directly at [phone] informing of chosen office and the information will be sent.    Dr. Eladio Dunn, Psychologist, MS, LPP  [address], [address]  [place] [id number] [phone]   Currently only accepting referrals for psychological testing services.  Accepts Most insurance plans except Medicare Plans    Patient presentation seems most consistent with anxiety vs ADHD, due to high suspicion of ADHD will refer for formal testing.  Increased Wellbutrin XL today , will see patient back in 4 weeks.       Patient screened positive for depression based on a PHQ-9 score of 2 on 4/5/2023. Follow-up recommendations include: Prescribed antidepressant medication treatment and Suicide Risk Assessment performed.     TREATMENT PLAN/GOALS: Continue supportive psychotherapy efforts and medications as indicated. Treatment and medication options discussed during today's visit. Patient ackowledged and verbally consented to continue with current treatment plan and was educated on the importance of compliance with treatment and follow-up appointments.    MEDICATION ISSUES:  ANUSHA reviewed as expected.  Discussed medication options and treatment plan of prescribed medication as well as the risks, benefits, and side effects including potential falls, possible impaired driving and metabolic adversities among others. Patient is agreeable to call the office with any worsening of symptoms or onset of side effects. Patient is agreeable to call 911 or go to the nearest ER should he/she begin having SI/HI. No medication side effects or related complaints today.     MEDS ORDERED DURING VISIT:  No orders of the defined types were placed in this encounter.      Return in about 6 weeks (around 6/23/2023) for Video visit, medication check.         I spent 22 minutes caring  alix Guzman on this date of service. This time includes time spent by me in the following activities: preparing for the visit, performing a medically appropriate examination and/or evaluation, counseling and educating the patient/family/caregiver, referring and communicating with other health care professionals, documenting information in the medical record and care coordination.      This document has been electronically signed by JUAN Hayes  May 12, 2023 08:17 EDT      Part of this note may be an electronic transcription/translation of spoken language to printed text using the Dragon Dictation System.

## 2023-06-15 DIAGNOSIS — F90.0 ADHD (ATTENTION DEFICIT HYPERACTIVITY DISORDER), INATTENTIVE TYPE: ICD-10-CM

## 2023-08-16 ENCOUNTER — TELEMEDICINE (OUTPATIENT)
Dept: PSYCHIATRY | Facility: CLINIC | Age: 33
End: 2023-08-16
Payer: COMMERCIAL

## 2023-08-16 DIAGNOSIS — F90.0 ADHD (ATTENTION DEFICIT HYPERACTIVITY DISORDER), INATTENTIVE TYPE: Primary | ICD-10-CM

## 2023-08-16 DIAGNOSIS — F90.0 ADHD (ATTENTION DEFICIT HYPERACTIVITY DISORDER), INATTENTIVE TYPE: ICD-10-CM

## 2023-08-16 NOTE — PROGRESS NOTES
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  Negative Final    MDMA (ECSTASY) 11/03/2022 Negative  Negative Final    Methamphetamine, Ur 11/03/2022 Negative  Negative Final    Methadone Screen, Urine 11/03/2022 Negative  Negative Final    Opiate Screen 11/03/2022 Negative  Negative Final    Oxycodone Screen, Urine 11/03/2022 Negative  Negative Final    Phencyclidine (PCP), Urine 11/03/2022 Negative  Negative Final    THC, Screen, Urine 11/03/2022 Negative  Negative Final    Lot Number 11/03/2022 [id number]   Final    Expiration Date 11/03/2022 02/29/2024   Final       EKG Results:  No orders to display       Imaging Results:  No Images in the past 120 days found..      Assessment & Plan       Visit Diagnoses:    ICD-10-CM ICD-9-CM   1. ADHD (attention deficit hyperactivity disorder), inattentive type  F90.0 314.00         PLAN:  1.   Safety: No acute safety concerns  Therapy: None  Risk Assessment: Risk of self-harm acutely is moderate.  Risk factors include anxiety disorder,family history, access to guns/weapons, and recent psychosocial stressors (pandemic). Protective factors include no present SI, no history of suicide attempts or self-harm in the past, minimal AODA, healthcare seeking, future orientation, willingness to engage in care.  Risk of self-harm chronically is also moderate, but could be further elevated in the event of treatment  noncompliance and/or AODA.  Meds:  Continue Zoloft 50 mg by mouth daily to target anxiety.      Continue Vyvanse 40 mg by mouth daily in the morning without food to target symptoms associated with ADHD.  Instructed patient to take on an empty stomach to avoid delayed onset of action, and may eat 20-30 minutes after taking medication, though if unable to tolerate or wait to eat to expect the onset of action to be delayed.  Risks, benefits, and alternatives discussed with patient including insomnia, headache, irritability, overstimulation, tremor, dizziness, nausea, dry mouth, constipation, diarrhea, weight loss, sexual dysfunction, heart palpitations, elevated heart rate, elevated blood pressure, seizures and suicidal ideations though rare.  After discussion of these risks patient agreed to proceed with Vyvanse. Discussed all questions and concerns.  Patient instructed to contact provider immediately with any intolerable side effects, cardiac symptoms, suicidal ideations,  or worsening anxiety. Last dispensed 7/19/23 #30/30 days. Informed patient order would be sent to Dr. Singh in separate entry for signature due to EMR system, and will not see as refilled on AVS today.  With first fill date of 8/17/23.     Controlled substance documentation: Channing reviewed; prior urine drug screen consistent obtained 11/3/22; consent is up to date, signed, witnessed and in EHR, dated 11/3/22, Vyvanse consent for treatment form dated 4/12/23, which will be updated annually per policy. Patient is aware of risk of addiction on this medication, understands need to follow up for a review every 3 months and medications will be adjusted or decreased as deemed appropriate at each visit.  No history of drug or alcohol abuse.  No concerns about diversion or abuse. Patient denies side effects related to the medication.  Patient is aware of random urine drug screens and pill counts. The dosing of this medication will be reviewed on a regular  basis and reduced if possible. Ongoing use of a controlled substance is necessary for this patient to have a normal quality of life.  Labs: n/a    Symptoms of ADHD are under good control with current medication regimen.  Next visit will be in the office for annual CSA and UDS, scheduled for Friday Nov. 3, 2023 at 8 am.   Patient was given instructions and counseling regarding condition and for health maintenance advice. Please see specific information pulled into the AVS if appropriate.    Patient to contact provider if symptoms worsen or fail to improve.         23:  Continue Zoloft 50 mg by mouth daily to target anxiety.  Refilled today for 90 days with 3 refills.    Continue Vyvanse 40 mg by mouth daily in the morning without food to target symptoms associated with ADHD. Last dispensed 6/15/23 #30/ days.  Informed patient order would be sent to Dr. Lyles in separate entry for signature due to EMR system, and will not see as refilled on AVS today, as Dr. Singh is out of the office.    Symptoms of anxiety and ADHD are under good control with current medication regimen.  Discussed option for a 90 day supply once long term dose determined, as patient started 40 mg of Vyvanse 23, will see back in 4 weeks to determine if further dose adjustments are needed.  Patient informed current CSA will  in Nov. 3 And will need to be seen in office by or before Nov. 3, 2023.   Patient was given instructions and counseling regarding condition and for health maintenance advice. Please see specific information pulled into the AVS if appropriate.    Patient to contact provider if symptoms worsen or fail to improve.         23:  -Continue Zoloft 50 mg by mouth daily to target anxiety.  Due to upcoming travel to [place] and [place] from 23-23, will send in a quantity of 4 to [facility] pharmacy today, will fill for 90 days at next appointment.   -Continue Vyvanse 40 mg by mouth daily in  the morning without food to target symptoms associated with ADHD.  Instructed patient to take on an empty stomach to avoid delayed onset of action, and may eat 20-30 minutes after taking medication, though if unable to tolerate or wait to eat to expect the onset of action to be delayed.  Risks, benefits, and alternatives discussed with patient including insomnia, headache, irritability, overstimulation, tremor, dizziness, nausea, dry mouth, constipation, diarrhea, weight loss, sexual dysfunction, heart palpitations, elevated heart rate, elevated blood pressure, seizures and suicidal ideations though rare.  After discussion of these risks patient agreed to proceed with Vyvanse. Discussed all questions and concerns.  Patient instructed to contact provider immediately with any intolerable side effects, cardiac symptoms, suicidal ideations,  or worsening anxiety. Last dispensed 6/15/23 #30/30 days. Due to upcoming travel to [place] and [place] from 6/30/23-7/18/23, will send in a quantity of 4 to [facility] pharmacy today with allowed early fill.  Informed patient order would be sent to Dr. Singh in separate entry for signature due to EMR system, and will not see as refilled on AVS today.     Controlled substance documentation: Channing reviewed; prior urine drug screen consistent obtained 11/3/22; consent is up to date, signed, witnessed and in EHR, dated 11/3/22, Vyvanse consent for treatment form dated 4/12/23, which will be updated annually per policy. Patient is aware of risk of addiction on this medication, understands need to follow up for a review every 3 months and medications will be adjusted or decreased as deemed appropriate at each visit.  No history of drug or alcohol abuse.  No concerns about diversion or abuse. Patient denies side effects related to the medication.  Patient is aware of random urine drug screens and pill counts. The dosing of this medication will be reviewed on a regular basis  and reduced if possible. Ongoing use of a controlled substance is necessary for this patient to have a normal quality of life.  Symptoms of ADHD and anxiety are under good control with current medication regimen. Tolerating well without reported side effects.   Patient to contact provider if symptoms worsen or fail to improve.        5/12/23:  Symptoms of ADHD are under fair control with Vyvanse 30 mg, will increase dose today to 40 mg. Last dispensed 4/12/23 #30/30 days. Tolerating well without reported side effects.   Patient to contact provider if symptoms worsen or fail to improve.        4/12/23:   -Continue Zoloft 50 mg by mouth daily to target anxiety.    -Stop Azstarys (Serdexmethylphenidate and dexmethylphenidate) 26.1 mg-5.2 mg due to cost of medication.   -Start  Vyvanse 30 mg by mouth daily in the morning without food to target symptoms associated with ADHD.  Instructed patient to take on an empty stomach to avoid delayed onset of action, and may eat 20-30 minutes after taking medication, though if unable to tolerate or wait to eat to expect the onset of action to be delayed.  Risks, benefits, and alternatives discussed with patient including insomnia, headache, irritability, overstimulation, tremor, dizziness, nausea, dry mouth, constipation, diarrhea, weight loss, sexual dysfunction, heart palpitations, elevated heart rate, elevated blood pressure, seizures and suicidal ideations though rare.  After discussion of these risks patient agreed to proceed with Vyvanse. Discussed all questions and concerns.  Patient instructed to contact provider immediately with any intolerable side effects, cardiac symptoms, suicidal ideations,  or worsening anxiety.     Informed patient order would be sent to Dr. Signh in separate entry for signature due to EMR system, and will not see as refilled on AVS today.  Instructed patient to come by [place] office today as he planned to sign medication portion controlled  substance for Azstarys and Vyvanse.     Symptoms of ADHD are not under good control with Azstarys and due to high cost patient wishes to switch to Vyvanse.   Contacted [place] office staff and instructed staff to place another controlled substance consent to treat with Vyvanse and place in provider box for patient to sign both forms today.  Will send Vyvanse to [facility] as patient is going there today. Patient to contact provider if symptoms worsen or fail to improve.     3/15/23:  Continue Zoloft 50 mg by mouth daily to target anxiety.    -Discontinue Adderall XR 20 mg due to nationwide shortage patient has not been able to locate a pharmacy that has medication in stock, and has not had medication for 1 month.  last dispensed 12/28/22 #30/30 days.      Start Azstarys (Serdexmethylphenidate and dexmethylphenidate) 26.1 mg-5.2 mg by mouth daily in the morning on an empty stomach (may eat in 20-30 minutes to help with absorption) to target symptoms associated with ADHD. Risks, benefits, side effects discussed with patient including elevated heart rate, elevated blood pressure, irritability, insomnia, sexual dysfunction, appetite suppressing properties, psychosis. After discussion of these risks and benefits, the patient voiced understanding and agreed to proceed. Informed patient order would be sent to Dr. Singh in separate entry for signature due to EMR system, and will not see as refilled on AVS today.  Instructed patient to come by [place] office within the next week to sign medication portion controlled substance for Azstarys.     Due to nationwide shortage of Adderall, discussed various options for ADHD treatment, patient willing to switch to alternate medication. Symptoms of ADHD remain present as patient has not had medication for at least 1 month.  Patient to contact provider if unable to  medication by tomorrow, given direct contact number for MA at [facility].  Patient to contact  "provider if symptoms worsen or fail to improve.       2/9/23:   Your request has been approved  Your PA request has been approved. Additional information will be provided in the approval communication. **LPN Reviewed- Complete Clinical**COT, paid claim//MDO verified information// Last paid claim 12-// Adderall XR 20 MG #30/30ds//F90.0 Attn-defct hyperactivity disorder, predom inattentive type//75 Prior Authorization Reqrd +MUST Use generics, AZSTARYS, JORNAY PM, MYDAYIS, VYVANSE MEDICAL NECESSITY PA ONLY [phone] Drug Requires Prior Authorization// Approve, 36 months. -TC, LPN       ADDERALL XR 20MG            11:56 AM    Serene Salazar MA contacted Thomas Lei \"Renard\"    Serene Salazar MA         11:58 AM  Note    Called patient to let him know that we have received the approval for his Adderall XR 20mg        2/7/23:  Continue Zoloft 50 mg by mouth daily to target anxiety.      Continue Adderall XR 20 mg by mouth daily in the morning to target symptoms associated with ADHD. Risks, benefits, side effects discussed with patient including elevated heart rate, elevated blood pressure, irritability, insomnia, sexual dysfunction, appetite suppressing properties, psychosis.  After discussion of these risks and benefits, the patient voiced understanding and agreed to proceed. Channing reviewed, last dispensed 12/28/22 #30/30 days.  New order for 20 mg dose sent 1/26/23 to [facility] pharmacy due to [facility] pharmacy out of stock, however, awaiting PA from insurance at this time.     Patient noted with improvement of ADHD symptoms with dose increase of Adderall XR to 20 mg, however, PA is required which was reviewed with patient during visit.  Patient given detailed information to follow up with insurance company to determine specifics regarding which and how many medications have to be tried before restarting current dose of Adderall and contact office to MA directly later today.  Patient was " not allowed option to pay out of pocket from pharmacy.  Will be in touch with patient later today.        2/6/23:  VICENTA LEI Key: [id number] - PA Case ID: [id number] - Rx #: [id number]  PA for Adderall XR 20mg sent to plan 02/06/2023 awaiting decision from insurance company      2/2/23: iStore[employer] message from patient wife to PCP which was directed to this provider:   This message is being sent by Cindi Lei on behalf of Vicenta Lei.   Hi Dr. Haskins,     This is Cindi Lei on Vicenta Lei account. I am trying to  his medication, the generic Adderral and they are saying the extended release capsules are on backorder. The only thing the have in stock are non extended release tablets. Are you able to switch the prescription to that so it can be filled until the other kind comes in? [facility]. Thank you!  Patient is aware that communication with behavioral health has to be done via telephone, and when the pharmacy does not have supply in stock, as this has happened before, he is to contact other pharmacies to determine availability and then inform the office.  The prescription was ordered 1/26/23 which was 7 days ago, patient will need to follow process per CSA.         1/25/23:   After Rescheduling patient today he wanted me to let Celina know that the Adderall XR 20 mg seems to be working well, but he did DC the Wellbutrin due to excessive restlessness.  I rescheduled patient to February 07/2023.      12/28/22:   -Continue Zoloft 50 mg by mouth daily to target anxiety.    -Continue Wellbutrin  mg po daily in the morning to target attention and concentration deficit.      Increase Adderall XR from 15 mg TO 20 mg by mouth daily in the morning to target symptoms associated with ADHD. Risks, benefits, side effects discussed with patient including elevated heart rate, elevated blood pressure, irritability, insomnia, sexual dysfunction, appetite suppressing properties, psychosis.   After discussion of these risks and benefits, the patient voiced understanding and agreed to proceed. Channing reviewed, last dispensed 15 mg 11/30/22 #30/30 days.  Informed patient order would be sent to JUAN Lang in practice, as  is out of office today, in separate entry for signature due to EMR system, and will not see as refilled on AVS today.  Adderall XR 15 mg providing inconsistent relief of ADHD symptoms, denies side effects, will increase dose today and have patient return in 3 weeks, as patient will be returning to work before 4 weeks from today.  Patient to track daily symptoms, activities, ability to complete tasks and report at next appointment.  Patient instructed to notify provider if experiencing insomnia as combination with Wellbutrin XL may effect sleep and dose may need to be lowered.  Patient to contact provider if symptoms worsen or fail to improve.       11/30/22:  Continue Zoloft 50 mg by mouth daily to target anxiety.      Continue Wellbutrin  mg po daily in the morning to target attention and concentration deficit.  Instructed patient to request refill in 2-3 weeks from pharmacy if continues to tolerate with Adderall XR, otherwise to call office and request dose decrease to 150 mg.   Increase Adderall XR from 10 mg TO 15 mg by mouth daily in the morning to target symptoms associated with ADHD. Risks, benefits, side effects discussed with patient including elevated heart rate, elevated blood pressure, irritability, insomnia, sexual dysfunction, appetite suppressing properties, psychosis.  After discussion of these risks and benefits, the patient voiced understanding and agreed to proceed. Channing reviewed, last dispensed 10 mg dose 11/3/22 #30/30 days.  Informed patient order would be sent to Dr. Singh in separate entry for signature due to EMR system, and will not see as refilled on AVS today.  Controlled substance documentation: Channing reviewed; prior urine drug screen  consistent obtained 11/3/22; consent is up to date, signed, witnessed and in EHR, dated 11/3/22, which will be updated annually per policy. Patient is aware of risk of addiction on this medication, understands need to follow up for a review every 3 months and medications will be adjusted or decreased as deemed appropriate at each visit.  No history of drug or alcohol abuse.  No concerns about diversion or abuse. Patient denies side effects related to the medication.  Patient is aware of random urine drug screens and pill counts. The dosing of this medication will be reviewed on a regular basis and reduced if possible..  Ongoing use of a controlled substance is necessary for this patient to have a normal quality of life.    Anxiety symptoms are under good control with Zoloft and Wellbutrin XL.  ADHD symptoms remain present, minimal effectiveness with 10 mg dose of Adderall XR, tolerating well without side effects, will increase dose today and send to [facility] pharmacy, patient instructed to contact either [place] office and leave vm with MA and if not heard back within 1-2 hrs to call the [place] office, or patient may call the [facility] office initially as MA is not physically in the [place] office today though checks vm.  Patient verbalized understanding. Patient to contact provider if symptoms worsen or fail to improve.     11/3/22:   Patient called to inform [facility], primary pharmacy, does not have Adderall XR 10 mg in stock, patient was instructed to call other local pharmacies to check availability.   Patient called back to tell us that he checked around and [facility] has the Adderall.  Please send there Pharmacy changed and med pended in chart    11/3/22:  Continue Zoloft 50 mg by mouth daily to target anxiety.      Continue Wellbutrin  mg po daily in the morning to target attention and concentration deficit.      Start Adderall XR 10 mg by mouth daily in the morning to target  symptoms associated with ADHD. Risks, benefits, side effects discussed with patient including elevated heart rate, elevated blood pressure, irritability, insomnia, sexual dysfunction, appetite suppressing properties, psychosis.  After discussion of these risks and benefits, the patient voiced understanding and agreed to proceed. Channing reviewed, UDS ordered, and controlled substance agreement signed & witnessed. Informed patient prescription would not be ordered until UDS results noted as negative, expected results within 30 minutes of providing sample.   POC UDS today in clinic  ADHD testing results reviewed from Dr. Eladio Dunn,[facility] dated 9/29/2022 indicating a diagnosis of RAFAEL and ADHD.     Patient to request refills for Wellbutrin XL and Zoloft in approximately 2 weeks, as patient has been educated he may need to decrease Wellbutrin XL dose due to addition of Adderall XR.  Patient to contact provider if symptoms worsen or fail to improve.     10/3/22:   -Continue Zoloft 50 mg by mouth daily to target anxiety.    -Continue Wellbutrin  mg po daily in the morning to target attention and concentration deficit.     Patient denies need for refills at this time, tolerating current medications well without reported side effects.  Patient reported completing ADHD testing last week with Dr. Eladio Dunn, report not expected for at least 2 weeks. Will plan on next appointment in 4 weeks in person due to possible stimulant medication needed, as patient will be starting an new job Nov 6 and would prefer to come in the week prior.  If ADHD report findings indicate no formal diagnosis of ADHD will contact patient prior to next appointment.  Patient to contact provider if symptoms worsen or fail to improve.       7/26/22:   Declined therapy  Continue Zoloft 50 mg by mouth daily to target anxiety.  Risks, benefits, alternatives discussed with patient including GI upset, nausea vomiting diarrhea, theoretical decrease of  seizure threshold predisposing the patient to a slightly higher seizure risk, headaches, sexual dysfunction, serotonin syndrome, bleeding risk, increased suicidality in patients 24 years and younger.  After discussion of these risks and benefits, the patient voiced understanding and agreed to proceed.  Increase Wellbutrin XL from 150 mg to 300 mg (instructed to start taking 2 tabs of the 150 mg on hand until new prescription picked up) po daily in the morning to target attention and concentration deficit. Discussed all risks, benefits, alternatives, and side effects of Bupropion/Wellbutrin including but not limited to GI upset (N/V/D, constipation), tachycardia, diaphoresis, weight loss, agitation, dizziness, headache, insomnia, tremor, blurred vision, anorexia, HTN, activation of char or hypomania, CNS stimulation and neuropsychiatric effects, ocular effects, seizure risk, withdrawal syndrome following abrupt discontinuation, and activation of suicidal ideation and behavior. Patient  educated on the need to practice safe sex while taking this med. Discussed the need for patient to immediately call the office for any new or worsening symptoms, such as worsening depression; feeling nervous or restless; suicidal thoughts or actions; or other changes in mood or behavior, and all other concerns. Patient educated on medication compliance. Patient  verbalized understanding and is agreeable to taking Bupropion/Wellbutrin. Addressed all questions and concerns.     Referral for ADHD testing.  Patient to contact provider and set up appointment.  And to contact office if unable to get an appointment scheduled. -Attached list of several other sites for ADHD testing. Patient instructed to contact providers on list to determine availability of appointments, instructed patient to take notes while calling places indicating first available appointment, and to ask to be placed on waiting list.  If an office is chosen and requests  the referral order please contact my Medical Assistant, Serene, directly at [phone] informing of chosen office and the information will be sent.    Dr. Eladio Dunn, Psychologist, MS, LPP  [address] [facility], [place], [address]  [place] [id number] [phone]   Currently only accepting referrals for psychological testing services.  Accepts Most insurance plans except Medicare Plans    Patient presentation seems most consistent with anxiety vs ADHD, due to high suspicion of ADHD will refer for formal testing.  Increased Wellbutrin XL today , will see patient back in 4 weeks.       Patient screened positive for depression based on a PHQ-9 score of 2 on 4/5/2023. Follow-up recommendations include: Prescribed antidepressant medication treatment and Suicide Risk Assessment performed.     TREATMENT PLAN/GOALS: Continue supportive psychotherapy efforts and medications as indicated. Treatment and medication options discussed during today's visit. Patient ackowledged and verbally consented to continue with current treatment plan and was educated on the importance of compliance with treatment and follow-up appointments.    MEDICATION ISSUES:  ANUSHA reviewed as expected.  Discussed medication options and treatment plan of prescribed medication as well as the risks, benefits, and side effects including potential falls, possible impaired driving and metabolic adversities among others. Patient is agreeable to call the office with any worsening of symptoms or onset of side effects. Patient is agreeable to call 911 or go to the nearest ER should he/she begin having SI/HI. No medication side effects or related complaints today.     MEDS ORDERED DURING VISIT:  No orders of the defined types were placed in this encounter.      Return in about 11 weeks (around 11/1/2023) for medication check.         I spent 16 minutes caring for Thomas on this date of service. This time includes time spent by me in the following activities:  preparing for the visit, performing a medically appropriate examination and/or evaluation, counseling and educating the patient/family/caregiver, referring and communicating with other health care professionals, documenting information in the medical record, care coordination, and scheduling .      This document has been electronically signed by JUAN Hayes  August 16, 2023 08:56 EDT      Part of this note may be an electronic transcription/translation of spoken language to printed text using the Dragon Dictation System.

## 2023-08-16 NOTE — PATIENT INSTRUCTIONS
"1. Should you want to get in touch with your provider, JUAN Hayes, please contact MY Medical Assistant, Serene, directly at [phone].  Recommend saving Serene's direct number in phone as this is the PREFERRED & EASIEST way to get in contact with your provider.  Please leave a voice mail if you do not get an answer and she will return your call within 24 hrs. You will NOT be able to contact provider on [employer], as Behavioral Health Providers are restricted. YOU MUST CALL [phone]  If you need to speak with the on call provider after hours or on weekends, please Contact the [facility] ([phone]) and staff will be able to page the provider on call directly.     2.  In the event you need to cancel an appointment, please notify the office at least 24 hrs prior:   Contact **Serene Medical Assistant at [facility] directly at [phone] or the [facility] ([phone])     3. MEDICATION REFILLS:  PLEASE CALL THE PHARMACY TO REQUEST ALL MEDICATION REFILLS or via [employer] TO ENSURE YOU ARE RECEIVING YOUR MEDICATIONS IN A TIMELY MANNER. The pharmacy or [employer] [facility] will send this request ELECTRONICALLY to the ordering provider.   IF YOU USE AN AUTOMATED SERVICE AT THE PHARMACY FOR REFILLS AND ARE TOLD THERE ARE \"NO REFILLS REMAINING\"   PLEASE CALL THE PHARMACY & SPEAK TO A LIVE PERSON TO VERIFY IT IS THE MOST UP TO DATE PRESCRIPTION ON FILE.    All new prescriptions will have a different number, therefore, if you were given refills for a medication today or at last visit it will not have the same number as the previous prescription.     4.  In the event you have personal crisis, contact the following crisis numbers: Suicide Prevention Hotline 1-606.737.7485 or *988, [facility] Helpline 0-621-558-JUUQ; [facility] Room [phone]; text HELLO to 601841; or 911.  If you feel like harming yourself or others, call 911 right away.  You can call the 989 Suicide and Crisis " "Lifeline at  988   to speak with a counselor at the [facility], or you can connect with one using their online chat  .    5.  Never stop an antidepressant medicine without first talking to a healthcare provider. Suddenly stopping this type of medication can cause withdrawal symptoms.    6.  Counseling and talk therapy  Counseling or therapy teaches you new coping skills and more adaptive ways of thinking about problems. These tools can help you make positive changes. The benefits of counseling often last long after treatment sessions have stopped.    7.   We would appreciate your feedback, please scan the QRS code on the back of your appointment card (or see below) and complete a brief survey.  [place] location is still not available, so please click \"[place]\" location.  Thank you      SPECIFIC RECOMMENDATIONS:     1.      Medications discussed at this encounter:                   -  no changes, refill for Vyvanse 40 mg sent to Dr. Singh for signature with first fill date tomorrow, 8/17/23.     2.      Psychotherapy recommendations: Declined     3.     Return to clinic: Friday 11/3/23 at 8 am for urine drug screen and annual controlled substance agreement signing    Please arrive at least 15 minutes before your scheduled appointment time to complete check in process.      IF you are scheduled for a [employer] VIDEO visit, YOU MUST COMPLETE THE \"E-CHECK IN\" PROCESS PRIOR TO BEGINNING THE VISIT, YOU WILL NO LONGER RECEIVE A PHONE CALL PRIOR TO ALL VIDEO VISITS; You may still complete the E-Check in for in office visits prior to appointment, you will receive multiple text/email reminders which will direct you further if needed.           "

## 2023-10-05 DIAGNOSIS — F90.0 ADHD (ATTENTION DEFICIT HYPERACTIVITY DISORDER), INATTENTIVE TYPE: ICD-10-CM

## 2023-10-05 RX ORDER — LISDEXAMFETAMINE DIMESYLATE CAPSULES 40 MG/1
40 CAPSULE ORAL EVERY MORNING
Qty: 30 CAPSULE | Refills: 0 | Status: SHIPPED | OUTPATIENT
Start: 2023-10-05

## 2023-10-05 NOTE — TELEPHONE ENCOUNTER
Last dispensed 9/1/23, order is ready for signature, CSA is on file dated 11/3/22, next appointment in person to resume.

## 2023-11-03 ENCOUNTER — CLINICAL SUPPORT (OUTPATIENT)
Dept: FAMILY MEDICINE CLINIC | Age: 33
End: 2023-11-03
Payer: COMMERCIAL

## 2023-11-03 ENCOUNTER — OFFICE VISIT (OUTPATIENT)
Dept: BEHAVIORAL HEALTH | Facility: CLINIC | Age: 33
End: 2023-11-03
Payer: COMMERCIAL

## 2023-11-03 VITALS
HEART RATE: 84 BPM | SYSTOLIC BLOOD PRESSURE: 120 MMHG | DIASTOLIC BLOOD PRESSURE: 76 MMHG | HEIGHT: 76 IN | BODY MASS INDEX: 22.31 KG/M2 | WEIGHT: 183.2 LBS

## 2023-11-03 DIAGNOSIS — Z79.899 CONTROLLED SUBSTANCE AGREEMENT SIGNED: ICD-10-CM

## 2023-11-03 DIAGNOSIS — F90.0 ADHD (ATTENTION DEFICIT HYPERACTIVITY DISORDER), INATTENTIVE TYPE: Primary | ICD-10-CM

## 2023-11-03 DIAGNOSIS — F90.0 ADHD (ATTENTION DEFICIT HYPERACTIVITY DISORDER), INATTENTIVE TYPE: ICD-10-CM

## 2023-11-03 DIAGNOSIS — Z79.899 HIGH RISK MEDICATION USE: ICD-10-CM

## 2023-11-03 LAB
AMPHET+METHAMPHET UR QL: POSITIVE
AMPHETAMINES UR QL: NEGATIVE
BARBITURATES UR QL SCN: NEGATIVE
BENZODIAZ UR QL SCN: NEGATIVE
BUPRENORPHINE SERPL-MCNC: NEGATIVE NG/ML
CANNABINOIDS SERPL QL: NEGATIVE
COCAINE UR QL: NEGATIVE
EXPIRATION DATE: ABNORMAL
Lab: ABNORMAL
MDMA UR QL SCN: NEGATIVE
METHADONE UR QL SCN: NEGATIVE
OPIATES UR QL: NEGATIVE
OXYCODONE UR QL SCN: NEGATIVE
PCP UR QL SCN: NEGATIVE

## 2023-11-03 RX ORDER — LISDEXAMFETAMINE DIMESYLATE CAPSULES 40 MG/1
40 CAPSULE ORAL EVERY MORNING
Qty: 90 CAPSULE | Refills: 0 | Status: SHIPPED | OUTPATIENT
Start: 2023-11-06

## 2023-11-03 NOTE — PATIENT INSTRUCTIONS
"1. Should you want to get in touch with your provider, JUAN Hayes, please contact MY Medical Assistant, Serene, directly at [phone].  Recommend saving Serene's direct number in phone as this is the PREFERRED & EASIEST way to get in contact with your provider.  Please leave a voice mail if you do not get an answer and she will return your call within 24 hrs. You will NOT be able to contact provider on [employer], as Behavioral Health Providers are restricted. YOU MUST CALL [phone]  If you need to speak with the on call provider after hours or on weekends, please Contact the [facility] ([phone]) and staff will be able to page the provider on call directly.     2.  In the event you need to cancel an appointment, please notify the office at least 24 hrs prior:   Contact **Serene Medical Assistant at [facility] directly at [phone] or the [facility] ([phone])     3. MEDICATION REFILLS:  PLEASE CALL THE PHARMACY TO REQUEST ALL MEDICATION REFILLS or via [employer] TO ENSURE YOU ARE RECEIVING YOUR MEDICATIONS IN A TIMELY MANNER. The pharmacy or [employer] [facility] will send this request ELECTRONICALLY to the ordering provider.   IF YOU USE AN AUTOMATED SERVICE AT THE PHARMACY FOR REFILLS AND ARE TOLD THERE ARE \"NO REFILLS REMAINING\"   PLEASE CALL THE PHARMACY & SPEAK TO A LIVE PERSON TO VERIFY IT IS THE MOST UP TO DATE PRESCRIPTION ON FILE.    All new prescriptions will have a different number, therefore, if you were given refills for a medication today or at last visit it will not have the same number as the previous prescription.     4.  In the event you have personal crisis, contact the following crisis numbers: Suicide Prevention Hotline 1-806.319.7768 or *988, [facility] Helpline 2-375-937-NKTY; [facility] Emergency Room [phone]; text HELLO to 025852; or 911.  If you feel like harming yourself or others, call 911 right away.  You can call the 980 Suicide and Crisis " "Lifeline at  988   to speak with a counselor at the [facility], or you can connect with one using their online chat  .    5.  Never stop an antidepressant medicine without first talking to a healthcare provider. Suddenly stopping this type of medication can cause withdrawal symptoms.    6.  Counseling and talk therapy  Counseling or therapy teaches you new coping skills and more adaptive ways of thinking about problems. These tools can help you make positive changes. The benefits of counseling often last long after treatment sessions have stopped.    7.   We would appreciate your feedback, please scan the QRS code on the back of your appointment card (or see below) and complete a brief survey.  [place] location is still not available, so please click \"[place]\" location.  Thank you      SPECIFIC RECOMMENDATIONS:     1.      Medications discussed at this encounter:                   -  Will Send refill for Michaele to Dr. Singh    2.      Psychotherapy recommendations:. Declined     3.     Return to clinic: 3 months, Friday 2/2/24 at 8 am via video    Please arrive at least 15 minutes before your scheduled appointment time to complete check in process.      IF you are scheduled for a [employer] VIDEO visit, YOU MUST COMPLETE THE \"E-CHECK IN\" PROCESS PRIOR TO BEGINNING THE VISIT, YOU WILL NO LONGER RECEIVE A PHONE CALL PRIOR TO ALL VIDEO VISITS; You may still complete the E-Check in for in office visits prior to appointment, you will receive multiple text/email reminders which will direct you further if needed.           "

## 2023-11-03 NOTE — PROGRESS NOTES
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 Final    Methadone Screen, Urine 11/03/2022 Negative  Negative Final    Opiate Screen 11/03/2022 Negative  Negative Final    Oxycodone Screen, Urine 11/03/2022 Negative  Negative Final    Phencyclidine (PCP), Urine 11/03/2022 Negative  Negative Final    THC, Screen, Urine 11/03/2022 Negative  Negative Final    Lot Number 11/03/2022 [id number]   Final    Expiration Date 11/03/2022 02/29/2024   Final       EKG Results:  No orders to display       Imaging Results:  No Images in the past 120 days found..      Assessment & Plan       Visit Diagnoses:    ICD-10-CM ICD-9-CM   1. ADHD (attention deficit hyperactivity disorder), inattentive type  F90.0 314.00   2. Controlled substance agreement signed  Z79.899 V58.69   3. High risk medication use  Z79.899 V58.69           PLAN:  1.   Safety: No acute safety concerns  Therapy: None  Risk Assessment: Risk of self-harm acutely is moderate.  Risk factors include anxiety disorder,family history, access to guns/weapons, and recent psychosocial stressors (pandemic). Protective factors include no present SI, no history of suicide attempts or self-harm in the past, minimal AODA, healthcare seeking, future orientation, willingness to engage in care.  Risk of self-harm chronically is also moderate, but could be further elevated in the event of treatment noncompliance and/or AODA.  Meds:  Continue Zoloft 50 mg by mouth daily to target anxiety and PTSD.       Continue Vyvanse 40 mg by mouth daily in the morning without food to target symptoms associated with ADHD.  Instructed patient to take on an empty stomach to avoid delayed onset of action, and may eat 20-30 minutes after taking medication, though if unable to tolerate or wait to eat to expect the onset of action to be delayed.  Risks, benefits, and alternatives discussed with patient including insomnia, headache, irritability, overstimulation, tremor, dizziness, nausea, dry mouth, constipation, diarrhea, weight loss, sexual dysfunction, heart palpitations, elevated heart rate, elevated blood pressure, seizures and suicidal ideations though rare.  After discussion of these risks patient agreed to proceed with Vyvanse. Discussed all questions and concerns.  Patient instructed to contact provider immediately with any intolerable side effects, cardiac symptoms, suicidal ideations,  or worsening anxiety. Last dispensed 10/05/23 #30/30 days. Informed patient order would be sent to Dr. Singh in separate entry for signature due to EMR system, and will not see as refilled on AVS today.  Will send in for 90 day supply with first fill date of Monday 11/6/23, patient is now able to request refills via [facility].     Controlled substance documentation: [place] reviewed; prior urine drug screen consistent obtained 11/3/22, ordered for today; consent is up to date, signed, witnessed and in EHR, dated today 11/3/23, which will be updated annually per policy. Patient is aware of risk of addiction on this medication, understands need to follow up for a review every 3 months and medications will be adjusted or decreased as deemed appropriate at each visit.  No history of drug or alcohol abuse.  No concerns about diversion or abuse. Patient denies side effects related to the medication.  Patient is aware of random urine drug screens and pill counts. The dosing of this medication will be reviewed on a regular basis and reduced if  possible. Ongoing use of a controlled substance is necessary for this patient to have a normal quality of life.  Labs: POC UDS today in clinic    Symptoms of ADHD are under good control with current medication regimen.    Patient was given instructions and counseling regarding condition and for health maintenance advice. Please see specific information pulled into the AVS if appropriate.    Patient to contact provider if symptoms worsen or fail to improve.      8/16/23:   Continue Zoloft 50 mg by mouth daily to target anxiety.      Continue Vyvanse 40 mg by mouth daily in the morning without food to target symptoms associated with ADHD.  Instructed patient to take on an empty stomach to avoid delayed onset of action, and may eat 20-30 minutes after taking medication, though if unable to tolerate or wait to eat to expect the onset of action to be delayed.  Risks, benefits, and alternatives discussed with patient including insomnia, headache, irritability, overstimulation, tremor, dizziness, nausea, dry mouth, constipation, diarrhea, weight loss, sexual dysfunction, heart palpitations, elevated heart rate, elevated blood pressure, seizures and suicidal ideations though rare.  After discussion of these risks patient agreed to proceed with Vyvanse. Discussed all questions and concerns.  Patient instructed to contact provider immediately with any intolerable side effects, cardiac symptoms, suicidal ideations,  or worsening anxiety. Last dispensed 7/19/23 #30/30 days. Informed patient order would be sent to Dr. Singh in separate entry for signature due to EMR system, and will not see as refilled on AVS today.  With first fill date of 8/17/23.     Symptoms of ADHD are under good control with current medication regimen.  Next visit will be in the office for annual CSA and UDS, scheduled for Friday Nov. 3, 2023 at 8 am.   Patient was given instructions and counseling regarding condition and for health maintenance advice.  Please see specific information pulled into the AVS if appropriate.    Patient to contact provider if symptoms worsen or fail to improve.         23:  Continue Zoloft 50 mg by mouth daily to target anxiety.  Refilled today for 90 days with 3 refills.    Continue Vyvanse 40 mg by mouth daily in the morning without food to target symptoms associated with ADHD. Last dispensed 6/15/23 #30/30 days.  Informed patient order would be sent to Dr. Lyles in separate entry for signature due to EMR system, and will not see as refilled on AVS today, as Dr. Singh is out of the office.    Symptoms of anxiety and ADHD are under good control with current medication regimen.  Discussed option for a 90 day supply once long term dose determined, as patient started 40 mg of Vyvanse 23, will see back in 4 weeks to determine if further dose adjustments are needed.  Patient informed current CSA will  in Nov. 3 And will need to be seen in office by or before Nov. 3, 2023.   Patient was given instructions and counseling regarding condition and for health maintenance advice. Please see specific information pulled into the AVS if appropriate.    Patient to contact provider if symptoms worsen or fail to improve.         23:  -Continue Zoloft 50 mg by mouth daily to target anxiety.  Due to upcoming travel to [place] and [place] from 23-23, will send in a quantity of 4 to [facility] pharmacy today, will fill for 90 days at next appointment.   -Continue Vyvanse 40 mg by mouth daily in the morning without food to target symptoms associated with ADHD.  Instructed patient to take on an empty stomach to avoid delayed onset of action, and may eat 20-30 minutes after taking medication, though if unable to tolerate or wait to eat to expect the onset of action to be delayed.  Risks, benefits, and alternatives discussed with patient including insomnia, headache, irritability, overstimulation, tremor, dizziness,  nausea, dry mouth, constipation, diarrhea, weight loss, sexual dysfunction, heart palpitations, elevated heart rate, elevated blood pressure, seizures and suicidal ideations though rare.  After discussion of these risks patient agreed to proceed with Vyvanse. Discussed all questions and concerns.  Patient instructed to contact provider immediately with any intolerable side effects, cardiac symptoms, suicidal ideations,  or worsening anxiety. Last dispensed 6/15/23 #30/30 days. Due to upcoming travel to [place] and [place] from 6/30/23-7/18/23, will send in a quantity of 4 to [facility] pharmacy today with allowed early fill.  Informed patient order would be sent to Dr. Singh in separate entry for signature due to EMR system, and will not see as refilled on AVS today.     Controlled substance documentation: [place] reviewed; prior urine drug screen consistent obtained 11/3/22; consent is up to date, signed, witnessed and in EHR, dated 11/3/22, Vyvanse consent for treatment form dated 4/12/23, which will be updated annually per policy. Patient is aware of risk of addiction on this medication, understands need to follow up for a review every 3 months and medications will be adjusted or decreased as deemed appropriate at each visit.  No history of drug or alcohol abuse.  No concerns about diversion or abuse. Patient denies side effects related to the medication.  Patient is aware of random urine drug screens and pill counts. The dosing of this medication will be reviewed on a regular basis and reduced if possible. Ongoing use of a controlled substance is necessary for this patient to have a normal quality of life.  Symptoms of ADHD and anxiety are under good control with current medication regimen. Tolerating well without reported side effects.   Patient to contact provider if symptoms worsen or fail to improve.        5/12/23:  Symptoms of ADHD are under fair control with Vyvanse 30 mg, will increase dose  today to 40 mg. Last dispensed 4/12/23 #30/30 days. Tolerating well without reported side effects.   Patient to contact provider if symptoms worsen or fail to improve.        4/12/23:   -Continue Zoloft 50 mg by mouth daily to target anxiety.    -Stop Azstarys (Serdexmethylphenidate and dexmethylphenidate) 26.1 mg-5.2 mg due to cost of medication.   -Start  Vyvanse 30 mg by mouth daily in the morning without food to target symptoms associated with ADHD.  Instructed patient to take on an empty stomach to avoid delayed onset of action, and may eat 20-30 minutes after taking medication, though if unable to tolerate or wait to eat to expect the onset of action to be delayed.  Risks, benefits, and alternatives discussed with patient including insomnia, headache, irritability, overstimulation, tremor, dizziness, nausea, dry mouth, constipation, diarrhea, weight loss, sexual dysfunction, heart palpitations, elevated heart rate, elevated blood pressure, seizures and suicidal ideations though rare.  After discussion of these risks patient agreed to proceed with Vyvanse. Discussed all questions and concerns.  Patient instructed to contact provider immediately with any intolerable side effects, cardiac symptoms, suicidal ideations,  or worsening anxiety.     Informed patient order would be sent to Dr. Singh in separate entry for signature due to EMR system, and will not see as refilled on AVS today.  Instructed patient to come by [place] office today as he planned to sign medication portion controlled substance for Azstarys and Vyvanse.     Symptoms of ADHD are not under good control with Azstarys and due to high cost patient wishes to switch to Vyvanse.   Contacted [place] office staff and instructed staff to place another controlled substance consent to treat with Vyvanse and place in provider box for patient to sign both forms today.  Will send Vyvanse to [facility] as patient is going there today. Patient to  contact provider if symptoms worsen or fail to improve.     3/15/23:  Continue Zoloft 50 mg by mouth daily to target anxiety.    -Discontinue Adderall XR 20 mg due to nationwide shortage patient has not been able to locate a pharmacy that has medication in stock, and has not had medication for 1 month.  last dispensed 12/28/22 #30/30 days.      Start Azstarys (Serdexmethylphenidate and dexmethylphenidate) 26.1 mg-5.2 mg by mouth daily in the morning on an empty stomach (may eat in 20-30 minutes to help with absorption) to target symptoms associated with ADHD. Risks, benefits, side effects discussed with patient including elevated heart rate, elevated blood pressure, irritability, insomnia, sexual dysfunction, appetite suppressing properties, psychosis. After discussion of these risks and benefits, the patient voiced understanding and agreed to proceed. Informed patient order would be sent to Dr. Singh in separate entry for signature due to EMR system, and will not see as refilled on AVS today.  Instructed patient to come by [facility] within the next week to sign medication portion controlled substance for Azstarys.     Due to nationwide shortage of Adderall, discussed various options for ADHD treatment, patient willing to switch to alternate medication. Symptoms of ADHD remain present as patient has not had medication for at least 1 month.  Patient to contact provider if unable to  medication by tomorrow, given direct contact number for MA at [facility].  Patient to contact provider if symptoms worsen or fail to improve.       2/9/23:   Your request has been approved  Your PA request has been approved. Additional information will be provided in the approval communication. **LPN Reviewed- Complete Clinical**COT, paid claim//MDO verified information// Last paid claim 12-// Adderall XR 20 MG #30/30ds//F90.0 Attn-defct hyperactivity disorder, predom inattentive type//75 Prior Authorization  "Reqrd +MUST Use generics, AZSTARYS, JORNAY PM, MYDAYIS, VYVANSE MEDICAL NECESSITY PA ONLY [phone] Drug Requires Prior Authorization// Approve, 36 months. -TC, LPN       ADDERALL XR 20MG            11:56 AM    Serene Salazar MA contacted Vicenta Lei \"Renard\"    Serene Salazar MA         11:58 AM  Note    Called patient to let him know that we have received the approval for his Adderall XR 20mg        2/7/23:  Continue Zoloft 50 mg by mouth daily to target anxiety.      Continue Adderall XR 20 mg by mouth daily in the morning to target symptoms associated with ADHD. Risks, benefits, side effects discussed with patient including elevated heart rate, elevated blood pressure, irritability, insomnia, sexual dysfunction, appetite suppressing properties, psychosis.  After discussion of these risks and benefits, the patient voiced understanding and agreed to proceed. Channing reviewed, last dispensed 12/28/22 #30/30 days.  New order for 20 mg dose sent 1/26/23 to [facility] pharmacy due to [facility] pharmacy out of stock, however, awaiting PA from insurance at this time.     Patient noted with improvement of ADHD symptoms with dose increase of Adderall XR to 20 mg, however, PA is required which was reviewed with patient during visit.  Patient given detailed information to follow up with insurance company to determine specifics regarding which and how many medications have to be tried before restarting current dose of Adderall and contact office to MA directly later today.  Patient was not allowed option to pay out of pocket from pharmacy.  Will be in touch with patient later today.        2/6/23:  VICENTA LEI Key: [id number] - PA Case ID: [id number] - Rx #: [id number]  PA for Adderall XR 20mg sent to plan 02/06/2023 awaiting decision from insurance company      2/2/23: ReplySendhart message from patient wife to PCP which was directed to this provider:   This message is being sent by Cindi Lei on behalf of " Thomas Lei.   Hi Dr. Haskins,     This is Cindi Lei on Thomas Lei account. I am trying to  his medication, the generic Adderral and they are saying the extended release capsules are on backorder. The only thing the have in stock are non extended release tablets. Are you able to switch the prescription to that so it can be filled until the other kind comes in? [facility] Thank you!  Patient is aware that communication with behavioral health has to be done via telephone, and when the pharmacy does not have supply in stock, as this has happened before, he is to contact other pharmacies to determine availability and then inform the office.  The prescription was ordered 1/26/23 which was 7 days ago, patient will need to follow process per CSA.         1/25/23:   After Rescheduling patient today he wanted me to let Celina know that the Adderall XR 20 mg seems to be working well, but he did DC the Wellbutrin due to excessive restlessness.  I rescheduled patient to February 07/2023.      12/28/22:   -Continue Zoloft 50 mg by mouth daily to target anxiety.    -Continue Wellbutrin  mg po daily in the morning to target attention and concentration deficit.      Increase Adderall XR from 15 mg TO 20 mg by mouth daily in the morning to target symptoms associated with ADHD. Risks, benefits, side effects discussed with patient including elevated heart rate, elevated blood pressure, irritability, insomnia, sexual dysfunction, appetite suppressing properties, psychosis.  After discussion of these risks and benefits, the patient voiced understanding and agreed to proceed. Channing reviewed, last dispensed 15 mg 11/30/22 #30/30 days.  Informed patient order would be sent to JUAN Lang in practice, as  is out of office today, in separate entry for signature due to EMR system, and will not see as refilled on AVS today.  Adderall XR 15 mg providing inconsistent relief of ADHD  symptoms, denies side effects, will increase dose today and have patient return in 3 weeks, as patient will be returning to work before 4 weeks from today.  Patient to track daily symptoms, activities, ability to complete tasks and report at next appointment.  Patient instructed to notify provider if experiencing insomnia as combination with Wellbutrin XL may effect sleep and dose may need to be lowered.  Patient to contact provider if symptoms worsen or fail to improve.       11/30/22:  Continue Zoloft 50 mg by mouth daily to target anxiety.      Continue Wellbutrin  mg po daily in the morning to target attention and concentration deficit.  Instructed patient to request refill in 2-3 weeks from pharmacy if continues to tolerate with Adderall XR, otherwise to call office and request dose decrease to 150 mg.   Increase Adderall XR from 10 mg TO 15 mg by mouth daily in the morning to target symptoms associated with ADHD. Risks, benefits, side effects discussed with patient including elevated heart rate, elevated blood pressure, irritability, insomnia, sexual dysfunction, appetite suppressing properties, psychosis.  After discussion of these risks and benefits, the patient voiced understanding and agreed to proceed. Channing reviewed, last dispensed 10 mg dose 11/3/22 #30/30 days.  Informed patient order would be sent to Dr. Singh in separate entry for signature due to EMR system, and will not see as refilled on AVS today.  Controlled substance documentation: [place] reviewed; prior urine drug screen consistent obtained 11/3/22; consent is up to date, signed, witnessed and in EHR, dated 11/3/22, which will be updated annually per policy. Patient is aware of risk of addiction on this medication, understands need to follow up for a review every 3 months and medications will be adjusted or decreased as deemed appropriate at each visit.  No history of drug or alcohol abuse.  No concerns about diversion or abuse. Patient  denies side effects related to the medication.  Patient is aware of random urine drug screens and pill counts. The dosing of this medication will be reviewed on a regular basis and reduced if possible..  Ongoing use of a controlled substance is necessary for this patient to have a normal quality of life.    Anxiety symptoms are under good control with Zoloft and Wellbutrin XL.  ADHD symptoms remain present, minimal effectiveness with 10 mg dose of Adderall XR, tolerating well without side effects, will increase dose today and send to [facility] pharmacy, patient instructed to contact either [place] office and leave vm with MA and if not heard back within 1-2 hrs to call the [place] office, or patient may call the [facility] office initially as MA is not physically in the [place] office today though checks vm.  Patient verbalized understanding. Patient to contact provider if symptoms worsen or fail to improve.     11/3/22:   Patient called to inform [facility], primary pharmacy, does not have Adderall XR 10 mg in stock, patient was instructed to call other local pharmacies to check availability.   Patient called back to tell us that he checked around and [facility] has the Adderall.  Please send there Pharmacy changed and med pended in chart    11/3/22:  Continue Zoloft 50 mg by mouth daily to target anxiety.      Continue Wellbutrin  mg po daily in the morning to target attention and concentration deficit.      Start Adderall XR 10 mg by mouth daily in the morning to target symptoms associated with ADHD. Risks, benefits, side effects discussed with patient including elevated heart rate, elevated blood pressure, irritability, insomnia, sexual dysfunction, appetite suppressing properties, psychosis.  After discussion of these risks and benefits, the patient voiced understanding and agreed to proceed. [place]anning reviewed, UDS ordered, and controlled substance agreement signed & witnessed. Informed  patient prescription would not be ordered until UDS results noted as negative, expected results within 30 minutes of providing sample.   POC UDS today in clinic  ADHD testing results reviewed from Dr. Eladio Dunn,[facility] dated 9/29/2022 indicating a diagnosis of RAFAEL and ADHD.     Patient to request refills for Wellbutrin XL and Zoloft in approximately 2 weeks, as patient has been educated he may need to decrease Wellbutrin XL dose due to addition of Adderall XR.  Patient to contact provider if symptoms worsen or fail to improve.     10/3/22:   -Continue Zoloft 50 mg by mouth daily to target anxiety.    -Continue Wellbutrin  mg po daily in the morning to target attention and concentration deficit.     Patient denies need for refills at this time, tolerating current medications well without reported side effects.  Patient reported completing ADHD testing last week with Dr. Eladio Dunn, report not expected for at least 2 weeks. Will plan on next appointment in 4 weeks in person due to possible stimulant medication needed, as patient will be starting an new job Nov 6 and would prefer to come in the week prior.  If ADHD report findings indicate no formal diagnosis of ADHD will contact patient prior to next appointment.  Patient to contact provider if symptoms worsen or fail to improve.       7/26/22:   Declined therapy  Continue Zoloft 50 mg by mouth daily to target anxiety.  Risks, benefits, alternatives discussed with patient including GI upset, nausea vomiting diarrhea, theoretical decrease of seizure threshold predisposing the patient to a slightly higher seizure risk, headaches, sexual dysfunction, serotonin syndrome, bleeding risk, increased suicidality in patients 24 years and younger.  After discussion of these risks and benefits, the patient voiced understanding and agreed to proceed.  Increase Wellbutrin XL from 150 mg to 300 mg (instructed to start taking 2 tabs of the 150 mg on hand until new  prescription picked up) po daily in the morning to target attention and concentration deficit. Discussed all risks, benefits, alternatives, and side effects of Bupropion/Wellbutrin including but not limited to GI upset (N/V/D, constipation), tachycardia, diaphoresis, weight loss, agitation, dizziness, headache, insomnia, tremor, blurred vision, anorexia, HTN, activation of char or hypomania, CNS stimulation and neuropsychiatric effects, ocular effects, seizure risk, withdrawal syndrome following abrupt discontinuation, and activation of suicidal ideation and behavior. Patient  educated on the need to practice safe sex while taking this med. Discussed the need for patient to immediately call the office for any new or worsening symptoms, such as worsening depression; feeling nervous or restless; suicidal thoughts or actions; or other changes in mood or behavior, and all other concerns. Patient educated on medication compliance. Patient  verbalized understanding and is agreeable to taking Bupropion/Wellbutrin. Addressed all questions and concerns.     Referral for ADHD testing.  Patient to contact provider and set up appointment.  And to contact office if unable to get an appointment scheduled. -Attached list of several other sites for ADHD testing. Patient instructed to contact providers on list to determine availability of appointments, instructed patient to take notes while calling places indicating first available appointment, and to ask to be placed on waiting list.  If an office is chosen and requests the referral order please contact my Medical Assistant, Serene, directly at [phone] informing of chosen office and the information will be sent.    Dr. Eladio Dunn, Psychologist, MS, LPP  [address], [address]  [place] [address] [phone]   Currently only accepting referrals for psychological testing services.  Accepts Most insurance plans except Medicare Plans    Patient presentation  seems most consistent with anxiety vs ADHD, due to high suspicion of ADHD will refer for formal testing.  Increased Wellbutrin XL today , will see patient back in 4 weeks.       Patient screened positive for depression based on a PHQ-9 score of 2 on 4/5/2023. Follow-up recommendations include: Prescribed antidepressant medication treatment and Suicide Risk Assessment performed.     TREATMENT PLAN/GOALS: Continue supportive psychotherapy efforts and medications as indicated. Treatment and medication options discussed during today's visit. Patient ackowledged and verbally consented to continue with current treatment plan and was educated on the importance of compliance with treatment and follow-up appointments.    MEDICATION ISSUES:  ANUSHA reviewed as expected.  Discussed medication options and treatment plan of prescribed medication as well as the risks, benefits, and side effects including potential falls, possible impaired driving and metabolic adversities among others. Patient is agreeable to call the office with any worsening of symptoms or onset of side effects. Patient is agreeable to call 911 or go to the nearest ER should he/she begin having SI/HI. No medication side effects or related complaints today.     MEDS ORDERED DURING VISIT:  No orders of the defined types were placed in this encounter.      Return in about 3 months (around 2/3/2024) for Video visit, medication check.         I spent 26 minutes caring for Thomas on this date of service. This time includes time spent by me in the following activities: preparing for the visit, performing a medically appropriate examination and/or evaluation, counseling and educating the patient/family/caregiver, ordering medications, tests, or procedures, referring and communicating with other health care professionals, documenting information in the medical record, care coordination, and scheduling .      This document has been electronically signed by Celina Barclay  APRN  November 3, 2023 08:28 EDT      Part of this note may be an electronic transcription/translation of spoken language to printed text using the Dragon Dictation System.

## 2023-12-15 DIAGNOSIS — F90.0 ADHD (ATTENTION DEFICIT HYPERACTIVITY DISORDER), INATTENTIVE TYPE: ICD-10-CM

## 2023-12-15 RX ORDER — LISDEXAMFETAMINE DIMESYLATE CAPSULES 40 MG/1
40 CAPSULE ORAL EVERY MORNING
Qty: 30 CAPSULE | Refills: 0 | Status: SHIPPED | OUTPATIENT
Start: 2023-12-15 | End: 2024-01-14

## 2023-12-15 NOTE — TELEPHONE ENCOUNTER
Patient is requesting a refill on his Vyvanse 40mg.  Patient is not sure if [facility] here has it or not so he would like it to go to [facility] this time but only 30 day supply his insurance won't pay for 90 days worth.  Med pended Please review

## 2024-01-26 DIAGNOSIS — F90.0 ADHD (ATTENTION DEFICIT HYPERACTIVITY DISORDER), INATTENTIVE TYPE: ICD-10-CM

## 2024-01-26 RX ORDER — LISDEXAMFETAMINE DIMESYLATE CAPSULES 40 MG/1
40 CAPSULE ORAL EVERY MORNING
Qty: 30 CAPSULE | Refills: 0 | Status: SHIPPED | OUTPATIENT
Start: 2024-01-27 | End: 2024-02-26

## 2024-01-26 NOTE — TELEPHONE ENCOUNTER
Med Refill Request. Patient would like it moved to [facility] here in [place].  Med pended pharmacy changed to reflect [facility].  Please double check pharmacy before sending in.  Patient has appointment scheduled for 02/02/2024.  Please review

## 2024-02-02 ENCOUNTER — TELEMEDICINE (OUTPATIENT)
Dept: BEHAVIORAL HEALTH | Facility: CLINIC | Age: 34
End: 2024-02-02
Payer: COMMERCIAL

## 2024-02-02 DIAGNOSIS — Z79.899 MEDICATION MANAGEMENT: ICD-10-CM

## 2024-02-02 DIAGNOSIS — F41.1 GENERALIZED ANXIETY DISORDER: ICD-10-CM

## 2024-02-02 DIAGNOSIS — F90.0 ADHD (ATTENTION DEFICIT HYPERACTIVITY DISORDER), INATTENTIVE TYPE: Primary | ICD-10-CM

## 2024-02-02 NOTE — PATIENT INSTRUCTIONS
"     SPECIFIC RECOMMENDATIONS:     1.      Medications discussed at this encounter:                   -  Patient instructed to request refills when appropriate, via  pharmacy or via "[employer]hart.       2.      Psychotherapy recommendations:  Declined     3.     Return to clinic: 3 months, Thurs. 5/2/24 at 8 am via video    Please arrive at least 15 minutes before your scheduled appointment time to complete check in process.      IF you are scheduled for a "[employer]hart VIDEO visit, YOU MUST COMPLETE THE \"E-CHECK IN\" PROCESS PRIOR TO BEGINNING THE VISIT, YOU WILL NO LONGER RECEIVE A PHONE CALL PRIOR TO ALL VIDEO VISITS; You may still complete the E-Check in for in office visits prior to appointment, you will receive multiple text/email reminders which will direct you further if needed.           "

## 2024-02-02 NOTE — PROGRESS NOTES
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 Process:  Goal directed  Thought Content:  Mood congruent  Suicidal:  None  Homicidal:  None  Hallucinations:  None  Delusion:  None  Memory:  Intact  Orientation:  Person, Place, Time and Situation  Reliability:  good  Insight:  Good  Judgement:  Good  Impulse Control:  Good  Physical/Medical Issues:  Yes Esophageal Dysphagia      Review of Systems:  Review of Systems   Constitutional:  Negative for diaphoresis and fatigue.   HENT:  Positive for hearing loss and tinnitus. Negative for drooling.         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   Respiratory:  Negative for cough and shortness of breath.    Cardiovascular:  Negative for chest pain, palpitations and leg swelling.   Gastrointestinal:  Negative for nausea and vomiting.   Endocrine: Negative for cold intolerance and heat intolerance.   Genitourinary:  Negative for difficulty urinating.   Musculoskeletal:  Negative for joint swelling.   Allergic/Immunologic: Negative for immunocompromised state.   Neurological:  Negative for dizziness, tremors, seizures, speech difficulty and numbness.   Psychiatric/Behavioral:  Negative for decreased concentration, hallucinations, self-injury, sleep disturbance and suicidal ideas. The patient is not nervous/anxious and is not hyperactive.          Physical Exam:  Physical Exam  Psychiatric:         Attention and Perception: Attention  and perception normal.         Mood and Affect: Mood and affect normal.         Speech: Speech normal.         Behavior: Behavior normal. Behavior is cooperative.         Thought Content: Thought content normal. Thought content does not include suicidal ideation. Thought content does not include suicidal plan.         Cognition and Memory: Cognition and memory normal.         Judgment: Judgment normal.         Vital Signs:   There were no vitals taken for this visit.     Lab Results:   Clinical Support on 11/03/2023   Component Date Value Ref Range Status    Amphetamine Screen, Urine 11/03/2023 Positive (A)  Negative Final    Barbiturates Screen, Urine 11/03/2023 Negative  Negative Final    Buprenorphine, Screen, Urine 11/03/2023 Negative  Negative Final    Benzodiazepine Screen, Urine 11/03/2023 Negative  Negative Final    Cocaine Screen, Urine 11/03/2023 Negative  Negative Final    MDMA (ECSTASY) 11/03/2023 Negative  Negative Final    Methamphetamine, Ur 11/03/2023 Negative  Negative Final    Methadone Screen, Urine 11/03/2023 Negative  Negative Final    Opiate Screen 11/03/2023 Negative  Negative Final    Oxycodone Screen, Urine 11/03/2023 Negative  Negative Final    Phencyclidine (PCP), Urine 11/03/2023 Negative  Negative Final    THC, Screen, Urine 11/03/2023 Negative  Negative Final    Lot Number 11/03/2023 [id number]   Final    Expiration Date 11/03/2023 09/14/24   Final       EKG Results:  No orders to display       Imaging Results:  No Images in the past 120 days found..      Assessment & Plan       Visit Diagnoses:    ICD-10-CM ICD-9-CM   1. ADHD (attention deficit hyperactivity disorder), inattentive type  F90.0 314.00   2. Generalized anxiety disorder  F41.1 300.02   3. Medication management  Z79.899 V58.69             PLAN:  1.   Safety: No acute safety concerns  Therapy: None  Risk Assessment: Risk of self-harm acutely is moderate.  Risk factors include anxiety disorder,family history, access to  guns/weapons, and recent psychosocial stressors (pandemic). Protective factors include no present SI, no history of suicide attempts or self-harm in the past, minimal AODA, healthcare seeking, future orientation, willingness to engage in care.  Risk of self-harm chronically is also moderate, but could be further elevated in the event of treatment noncompliance and/or AODA.  Meds:  Continue Zoloft 50 mg by mouth daily to target anxiety and PTSD.      Continue Vyvanse 40 mg by mouth daily in the morning without food to target symptoms associated with ADHD.  Instructed patient to take on an empty stomach to avoid delayed onset of action, and may eat 20-30 minutes after taking medication, though if unable to tolerate or wait to eat to expect the onset of action to be delayed.  Risks, benefits, and alternatives discussed with patient including insomnia, headache, irritability, overstimulation, tremor, dizziness, nausea, dry mouth, constipation, diarrhea, weight loss, sexual dysfunction, heart palpitations, elevated heart rate, elevated blood pressure, seizures and suicidal ideations though rare.  After discussion of these risks patient agreed to proceed with Vyvanse. Discussed all questions and concerns.  Patient instructed to contact provider immediately with any intolerable side effects, cardiac symptoms, suicidal ideations,  or worsening anxiety. Last dispensed 1/26/24 #30/30 days. Patient to request refill when needed via [employer] ( [employer] ).   Controlled substance documentation: [place] reviewed; prior urine drug screen consistent obtained 11/3/23; consent is up to date, signed, witnessed and in EHR, dated today 11/3/23, which will be updated annually per policy. Patient is aware of risk of addiction on this medication, understands need to follow up for a review every 3 months and medications will be adjusted or decreased as deemed appropriate at each visit.  No history of drug or alcohol abuse.  No concerns about diversion or  abuse. Patient denies side effects related to the medication.  Patient is aware of random urine drug screens and pill counts. The dosing of this medication will be reviewed on a regular basis and reduced if possible. Ongoing use of a controlled substance is necessary for this patient to have a normal quality of life.  Labs: n/a    Symptoms of anxiety and ADHD are under good control with current medication regimen.  Updated pharmacy to [facility] per patient request which is mail order, secure email sent to patient email which was verified for patient to inform provider of whether or not the mail order pharmacy requires 90 days for the Vyvanse, and how soon the new prescription needs to be sent to ensure no missed doses and the turn around time, as patient was instructed to contact them and get back to provider. Will supply 90 days if pharmacy will fill as in the past the local pharmacy would not fill due to nationwide shortages.   Patient was given instructions and counseling regarding condition and for health maintenance advice. Please see specific information pulled into the AVS if appropriate.    Patient to contact provider if symptoms worsen or fail to improve.        11/3/23:  -Continue Zoloft 50 mg by mouth daily to target anxiety and PTSD.    -Continue Vyvanse 40 mg by mouth daily in the morning without food to target symptoms associated with ADHD.  Instructed patient to take on an empty stomach to avoid delayed onset of action, and may eat 20-30 minutes after taking medication, though if unable to tolerate or wait to eat to expect the onset of action to be delayed.  Risks, benefits, and alternatives discussed with patient including insomnia, headache, irritability, overstimulation, tremor, dizziness, nausea, dry mouth, constipation, diarrhea, weight loss, sexual dysfunction, heart palpitations, elevated heart rate, elevated blood pressure, seizures and suicidal ideations though rare.  After discussion of  these risks patient agreed to proceed with Vyvanse. Discussed all questions and concerns.  Patient instructed to contact provider immediately with any intolerable side effects, cardiac symptoms, suicidal ideations,  or worsening anxiety. Last dispensed 10/05/23 #30/30 days. Informed patient order would be sent to Dr. Singh in separate entry for signature due to EMR system, and will not see as refilled on AVS today.  Will send in for 90 day supply with first fill date of Monday 11/6/23, patient is now able to request refills via [employer].     Controlled substance documentation: [place] reviewed; prior urine drug screen consistent obtained 11/3/22, ordered for today; consent is up to date, signed, witnessed and in EHR, dated today 11/3/23, which will be updated annually per policy. Patient is aware of risk of addiction on this medication, understands need to follow up for a review every 3 months and medications will be adjusted or decreased as deemed appropriate at each visit.  No history of drug or alcohol abuse.  No concerns about diversion or abuse. Patient denies side effects related to the medication.  Patient is aware of random urine drug screens and pill counts. The dosing of this medication will be reviewed on a regular basis and reduced if possible. Ongoing use of a controlled substance is necessary for this patient to have a normal quality of life.  Labs: POC UDS today in clinic  Symptoms of ADHD are under good control with current medication regimen.    Patient was given instructions and counseling regarding condition and for health maintenance advice. Please see specific information pulled into the AVS if appropriate.    Patient to contact provider if symptoms worsen or fail to improve.      8/16/23:   Continue Zoloft 50 mg by mouth daily to target anxiety.      Continue Vyvanse 40 mg by mouth daily in the morning without food to target symptoms associated with ADHD.  Instructed patient to take on an empty  stomach to avoid delayed onset of action, and may eat 20-30 minutes after taking medication, though if unable to tolerate or wait to eat to expect the onset of action to be delayed.  Risks, benefits, and alternatives discussed with patient including insomnia, headache, irritability, overstimulation, tremor, dizziness, nausea, dry mouth, constipation, diarrhea, weight loss, sexual dysfunction, heart palpitations, elevated heart rate, elevated blood pressure, seizures and suicidal ideations though rare.  After discussion of these risks patient agreed to proceed with Vyvanse. Discussed all questions and concerns.  Patient instructed to contact provider immediately with any intolerable side effects, cardiac symptoms, suicidal ideations,  or worsening anxiety. Last dispensed 7/19/23 #30/30 days. Informed patient order would be sent to Dr. Singh in separate entry for signature due to EMR system, and will not see as refilled on AVS today.  With first fill date of 8/17/23.     Symptoms of ADHD are under good control with current medication regimen.  Next visit will be in the office for annual CSA and UDS, scheduled for Friday Nov. 3, 2023 at 8 am.   Patient was given instructions and counseling regarding condition and for health maintenance advice. Please see specific information pulled into the AVS if appropriate.    Patient to contact provider if symptoms worsen or fail to improve.         7/19/23:  Continue Zoloft 50 mg by mouth daily to target anxiety.  Refilled today for 90 days with 3 refills.    Continue Vyvanse 40 mg by mouth daily in the morning without food to target symptoms associated with ADHD. Last dispensed 6/15/23 #30/30 days.  Informed patient order would be sent to Dr. Lyles in separate entry for signature due to EMR system, and will not see as refilled on AVS today, as Dr. Singh is out of the office.    Symptoms of anxiety and ADHD are under good control with current medication regimen.  Discussed  option for a 90 day supply once long term dose determined, as patient started 40 mg of Vyvanse 23, will see back in 4 weeks to determine if further dose adjustments are needed.  Patient informed current CSA will  in Nov. 3 And will need to be seen in office by or before Nov. 3, 2023.   Patient was given instructions and counseling regarding condition and for health maintenance advice. Please see specific information pulled into the AVS if appropriate.    Patient to contact provider if symptoms worsen or fail to improve.         23:  -Continue Zoloft 50 mg by mouth daily to target anxiety.  Due to upcoming travel to [place] and [place] from 23-23, will send in a quantity of 4 to [facility] pharmacy today, will fill for 90 days at next appointment.   -Continue Vyvanse 40 mg by mouth daily in the morning without food to target symptoms associated with ADHD.  Instructed patient to take on an empty stomach to avoid delayed onset of action, and may eat 20-30 minutes after taking medication, though if unable to tolerate or wait to eat to expect the onset of action to be delayed.  Risks, benefits, and alternatives discussed with patient including insomnia, headache, irritability, overstimulation, tremor, dizziness, nausea, dry mouth, constipation, diarrhea, weight loss, sexual dysfunction, heart palpitations, elevated heart rate, elevated blood pressure, seizures and suicidal ideations though rare.  After discussion of these risks patient agreed to proceed with Vyvanse. Discussed all questions and concerns.  Patient instructed to contact provider immediately with any intolerable side effects, cardiac symptoms, suicidal ideations,  or worsening anxiety. Last dispensed 6/15/23 #30/30 days. Due to upcoming travel to [place] and [place] from 23-23, will send in a quantity of 4 to [facility] today with allowed early fill.  Informed patient order would be sent  to Dr. Singh in separate entry for signature due to EMR system, and will not see as refilled on AVS today.     Controlled substance documentation: [place] reviewed; prior urine drug screen consistent obtained 11/3/22; consent is up to date, signed, witnessed and in EHR, dated 11/3/22, Vyvanse consent for treatment form dated 4/12/23, which will be updated annually per policy. Patient is aware of risk of addiction on this medication, understands need to follow up for a review every 3 months and medications will be adjusted or decreased as deemed appropriate at each visit.  No history of drug or alcohol abuse.  No concerns about diversion or abuse. Patient denies side effects related to the medication.  Patient is aware of random urine drug screens and pill counts. The dosing of this medication will be reviewed on a regular basis and reduced if possible. Ongoing use of a controlled substance is necessary for this patient to have a normal quality of life.  Symptoms of ADHD and anxiety are under good control with current medication regimen. Tolerating well without reported side effects.   Patient to contact provider if symptoms worsen or fail to improve.        5/12/23:  Symptoms of ADHD are under fair control with Vyvanse 30 mg, will increase dose today to 40 mg. Last dispensed 4/12/23 #30/30 days. Tolerating well without reported side effects.   Patient to contact provider if symptoms worsen or fail to improve.        4/12/23:   -Continue Zoloft 50 mg by mouth daily to target anxiety.    -Stop Azstarys (Serdexmethylphenidate and dexmethylphenidate) 26.1 mg-5.2 mg due to cost of medication.   -Start  Vyvanse 30 mg by mouth daily in the morning without food to target symptoms associated with ADHD.  Instructed patient to take on an empty stomach to avoid delayed onset of action, and may eat 20-30 minutes after taking medication, though if unable to tolerate or wait to eat to expect the onset of action to be delayed.   Risks, benefits, and alternatives discussed with patient including insomnia, headache, irritability, overstimulation, tremor, dizziness, nausea, dry mouth, constipation, diarrhea, weight loss, sexual dysfunction, heart palpitations, elevated heart rate, elevated blood pressure, seizures and suicidal ideations though rare.  After discussion of these risks patient agreed to proceed with Vyvanse. Discussed all questions and concerns.  Patient instructed to contact provider immediately with any intolerable side effects, cardiac symptoms, suicidal ideations,  or worsening anxiety.     Informed patient order would be sent to Dr. Singh in separate entry for signature due to EMR system, and will not see as refilled on AVS today.  Instructed patient to come by [place] office today as he planned to sign medication portion controlled substance for Azstarys and Vyvanse.     Symptoms of ADHD are not under good control with Azstarys and due to high cost patient wishes to switch to Vyvanse.   Contacted [place] office staff and instructed staff to place another controlled substance consent to treat with Vyvanse and place in provider box for patient to sign both forms today.  Will send Vyvanse to [facility] as patient is going there today. Patient to contact provider if symptoms worsen or fail to improve.     3/15/23:  Continue Zoloft 50 mg by mouth daily to target anxiety.    -Discontinue Adderall XR 20 mg due to nationwide shortage patient has not been able to locate a pharmacy that has medication in stock, and has not had medication for 1 month.  last dispensed 12/28/22 #30/30 days.      Start Azstarys (Serdexmethylphenidate and dexmethylphenidate) 26.1 mg-5.2 mg by mouth daily in the morning on an empty stomach (may eat in 20-30 minutes to help with absorption) to target symptoms associated with ADHD. Risks, benefits, side effects discussed with patient including elevated heart rate, elevated blood pressure,  "irritability, insomnia, sexual dysfunction, appetite suppressing properties, psychosis. After discussion of these risks and benefits, the patient voiced understanding and agreed to proceed. Informed patient order would be sent to Dr. Singh in separate entry for signature due to EMR system, and will not see as refilled on AVS today.  Instructed patient to come by [facility] within the next week to sign medication portion controlled substance for Azstarys.     Due to nationwide shortage of Adderall, discussed various options for ADHD treatment, patient willing to switch to alternate medication. Symptoms of ADHD remain present as patient has not had medication for at least 1 month.  Patient to contact provider if unable to  medication by tomorrow, given direct contact number for MA at [facility].  Patient to contact provider if symptoms worsen or fail to improve.       2/9/23:   Your request has been approved  Your PA request has been approved. Additional information will be provided in the approval communication. **LPN Reviewed- Complete Clinical**COT, paid claim//MDO verified information// Last paid claim 12-// Adderall XR 20 MG #30/30ds//F90.0 Attn-defct hyperactivity disorder, predom inattentive type//75 Prior Authorization Reqrd +MUST Use generics, AZSTARYS, JORNAY PM, MYDAYIS, VYVANSE MEDICAL NECESSITY PA ONLY [phone] Drug Requires Prior Authorization// Approve, 36 months. -TC, LPN       ADDERALL XR 20MG            11:56 AM    Serene Salazar MA contacted Thomas Lei \"Renard\"    Serene Salazar MA         11:58 AM  Note    Called patient to let him know that we have received the approval for his Adderall XR 20mg        2/7/23:  Continue Zoloft 50 mg by mouth daily to target anxiety.      Continue Adderall XR 20 mg by mouth daily in the morning to target symptoms associated with ADHD. Risks, benefits, side effects discussed with patient including elevated heart rate, " elevated blood pressure, irritability, insomnia, sexual dysfunction, appetite suppressing properties, psychosis.  After discussion of these risks and benefits, the patient voiced understanding and agreed to proceed. [place] reviewed, last dispensed 12/28/22 #30/30 days.  New order for 20 mg dose sent 1/26/23 to [facility] pharmacy due to [facility] pharmacy out of stock, however, awaiting PA from insurance at this time.     Patient noted with improvement of ADHD symptoms with dose increase of Adderall XR to 20 mg, however, PA is required which was reviewed with patient during visit.  Patient given detailed information to follow up with insurance company to determine specifics regarding which and how many medications have to be tried before restarting current dose of Adderall and contact office to MA directly later today.  Patient was not allowed option to pay out of pocket from pharmacy.  Will be in touch with patient later today.        2/6/23:  VICENTA LEI Key: [id number] - PA Case ID: [id number] - Rx #: [id number]  PA for Adderall XR 20mg sent to plan 02/06/2023 awaiting decision from insurance company      2/2/23: [employer] message from patient wife to PCP which was directed to this provider:   This message is being sent by Cindi Lei on behalf of Vicenta Lei.   Hi Dr. Haskins,     This is Cindi Lei on Vicenta Lei account. I am trying to  his medication, the generic Adderral and they are saying the extended release capsules are on backorder. The only thing the have in stock are non extended release tablets. Are you able to switch the prescription to that so it can be filled until the other kind comes in? [facility]. Thank you!  Patient is aware that communication with behavioral health has to be done via telephone, and when the pharmacy does not have supply in stock, as this has happened before, he is to contact other pharmacies to determine availability and then inform the  office.  The prescription was ordered 1/26/23 which was 7 days ago, patient will need to follow process per CSA.         1/25/23:   After Rescheduling patient today he wanted me to let Celina know that the Adderall XR 20 mg seems to be working well, but he did DC the Wellbutrin due to excessive restlessness.  I rescheduled patient to February 07/2023.      12/28/22:   -Continue Zoloft 50 mg by mouth daily to target anxiety.    -Continue Wellbutrin  mg po daily in the morning to target attention and concentration deficit.      Increase Adderall XR from 15 mg TO 20 mg by mouth daily in the morning to target symptoms associated with ADHD. Risks, benefits, side effects discussed with patient including elevated heart rate, elevated blood pressure, irritability, insomnia, sexual dysfunction, appetite suppressing properties, psychosis.  After discussion of these risks and benefits, the patient voiced understanding and agreed to proceed. [place] reviewed, last dispensed 15 mg 11/30/22 #30/30 days.  Informed patient order would be sent to JUAN Lang in practice, as  is out of office today, in separate entry for signature due to EMR system, and will not see as refilled on AVS today.  Adderall XR 15 mg providing inconsistent relief of ADHD symptoms, denies side effects, will increase dose today and have patient return in 3 weeks, as patient will be returning to work before 4 weeks from today.  Patient to track daily symptoms, activities, ability to complete tasks and report at next appointment.  Patient instructed to notify provider if experiencing insomnia as combination with Wellbutrin XL may effect sleep and dose may need to be lowered.  Patient to contact provider if symptoms worsen or fail to improve.       11/30/22:  Continue Zoloft 50 mg by mouth daily to target anxiety.      Continue Wellbutrin  mg po daily in the morning to target attention and concentration deficit.  Instructed patient to  request refill in 2-3 weeks from pharmacy if continues to tolerate with Adderall XR, otherwise to call office and request dose decrease to 150 mg.   Increase Adderall XR from 10 mg TO 15 mg by mouth daily in the morning to target symptoms associated with ADHD. Risks, benefits, side effects discussed with patient including elevated heart rate, elevated blood pressure, irritability, insomnia, sexual dysfunction, appetite suppressing properties, psychosis.  After discussion of these risks and benefits, the patient voiced understanding and agreed to proceed. [place] reviewed, last dispensed 10 mg dose 11/3/22 #30/30 days.  Informed patient order would be sent to Dr. Singh in separate entry for signature due to EMR system, and will not see as refilled on AVS today.  Controlled substance documentation: [place] reviewed; prior urine drug screen consistent obtained 11/3/22; consent is up to date, signed, witnessed and in EHR, dated 11/3/22, which will be updated annually per policy. Patient is aware of risk of addiction on this medication, understands need to follow up for a review every 3 months and medications will be adjusted or decreased as deemed appropriate at each visit.  No history of drug or alcohol abuse.  No concerns about diversion or abuse. Patient denies side effects related to the medication.  Patient is aware of random urine drug screens and pill counts. The dosing of this medication will be reviewed on a regular basis and reduced if possible..  Ongoing use of a controlled substance is necessary for this patient to have a normal quality of life.    Anxiety symptoms are under good control with Zoloft and Wellbutrin XL.  ADHD symptoms remain present, minimal effectiveness with 10 mg dose of Adderall XR, tolerating well without side effects, will increase dose today and send to [facility] pharmacy, patient instructed to contact either [place] office and leave vm with MA and if not heard back within 1-2 hrs to call  the [place] office, or patient may call the [facility] office initially as MA is not physically in the [place] office today though checks vm.  Patient verbalized understanding. Patient to contact provider if symptoms worsen or fail to improve.     11/3/22:   Patient called to inform [facility], primary pharmacy, does not have Adderall XR 10 mg in stock, patient was instructed to call other local pharmacies to check availability.   Patient called back to tell us that he checked around and [facility] has the Adderall.  Please send there Pharmacy changed and med pended in chart    11/3/22:  Continue Zoloft 50 mg by mouth daily to target anxiety.      Continue Wellbutrin  mg po daily in the morning to target attention and concentration deficit.      Start Adderall XR 10 mg by mouth daily in the morning to target symptoms associated with ADHD. Risks, benefits, side effects discussed with patient including elevated heart rate, elevated blood pressure, irritability, insomnia, sexual dysfunction, appetite suppressing properties, psychosis.  After discussion of these risks and benefits, the patient voiced understanding and agreed to proceed. [place] reviewed, UDS ordered, and controlled substance agreement signed & witnessed. Informed patient prescription would not be ordered until UDS results noted as negative, expected results within 30 minutes of providing sample.   POC UDS today in clinic  ADHD testing results reviewed from Dr. Eladio Dunn,[facility] dated 9/29/2022 indicating a diagnosis of RAFAEL and ADHD.     Patient to request refills for Wellbutrin XL and Zoloft in approximately 2 weeks, as patient has been educated he may need to decrease Wellbutrin XL dose due to addition of Adderall XR.  Patient to contact provider if symptoms worsen or fail to improve.     10/3/22:   -Continue Zoloft 50 mg by mouth daily to target anxiety.    -Continue Wellbutrin  mg po daily in the morning to target  attention and concentration deficit.     Patient denies need for refills at this time, tolerating current medications well without reported side effects.  Patient reported completing ADHD testing last week with Dr. Eladio Dunn, report not expected for at least 2 weeks. Will plan on next appointment in 4 weeks in person due to possible stimulant medication needed, as patient will be starting an new job Nov 6 and would prefer to come in the week prior.  If ADHD report findings indicate no formal diagnosis of ADHD will contact patient prior to next appointment.  Patient to contact provider if symptoms worsen or fail to improve.       7/26/22:   Declined therapy  Continue Zoloft 50 mg by mouth daily to target anxiety.  Risks, benefits, alternatives discussed with patient including GI upset, nausea vomiting diarrhea, theoretical decrease of seizure threshold predisposing the patient to a slightly higher seizure risk, headaches, sexual dysfunction, serotonin syndrome, bleeding risk, increased suicidality in patients 24 years and younger.  After discussion of these risks and benefits, the patient voiced understanding and agreed to proceed.  Increase Wellbutrin XL from 150 mg to 300 mg (instructed to start taking 2 tabs of the 150 mg on hand until new prescription picked up) po daily in the morning to target attention and concentration deficit. Discussed all risks, benefits, alternatives, and side effects of Bupropion/Wellbutrin including but not limited to GI upset (N/V/D, constipation), tachycardia, diaphoresis, weight loss, agitation, dizziness, headache, insomnia, tremor, blurred vision, anorexia, HTN, activation of char or hypomania, CNS stimulation and neuropsychiatric effects, ocular effects, seizure risk, withdrawal syndrome following abrupt discontinuation, and activation of suicidal ideation and behavior. Patient  educated on the need to practice safe sex while taking this med. Discussed the need for patient to  immediately call the office for any new or worsening symptoms, such as worsening depression; feeling nervous or restless; suicidal thoughts or actions; or other changes in mood or behavior, and all other concerns. Patient educated on medication compliance. Patient  verbalized understanding and is agreeable to taking Bupropion/Wellbutrin. Addressed all questions and concerns.     Referral for ADHD testing.  Patient to contact provider and set up appointment.  And to contact office if unable to get an appointment scheduled. -Attached list of several other sites for ADHD testing. Patient instructed to contact providers on list to determine availability of appointments, instructed patient to take notes while calling places indicating first available appointment, and to ask to be placed on waiting list.  If an office is chosen and requests the referral order please contact my Medical Assistant, Serene, directly at [phone] informing of chosen office and the information will be sent.    Dr. Eladio Dunn, Psychologist, MS, LPP  [address], [address]  [place] [id number] [phone]   Currently only accepting referrals for psychological testing services.  Accepts Most insurance plans except Medicare Plans    Patient presentation seems most consistent with anxiety vs ADHD, due to high suspicion of ADHD will refer for formal testing.  Increased Wellbutrin XL today , will see patient back in 4 weeks.       Patient screened positive for depression based on a PHQ-9 score of 0 on 2/2/2024. Follow-up recommendations include: Prescribed antidepressant medication treatment and Suicide Risk Assessment performed.     TREATMENT PLAN/GOALS: Continue supportive psychotherapy efforts and medications as indicated. Treatment and medication options discussed during today's visit. Patient ackowledged and verbally consented to continue with current treatment plan and was educated on the importance of compliance with  treatment and follow-up appointments.    MEDICATION ISSUES:  ANUSHA reviewed as expected.  Discussed medication options and treatment plan of prescribed medication as well as the risks, benefits, and side effects including potential falls, possible impaired driving and metabolic adversities among others. Patient is agreeable to call the office with any worsening of symptoms or onset of side effects. Patient is agreeable to call 911 or go to the nearest ER should he/she begin having SI/HI. No medication side effects or related complaints today.     MEDS ORDERED DURING VISIT:  No orders of the defined types were placed in this encounter.      Return in about 3 months (around 5/2/2024) for Video visit, medication check.         I spent 25 minutes caring for Thomas on this date of service. This time includes time spent by me in the following activities: preparing for the visit, performing a medically appropriate examination and/or evaluation, counseling and educating the patient/family/caregiver, referring and communicating with other health care professionals, documenting information in the medical record, care coordination, and scheduling .      This document has been electronically signed by JUAN Hayes  February 2, 2024 08:19 EST      Part of this note may be an electronic transcription/translation of spoken language to printed text using the Dragon Dictation System.

## 2024-02-29 DIAGNOSIS — F90.0 ADHD (ATTENTION DEFICIT HYPERACTIVITY DISORDER), INATTENTIVE TYPE: ICD-10-CM

## 2024-02-29 RX ORDER — LISDEXAMFETAMINE DIMESYLATE CAPSULES 40 MG/1
40 CAPSULE ORAL EVERY MORNING
Qty: 30 CAPSULE | Refills: 0 | Status: SHIPPED | OUTPATIENT
Start: 2024-03-01

## 2024-02-29 NOTE — TELEPHONE ENCOUNTER
REFILL REQUEST FOR VYVANSE 40 MG CAPSULES.  lisdexamfetamine (Vyvanse) 40 MG capsule (01/27/2024)     FOLLOW UP APPT ON 05/02/2024.  PT LAST SEEN ON 02/02/2024.    ORDER IS PENDED.

## 2024-04-05 DIAGNOSIS — F90.0 ADHD (ATTENTION DEFICIT HYPERACTIVITY DISORDER), INATTENTIVE TYPE: ICD-10-CM

## 2024-04-05 RX ORDER — LISDEXAMFETAMINE DIMESYLATE CAPSULES 40 MG/1
40 CAPSULE ORAL EVERY MORNING
Qty: 30 CAPSULE | Refills: 0 | Status: SHIPPED | OUTPATIENT
Start: 2024-04-06

## 2024-04-05 NOTE — TELEPHONE ENCOUNTER
Med Refill Request. Patient states he is out of medication.  Patient has appt scheduled for 05/02/2024.  Med pended Please review

## 2024-05-02 ENCOUNTER — TELEMEDICINE (OUTPATIENT)
Dept: BEHAVIORAL HEALTH | Facility: CLINIC | Age: 34
End: 2024-05-02
Payer: COMMERCIAL

## 2024-05-02 DIAGNOSIS — Z79.899 MEDICATION MANAGEMENT: ICD-10-CM

## 2024-05-02 DIAGNOSIS — F90.0 ADHD (ATTENTION DEFICIT HYPERACTIVITY DISORDER), INATTENTIVE TYPE: Primary | ICD-10-CM

## 2024-05-02 DIAGNOSIS — F41.1 GENERALIZED ANXIETY DISORDER: ICD-10-CM

## 2024-05-02 NOTE — PATIENT INSTRUCTIONS
"  SPECIFIC RECOMMENDATIONS:     1.      Medications discussed at this encounter:                   -  Request refill for Vyvanse when needed, call [facility] Mail order pharmacy and upon request for Vyvanse indicate if a 90 day supply has been okay due to availability    2.      Psychotherapy recommendations:  Declined     3.     Return to clinic: 3 months Friday 8/2/24 at 8 am via video    Please arrive at least 15 minutes before your scheduled appointment time to complete check in process.      IF you are scheduled for a [employer] VIDEO visit, YOU MUST COMPLETE THE \"E-CHECK IN\" PROCESS PRIOR TO BEGINNING THE VISIT, YOU WILL NO LONGER RECEIVE A PHONE CALL PRIOR TO ALL VIDEO VISITS; You may still complete the E-Check in for in office visits prior to appointment, you will receive multiple text/email reminders which will direct you further if needed.           "

## 2024-05-02 NOTE — PROGRESS NOTES
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 ADHD Brother     ADD / ADHD Brother     Cardiomyopathy Paternal Uncle     Dementia Maternal Grandmother        Developmental History:   Born: [place]  Siblings:5   Childhood: Denies Abuse  High School:Completed  College: Bachelors in K[employer]  kinesiology     Mental Status Exam:   Hygiene:   good  Cooperation:  Cooperative  Eye Contact:  Good  Psychomotor Behavior:  Appropriate  Affect:  Appropriate  Mood: euthymic   Speech:  Normal  Thought Process:  Goal directed  Thought Content:  Mood congruent  Suicidal:  None  Homicidal:  None  Hallucinations:  None  Delusion:  None  Memory:  Intact  Orientation:  Person, Place, Time and Situation  Reliability:  good  Insight:  Good  Judgement:  Good  Impulse Control:  Good  Physical/Medical Issues:  Yes Esophageal Dysphagia      Review of Systems:  Review of Systems   Constitutional:  Negative for diaphoresis and fatigue.   HENT:  Positive for hearing loss and tinnitus. Negative for drooling.         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   Respiratory:  Negative for cough and shortness of breath.    Cardiovascular:  Negative for chest pain, palpitations and leg swelling.   Gastrointestinal:  Negative for nausea and vomiting.   Endocrine: Negative for cold intolerance and heat intolerance.   Genitourinary:  Negative for difficulty urinating.   Musculoskeletal:  Negative for joint swelling.    Allergic/Immunologic: Negative for immunocompromised state.   Neurological:  Negative for dizziness, tremors, seizures, speech difficulty and numbness.   Psychiatric/Behavioral:  Negative for decreased concentration, hallucinations, self-injury, sleep disturbance and suicidal ideas. The patient is not nervous/anxious and is not hyperactive.          Physical Exam:  Physical Exam  Psychiatric:         Attention and Perception: Attention and perception normal.         Mood and Affect: Mood and affect normal.         Speech: Speech normal.         Behavior: Behavior normal. Behavior is cooperative.         Thought Content: Thought content normal. Thought content does not include suicidal ideation. Thought content does not include suicidal plan.         Cognition and Memory: Cognition and memory normal.         Judgment: Judgment normal.         Vital Signs:   There were no vitals taken for this visit.     Lab Results:   Clinical Support on 11/03/2023   Component Date Value Ref Range Status    Amphetamine Screen, Urine 11/03/2023 Positive (A)  Negative Final    Barbiturates Screen, Urine 11/03/2023 Negative  Negative Final    Buprenorphine, Screen, Urine 11/03/2023 Negative  Negative Final    Benzodiazepine Screen, Urine 11/03/2023 Negative  Negative Final    Cocaine Screen, Urine 11/03/2023 Negative  Negative Final    MDMA (ECSTASY) 11/03/2023 Negative  Negative Final    Methamphetamine, Ur 11/03/2023 Negative  Negative Final    Methadone Screen, Urine 11/03/2023 Negative  Negative Final    Opiate Screen 11/03/2023 Negative  Negative Final    Oxycodone Screen, Urine 11/03/2023 Negative  Negative Final    Phencyclidine (PCP), Urine 11/03/2023 Negative  Negative Final    THC, Screen, Urine 11/03/2023 Negative  Negative Final    Lot Number 11/03/2023 [id number]   Final    Expiration Date 11/03/2023 09/14/24   Final       EKG Results:  No orders to display       Imaging Results:  No Images in the past 120 days  found..      Assessment & Plan       Visit Diagnoses:    ICD-10-CM ICD-9-CM   1. ADHD (attention deficit hyperactivity disorder), inattentive type  F90.0 314.00   2. Generalized anxiety disorder  F41.1 300.02   3. Medication management  Z79.899 V58.69               PLAN:  1.   Safety: No acute safety concerns  Therapy: None  Risk Assessment: Risk of self-harm acutely is moderate.  Risk factors include anxiety disorder,family history, access to guns/weapons, and recent psychosocial stressors (pandemic). Protective factors include no present SI, no history of suicide attempts or self-harm in the past, minimal AODA, healthcare seeking, future orientation, willingness to engage in care.  Risk of self-harm chronically is also moderate, but could be further elevated in the event of treatment noncompliance and/or AODA.  Meds:    -Continue Zoloft 50 mg by mouth daily to target anxiety and PTSD.      -Continue Vyvanse 40 mg by mouth daily in the morning without food to target symptoms associated with ADHD.  Instructed patient to take on an empty stomach to avoid delayed onset of action, and may eat 20-30 minutes after taking medication, though if unable to tolerate or wait to eat to expect the onset of action to be delayed.  Risks, benefits, and alternatives discussed with patient including insomnia, headache, irritability, overstimulation, tremor, dizziness, nausea, dry mouth, constipation, diarrhea, weight loss, sexual dysfunction, heart palpitations, elevated heart rate, elevated blood pressure, seizures and suicidal ideations though rare.  After discussion of these risks patient agreed to proceed with Vyvanse. Discussed all questions and concerns.  Patient instructed to contact provider immediately with any intolerable side effects, cardiac symptoms, suicidal ideations,  or worsening anxiety. Last dispensed 4/5/24 #30/30 days. Patient to request refill when needed via [employer]. Instructed patient to write a note upon the  refill request if he wishes to have rx sent to [facility] and indicate 90 days, as patient plans to contact mail order pharmacy to determine availability of quantity.     Controlled substance documentation: [place] reviewed; prior urine drug screen consistent obtained 11/3/23; consent is up to date, signed, witnessed and in EHR, dated today 11/3/23, which will be updated annually per policy. Patient is aware of risk of addiction on this medication, understands need to follow up for a review every 3 months and medications will be adjusted or decreased as deemed appropriate at each visit.  No history of drug or alcohol abuse.  No concerns about diversion or abuse. Patient denies side effects related to the medication.  Patient is aware of random urine drug screens and pill counts. The dosing of this medication will be reviewed on a regular basis and reduced if possible. Ongoing use of a controlled substance is necessary for this patient to have a normal quality of life.  Labs: n/a    Symptoms of anxiety, ADHD are under good control with current medication regimen.    Patient was given instructions and counseling regarding condition and for health maintenance advice. Please see specific information pulled into the AVS if appropriate.    Patient to contact provider if symptoms worsen or fail to improve.      2/2/24:   -Continue Zoloft 50 mg by mouth daily to target anxiety and PTSD.    -Continue Vyvanse 40 mg by mouth daily in the morning without food to target symptoms associated with ADHD. Last dispensed 1/26/24 #30/30 days. Patient to request refill when needed via [employer].     Symptoms of anxiety and ADHD are under good control with current medication regimen.  Updated pharmacy to [facility] per patient request which is mail order, secure email sent to patient email which was verified for patient to inform provider of whether or not the mail order pharmacy requires 90 days for the Vyvanse, and how soon the new  prescription needs to be sent to ensure no missed doses and the turn around time, as patient was instructed to contact them and get back to provider. Will supply 90 days if pharmacy will fill as in the past the local pharmacy would not fill due to nationwide shortages.   Patient was given instructions and counseling regarding condition and for health maintenance advice. Please see specific information pulled into the AVS if appropriate.    Patient to contact provider if symptoms worsen or fail to improve.        11/3/23:  -Continue Zoloft 50 mg by mouth daily to target anxiety and PTSD.    -Continue Vyvanse 40 mg by mouth daily in the morning without food to target symptoms associated with ADHD.  Instructed patient to take on an empty stomach to avoid delayed onset of action, and may eat 20-30 minutes after taking medication, though if unable to tolerate or wait to eat to expect the onset of action to be delayed.  Risks, benefits, and alternatives discussed with patient including insomnia, headache, irritability, overstimulation, tremor, dizziness, nausea, dry mouth, constipation, diarrhea, weight loss, sexual dysfunction, heart palpitations, elevated heart rate, elevated blood pressure, seizures and suicidal ideations though rare.  After discussion of these risks patient agreed to proceed with Vyvanse. Discussed all questions and concerns.  Patient instructed to contact provider immediately with any intolerable side effects, cardiac symptoms, suicidal ideations,  or worsening anxiety. Last dispensed 10/05/23 #30/30 days. Informed patient order would be sent to Dr. Singh in separate entry for signature due to EMR system, and will not see as refilled on AVS today.  Will send in for 90 day supply with first fill date of Monday 11/6/23, patient is now able to request refills via [employer].     Controlled substance documentation: [place] reviewed; prior urine drug screen consistent obtained 11/3/22, ordered for today;  consent is up to date, signed, witnessed and in EHR, dated today 11/3/23, which will be updated annually per policy. Patient is aware of risk of addiction on this medication, understands need to follow up for a review every 3 months and medications will be adjusted or decreased as deemed appropriate at each visit.  No history of drug or alcohol abuse.  No concerns about diversion or abuse. Patient denies side effects related to the medication.  Patient is aware of random urine drug screens and pill counts. The dosing of this medication will be reviewed on a regular basis and reduced if possible. Ongoing use of a controlled substance is necessary for this patient to have a normal quality of life.  Labs: POC UDS today in clinic  Symptoms of ADHD are under good control with current medication regimen.    Patient was given instructions and counseling regarding condition and for health maintenance advice. Please see specific information pulled into the AVS if appropriate.    Patient to contact provider if symptoms worsen or fail to improve.      8/16/23:   Continue Zoloft 50 mg by mouth daily to target anxiety.      Continue Vyvanse 40 mg by mouth daily in the morning without food to target symptoms associated with ADHD.  Instructed patient to take on an empty stomach to avoid delayed onset of action, and may eat 20-30 minutes after taking medication, though if unable to tolerate or wait to eat to expect the onset of action to be delayed.  Risks, benefits, and alternatives discussed with patient including insomnia, headache, irritability, overstimulation, tremor, dizziness, nausea, dry mouth, constipation, diarrhea, weight loss, sexual dysfunction, heart palpitations, elevated heart rate, elevated blood pressure, seizures and suicidal ideations though rare.  After discussion of these risks patient agreed to proceed with Vyvanse. Discussed all questions and concerns.  Patient instructed to contact provider immediately  with any intolerable side effects, cardiac symptoms, suicidal ideations,  or worsening anxiety. Last dispensed 23 #30/30 days. Informed patient order would be sent to Dr. Singh in separate entry for signature due to EMR system, and will not see as refilled on AVS today.  With first fill date of 23.     Symptoms of ADHD are under good control with current medication regimen.  Next visit will be in the office for annual CSA and UDS, scheduled for Friday Nov. 3, 2023 at 8 am.   Patient was given instructions and counseling regarding condition and for health maintenance advice. Please see specific information pulled into the AVS if appropriate.    Patient to contact provider if symptoms worsen or fail to improve.         23:  Continue Zoloft 50 mg by mouth daily to target anxiety.  Refilled today for 90 days with 3 refills.    Continue Vyvanse 40 mg by mouth daily in the morning without food to target symptoms associated with ADHD. Last dispensed 6/15/23 #30/30 days.  Informed patient order would be sent to Dr. Lyles in separate entry for signature due to EMR system, and will not see as refilled on AVS today, as Dr. Singh is out of the office.    Symptoms of anxiety and ADHD are under good control with current medication regimen.  Discussed option for a 90 day supply once long term dose determined, as patient started 40 mg of Vyvanse 23, will see back in 4 weeks to determine if further dose adjustments are needed.  Patient informed current CSA will  in Nov. 3 And will need to be seen in office by or before Nov. 3, 2023.   Patient was given instructions and counseling regarding condition and for health maintenance advice. Please see specific information pulled into the AVS if appropriate.    Patient to contact provider if symptoms worsen or fail to improve.         23:  -Continue Zoloft 50 mg by mouth daily to target anxiety.  Due to upcoming travel to [place] and [place] from  6/30/23-7/18/23, will send in a quantity of 4 to [facility] pharmacy today, will fill for 90 days at next appointment.   -Continue Vyvanse 40 mg by mouth daily in the morning without food to target symptoms associated with ADHD.  Instructed patient to take on an empty stomach to avoid delayed onset of action, and may eat 20-30 minutes after taking medication, though if unable to tolerate or wait to eat to expect the onset of action to be delayed.  Risks, benefits, and alternatives discussed with patient including insomnia, headache, irritability, overstimulation, tremor, dizziness, nausea, dry mouth, constipation, diarrhea, weight loss, sexual dysfunction, heart palpitations, elevated heart rate, elevated blood pressure, seizures and suicidal ideations though rare.  After discussion of these risks patient agreed to proceed with Vyvanse. Discussed all questions and concerns.  Patient instructed to contact provider immediately with any intolerable side effects, cardiac symptoms, suicidal ideations,  or worsening anxiety. Last dispensed 6/15/23 #30/30 days. Due to upcoming travel to [place] and [place] from 6/30/23-7/18/23, will send in a quantity of 4 to [facility] pharmacy today with allowed early fill.  Informed patient order would be sent to Dr. Singh in separate entry for signature due to EMR system, and will not see as refilled on AVS today.     Controlled substance documentation: [place] reviewed; prior urine drug screen consistent obtained 11/3/22; consent is up to date, signed, witnessed and in EHR, dated 11/3/22, Vyvanse consent for treatment form dated 4/12/23, which will be updated annually per policy. Patient is aware of risk of addiction on this medication, understands need to follow up for a review every 3 months and medications will be adjusted or decreased as deemed appropriate at each visit.  No history of drug or alcohol abuse.  No concerns about diversion or abuse. Patient denies  side effects related to the medication.  Patient is aware of random urine drug screens and pill counts. The dosing of this medication will be reviewed on a regular basis and reduced if possible. Ongoing use of a controlled substance is necessary for this patient to have a normal quality of life.  Symptoms of ADHD and anxiety are under good control with current medication regimen. Tolerating well without reported side effects.   Patient to contact provider if symptoms worsen or fail to improve.        5/12/23:  Symptoms of ADHD are under fair control with Vyvanse 30 mg, will increase dose today to 40 mg. Last dispensed 4/12/23 #30/30 days. Tolerating well without reported side effects.   Patient to contact provider if symptoms worsen or fail to improve.        4/12/23:   -Continue Zoloft 50 mg by mouth daily to target anxiety.    -Stop Azstarys (Serdexmethylphenidate and dexmethylphenidate) 26.1 mg-5.2 mg due to cost of medication.   -Start  Vyvanse 30 mg by mouth daily in the morning without food to target symptoms associated with ADHD.  Instructed patient to take on an empty stomach to avoid delayed onset of action, and may eat 20-30 minutes after taking medication, though if unable to tolerate or wait to eat to expect the onset of action to be delayed.  Risks, benefits, and alternatives discussed with patient including insomnia, headache, irritability, overstimulation, tremor, dizziness, nausea, dry mouth, constipation, diarrhea, weight loss, sexual dysfunction, heart palpitations, elevated heart rate, elevated blood pressure, seizures and suicidal ideations though rare.  After discussion of these risks patient agreed to proceed with Vyvanse. Discussed all questions and concerns.  Patient instructed to contact provider immediately with any intolerable side effects, cardiac symptoms, suicidal ideations,  or worsening anxiety.     Informed patient order would be sent to Dr. Singh in separate entry for signature due  to EMR system, and will not see as refilled on AVS today.  Instructed patient to come by [place] office today as he planned to sign medication portion controlled substance for Azstarys and Vyvanse.     Symptoms of ADHD are not under good control with Azstarys and due to high cost patient wishes to switch to Vyvanse.   Contacted [place] office staff and instructed staff to place another controlled substance consent to treat with Vyvanse and place in provider box for patient to sign both forms today.  Will send Vyvanse to [facility] as patient is going there today. Patient to contact provider if symptoms worsen or fail to improve.     3/15/23:  Continue Zoloft 50 mg by mouth daily to target anxiety.    -Discontinue Adderall XR 20 mg due to nationwide shortage patient has not been able to locate a pharmacy that has medication in stock, and has not had medication for 1 month.  last dispensed 12/28/22 #30/30 days.      Start Azstarys (Serdexmethylphenidate and dexmethylphenidate) 26.1 mg-5.2 mg by mouth daily in the morning on an empty stomach (may eat in 20-30 minutes to help with absorption) to target symptoms associated with ADHD. Risks, benefits, side effects discussed with patient including elevated heart rate, elevated blood pressure, irritability, insomnia, sexual dysfunction, appetite suppressing properties, psychosis. After discussion of these risks and benefits, the patient voiced understanding and agreed to proceed. Informed patient order would be sent to Dr. Singh in separate entry for signature due to EMR system, and will not see as refilled on AVS today.  Instructed patient to come by [place] office within the next week to sign medication portion controlled substance for Azstarys.     Due to nationwide shortage of Adderall, discussed various options for ADHD treatment, patient willing to switch to alternate medication. Symptoms of ADHD remain present as patient has not had medication for at  "least 1 month.  Patient to contact provider if unable to  medication by tomorrow, given direct contact number for MA at [place] office.  Patient to contact provider if symptoms worsen or fail to improve.       2/9/23:   Your request has been approved  Your PA request has been approved. Additional information will be provided in the approval communication. **LPN Reviewed- Complete Clinical**COT, paid claim//MDO verified information// Last paid claim 12-// Adderall XR 20 MG #30/30ds//F90.0 Attn-defct hyperactivity disorder, predom inattentive type//75 Prior Authorization Reqrd +MUST Use generics, AZSTARYS, JORNAY PM, MYDAYIS, VYVANSE MEDICAL NECESSITY PA ONLY [phone] Drug Requires Prior Authorization// Approve, 36 months. -STEVIE, LPN       ADDERALL XR 20MG            11:56 AM    Serene Salazar MA contacted Thomas Lei \"Renard\"    Serene Salazar MA         11:58 AM  Note    Called patient to let him know that we have received the approval for his Adderall XR 20mg        2/7/23:  Continue Zoloft 50 mg by mouth daily to target anxiety.      Continue Adderall XR 20 mg by mouth daily in the morning to target symptoms associated with ADHD. Risks, benefits, side effects discussed with patient including elevated heart rate, elevated blood pressure, irritability, insomnia, sexual dysfunction, appetite suppressing properties, psychosis.  After discussion of these risks and benefits, the patient voiced understanding and agreed to proceed. [place] reviewed, last dispensed 12/28/22 #30/30 days.  New order for 20 mg dose sent 1/26/23 to [facility] pharmacy due to [facility] pharmacy out of stock, however, awaiting PA from insurance at this time.     Patient noted with improvement of ADHD symptoms with dose increase of Adderall XR to 20 mg, however, PA is required which was reviewed with patient during visit.  Patient given detailed information to follow up with insurance company to determine " specifics regarding which and how many medications have to be tried before restarting current dose of Adderall and contact office to MA directly later today.  Patient was not allowed option to pay out of pocket from pharmacy.  Will be in touch with patient later today.        2/6/23:  VICENTA LEI Key: [id number] - PA Case ID: [id number] - Rx #: [id number]  PA for Adderall XR 20mg sent to plan 02/06/2023 awaiting decision from insurance company      2/2/23: [employer] message from patient wife to PCP which was directed to this provider:   This message is being sent by Cindi Lei on behalf of Vicenta Lei.   Hi Dr. Haskins,     This is Cindi Lei on Vicenta Lei account. I am trying to  his medication, the generic Adderral and they are saying the extended release capsules are on backorder. The only thing the have in stock are non extended release tablets. Are you able to switch the prescription to that so it can be filled until the other kind comes in? [facility]. Thank you!  Patient is aware that communication with behavioral health has to be done via telephone, and when the pharmacy does not have supply in stock, as this has happened before, he is to contact other pharmacies to determine availability and then inform the office.  The prescription was ordered 1/26/23 which was 7 days ago, patient will need to follow process per [facility].         1/25/23:   After Rescheduling patient today he wanted me to let Celina know that the Adderall XR 20 mg seems to be working well, but he did DC the Wellbutrin due to excessive restlessness.  I rescheduled patient to February 07/2023.      12/28/22:   -Continue Zoloft 50 mg by mouth daily to target anxiety.    -Continue Wellbutrin  mg po daily in the morning to target attention and concentration deficit.      Increase Adderall XR from 15 mg TO 20 mg by mouth daily in the morning to target symptoms associated with ADHD. Risks, benefits, side  effects discussed with patient including elevated heart rate, elevated blood pressure, irritability, insomnia, sexual dysfunction, appetite suppressing properties, psychosis.  After discussion of these risks and benefits, the patient voiced understanding and agreed to proceed. [place] reviewed, last dispensed 15 mg 11/30/22 #30/30 days.  Informed patient order would be sent to JUAN Lang in practice, as  is out of office today, in separate entry for signature due to EMR system, and will not see as refilled on AVS today.  Adderall XR 15 mg providing inconsistent relief of ADHD symptoms, denies side effects, will increase dose today and have patient return in 3 weeks, as patient will be returning to work before 4 weeks from today.  Patient to track daily symptoms, activities, ability to complete tasks and report at next appointment.  Patient instructed to notify provider if experiencing insomnia as combination with Wellbutrin XL may effect sleep and dose may need to be lowered.  Patient to contact provider if symptoms worsen or fail to improve.       11/30/22:  Continue Zoloft 50 mg by mouth daily to target anxiety.      Continue Wellbutrin  mg po daily in the morning to target attention and concentration deficit.  Instructed patient to request refill in 2-3 weeks from pharmacy if continues to tolerate with Adderall XR, otherwise to call office and request dose decrease to 150 mg.   Increase Adderall XR from 10 mg TO 15 mg by mouth daily in the morning to target symptoms associated with ADHD. Risks, benefits, side effects discussed with patient including elevated heart rate, elevated blood pressure, irritability, insomnia, sexual dysfunction, appetite suppressing properties, psychosis.  After discussion of these risks and benefits, the patient voiced understanding and agreed to proceed. Channing reviewed, last dispensed 10 mg dose 11/3/22 #30/30 days.  Informed patient order would be sent to Dr. Singh in  separate entry for signature due to EMR system, and will not see as refilled on AVS today.  Controlled substance documentation: Channing reviewed; prior urine drug screen consistent obtained 11/3/22; consent is up to date, signed, witnessed and in EHR, dated 11/3/22, which will be updated annually per policy. Patient is aware of risk of addiction on this medication, understands need to follow up for a review every 3 months and medications will be adjusted or decreased as deemed appropriate at each visit.  No history of drug or alcohol abuse.  No concerns about diversion or abuse. Patient denies side effects related to the medication.  Patient is aware of random urine drug screens and pill counts. The dosing of this medication will be reviewed on a regular basis and reduced if possible..  Ongoing use of a controlled substance is necessary for this patient to have a normal quality of life.    Anxiety symptoms are under good control with Zoloft and Wellbutrin XL.  ADHD symptoms remain present, minimal effectiveness with 10 mg dose of Adderall XR, tolerating well without side effects, will increase dose today and send to [facility] pharmacy, patient instructed to contact either [place] office and leave vm with MA and if not heard back within 1-2 hrs to call the [place] office, or patient may call the [facility] office initially as MA is not physically in the [place] office today though checks vm.  Patient verbalized understanding. Patient to contact provider if symptoms worsen or fail to improve.     11/3/22:   Patient called to inform [facility], primary pharmacy, does not have Adderall XR 10 mg in stock, patient was instructed to call other local pharmacies to check availability.   Patient called back to tell us that he checked around and [facility] has the Adderall.  Please send there Pharmacy changed and med pended in chart    11/3/22:  Continue Zoloft 50 mg by mouth daily to target anxiety.       Continue Wellbutrin  mg po daily in the morning to target attention and concentration deficit.      Start Adderall XR 10 mg by mouth daily in the morning to target symptoms associated with ADHD. Risks, benefits, side effects discussed with patient including elevated heart rate, elevated blood pressure, irritability, insomnia, sexual dysfunction, appetite suppressing properties, psychosis.  After discussion of these risks and benefits, the patient voiced understanding and agreed to proceed. Channing reviewed, UDS ordered, and controlled substance agreement signed & witnessed. Informed patient prescription would not be ordered until UDS results noted as negative, expected results within 30 minutes of providing sample.   POC UDS today in clinic  ADHD testing results reviewed from Dr. Eladio Dunn,[facility] dated 9/29/2022 indicating a diagnosis of RAFAEL and ADHD.     Patient to request refills for Wellbutrin XL and Zoloft in approximately 2 weeks, as patient has been educated he may need to decrease Wellbutrin XL dose due to addition of Adderall XR.  Patient to contact provider if symptoms worsen or fail to improve.     10/3/22:   -Continue Zoloft 50 mg by mouth daily to target anxiety.    -Continue Wellbutrin  mg po daily in the morning to target attention and concentration deficit.     Patient denies need for refills at this time, tolerating current medications well without reported side effects.  Patient reported completing ADHD testing last week with Dr. Eladio Dunn, report not expected for at least 2 weeks. Will plan on next appointment in 4 weeks in person due to possible stimulant medication needed, as patient will be starting an new job Nov 6 and would prefer to come in the week prior.  If ADHD report findings indicate no formal diagnosis of ADHD will contact patient prior to next appointment.  Patient to contact provider if symptoms worsen or fail to improve.       7/26/22:   Declined therapy  Continue  Zoloft 50 mg by mouth daily to target anxiety.  Risks, benefits, alternatives discussed with patient including GI upset, nausea vomiting diarrhea, theoretical decrease of seizure threshold predisposing the patient to a slightly higher seizure risk, headaches, sexual dysfunction, serotonin syndrome, bleeding risk, increased suicidality in patients 24 years and younger.  After discussion of these risks and benefits, the patient voiced understanding and agreed to proceed.  Increase Wellbutrin XL from 150 mg to 300 mg (instructed to start taking 2 tabs of the 150 mg on hand until new prescription picked up) po daily in the morning to target attention and concentration deficit. Discussed all risks, benefits, alternatives, and side effects of Bupropion/Wellbutrin including but not limited to GI upset (N/V/D, constipation), tachycardia, diaphoresis, weight loss, agitation, dizziness, headache, insomnia, tremor, blurred vision, anorexia, HTN, activation of char or hypomania, CNS stimulation and neuropsychiatric effects, ocular effects, seizure risk, withdrawal syndrome following abrupt discontinuation, and activation of suicidal ideation and behavior. Patient  educated on the need to practice safe sex while taking this med. Discussed the need for patient to immediately call the office for any new or worsening symptoms, such as worsening depression; feeling nervous or restless; suicidal thoughts or actions; or other changes in mood or behavior, and all other concerns. Patient educated on medication compliance. Patient  verbalized understanding and is agreeable to taking Bupropion/Wellbutrin. Addressed all questions and concerns.     Referral for ADHD testing.  Patient to contact provider and set up appointment.  And to contact office if unable to get an appointment scheduled. -Attached list of several other sites for ADHD testing. Patient instructed to contact providers on list to determine availability of appointments,  instructed patient to take notes while calling places indicating first available appointment, and to ask to be placed on waiting list.  If an office is chosen and requests the referral order please contact my Medical Assistant, Serene, directly at [phone] informing of chosen office and the information will be sent.    Dr. Eladio Dunn, Psychologist, MS, LPP  [address], [address]  [place] [id number] [phone]   Currently only accepting referrals for psychological testing services.  Accepts Most insurance plans except Medicare Plans    Patient presentation seems most consistent with anxiety vs ADHD, due to high suspicion of ADHD will refer for formal testing.  Increased Wellbutrin XL today , will see patient back in 4 weeks.       Patient screened positive for depression based on a PHQ-9 score of 0 on 2/2/2024. Follow-up recommendations include: Prescribed antidepressant medication treatment and Suicide Risk Assessment performed.     TREATMENT PLAN/GOALS: Continue supportive psychotherapy efforts and medications as indicated. Treatment and medication options discussed during today's visit. Patient ackowledged and verbally consented to continue with current treatment plan and was educated on the importance of compliance with treatment and follow-up appointments.    MEDICATION ISSUES:  ANUSHA reviewed as expected.  Discussed medication options and treatment plan of prescribed medication as well as the risks, benefits, and side effects including potential falls, possible impaired driving and metabolic adversities among others. Patient is agreeable to call the office with any worsening of symptoms or onset of side effects. Patient is agreeable to call 911 or go to the nearest ER should he/she begin having SI/HI. No medication side effects or related complaints today.     MEDS ORDERED DURING VISIT:  No orders of the defined types were placed in this encounter.      Return in about 3 months  (around 8/2/2024).         I spent 18 minutes caring for Thomas on this date of service. This time includes time spent by me in the following activities: preparing for the visit, performing a medically appropriate examination and/or evaluation, counseling and educating the patient/family/caregiver, referring and communicating with other health care professionals, documenting information in the medical record, care coordination, and scheduling .      This document has been electronically signed by JUAN Hayes  May 2, 2024 08:19 EDT      Part of this note may be an electronic transcription/translation of spoken language to printed text using the Dragon Dictation System.

## 2024-05-18 DIAGNOSIS — F90.0 ADHD (ATTENTION DEFICIT HYPERACTIVITY DISORDER), INATTENTIVE TYPE: ICD-10-CM

## 2024-05-20 RX ORDER — LISDEXAMFETAMINE DIMESYLATE 40 MG/1
40 CAPSULE ORAL EVERY MORNING
Qty: 30 CAPSULE | Refills: 0 | Status: SHIPPED | OUTPATIENT
Start: 2024-05-20

## 2024-05-28 DIAGNOSIS — F90.0 ADHD (ATTENTION DEFICIT HYPERACTIVITY DISORDER), INATTENTIVE TYPE: ICD-10-CM

## 2024-05-28 RX ORDER — DEXTROAMPHETAMINE SACCHARATE, AMPHETAMINE ASPARTATE MONOHYDRATE, DEXTROAMPHETAMINE SULFATE AND AMPHETAMINE SULFATE 2.5; 2.5; 2.5; 2.5 MG/1; MG/1; MG/1; MG/1
CAPSULE, EXTENDED RELEASE ORAL EVERY MORNING
Qty: 30 CAPSULE | Refills: 0 | OUTPATIENT
Start: 2024-05-28

## 2024-05-28 NOTE — TELEPHONE ENCOUNTER
dextroamphetamine-amphetamine ER 10 mg 24hr capsule,extend release    Last refill: 11/3/2022     Follow up 08/02/2024

## 2024-06-25 DIAGNOSIS — F90.0 ADHD (ATTENTION DEFICIT HYPERACTIVITY DISORDER), INATTENTIVE TYPE: ICD-10-CM

## 2024-06-25 RX ORDER — LISDEXAMFETAMINE DIMESYLATE 40 MG/1
40 CAPSULE ORAL EVERY MORNING
Qty: 30 CAPSULE | Refills: 0 | Status: SHIPPED | OUTPATIENT
Start: 2024-06-25

## 2024-06-25 NOTE — TELEPHONE ENCOUNTER
lisdexamfetamine (Vyvanse) 40 MG capsule     Last ordered: 1 month ago (5/20/2024) by Efrain Singh MD     Follow up 08/02/2024

## 2024-08-02 ENCOUNTER — TELEMEDICINE (OUTPATIENT)
Dept: BEHAVIORAL HEALTH | Facility: CLINIC | Age: 34
End: 2024-08-02
Payer: COMMERCIAL

## 2024-08-02 DIAGNOSIS — F90.0 ADHD (ATTENTION DEFICIT HYPERACTIVITY DISORDER), INATTENTIVE TYPE: ICD-10-CM

## 2024-08-02 DIAGNOSIS — Z79.899 MEDICATION MANAGEMENT: ICD-10-CM

## 2024-08-02 DIAGNOSIS — F41.1 GENERALIZED ANXIETY DISORDER: ICD-10-CM

## 2024-08-02 DIAGNOSIS — F90.0 ADHD (ATTENTION DEFICIT HYPERACTIVITY DISORDER), INATTENTIVE TYPE: Primary | ICD-10-CM

## 2024-08-02 RX ORDER — LISDEXAMFETAMINE DIMESYLATE 40 MG/1
40 CAPSULE ORAL EVERY MORNING
Qty: 30 CAPSULE | Refills: 0 | Status: SHIPPED | OUTPATIENT
Start: 2024-08-02

## 2024-08-02 NOTE — PROGRESS NOTES
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 Wellbutrin XL up to 300 mg self stopped 1/2023 due to excessive restlessness with combination of Adderall XR 20 mg  Adderall XR 20 mg eff.due to nationwide shortage stopped 1/2023; Azstarys lowest dose 1 month, due to cost stopping,somewhat eff.  Self Harm: Denies  Suicide Attempts:Denies      Substance Abuse History:   Types:Denies all, including illicit  Withdrawal Symptoms:Denies  Longest Period Sober:Not Applicable   AA: Not applicable     Social History: As of 8/2023  Martial Status:  Employed:Yes and If so, where [employer] and . 9a-5p  Kids:Yes or If so, how many 2 girls     House:Lives in a house   History:  [id number]  Access to Guns: Yes, put in storage not in home    Social History     Socioeconomic History    Marital status:     Number of children: 2    Years of education: 16    Highest education level: Bachelor's degree (e.g., BA, AB, BS)   Tobacco Use    Smoking status: Never    Smokeless tobacco: Former     Types: Chew     Quit date: 5/21/2022   Vaping Use    Vaping status: Never Used   Substance and Sexual Activity    Alcohol use: Yes     Comment: 1-2 drinks per month     Drug use: Not Currently     Types: Marijuana     Comment: back in high school    Sexual activity: Yes       Family History:   Suicide Attempts:  Brother  Suicide Completions:Denies      Family History   Problem Relation Age of Onset    Sjogren's syndrome Mother     Diverticulitis Mother      Cardiomyopathy Father     Suicide Attempts Brother     Seizures Brother     ADD / ADHD Brother     ADD / ADHD Brother     Cardiomyopathy Paternal Uncle     Dementia Maternal Grandmother        Developmental History:   Born: [place]  Siblings:5   Childhood: Denies Abuse  High School:Completed  College: Bachelors in K[employer]  kinesiology     Mental Status Exam:   Hygiene:   good  Cooperation:  Cooperative  Eye Contact:  Good  Psychomotor Behavior:  Appropriate  Affect:  Appropriate  Mood: euthymic   Speech:  Normal  Thought Process:  Goal directed  Thought Content:  Mood congruent  Suicidal:  None  Homicidal:  None  Hallucinations:  None  Delusion:  None  Memory:  Intact  Orientation:  Person, Place, Time and Situation  Reliability:  good  Insight:  Good  Judgement:  Good  Impulse Control:  Good  Physical/Medical Issues:  Yes Esophageal Dysphagia      Review of Systems:  Review of Systems   Constitutional:  Negative for diaphoresis and fatigue.   HENT:  Positive for hearing loss and tinnitus. Negative for drooling.         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   Respiratory:  Negative for cough and shortness of breath.    Cardiovascular:  Negative for chest pain, palpitations and leg swelling.   Gastrointestinal:  Negative for nausea and vomiting.   Endocrine: Negative for cold intolerance and heat intolerance.   Genitourinary:  Negative for difficulty urinating.   Musculoskeletal:  Negative for joint swelling.   Allergic/Immunologic: Negative for immunocompromised state.   Neurological:  Negative for dizziness, tremors, seizures, speech difficulty and numbness.   Psychiatric/Behavioral:  Negative for decreased concentration, hallucinations, self-injury, sleep disturbance and suicidal ideas. The patient is not nervous/anxious and is not hyperactive.          Physical Exam:  Physical Exam  Psychiatric:         Attention and Perception: Attention and perception normal.          Mood and Affect: Mood and affect normal.         Speech: Speech normal.         Behavior: Behavior normal. Behavior is cooperative.         Thought Content: Thought content normal. Thought content does not include suicidal ideation. Thought content does not include suicidal plan.         Cognition and Memory: Cognition and memory normal.         Judgment: Judgment normal.         Vital Signs:   There were no vitals taken for this visit.     Lab Results:   No visits with results within 6 Month(s) from this visit.   Latest known visit with results is:   Clinical Support on 11/03/2023   Component Date Value Ref Range Status    Amphetamine Screen, Urine 11/03/2023 Positive (A)  Negative Final    Barbiturates Screen, Urine 11/03/2023 Negative  Negative Final    Buprenorphine, Screen, Urine 11/03/2023 Negative  Negative Final    Benzodiazepine Screen, Urine 11/03/2023 Negative  Negative Final    Cocaine Screen, Urine 11/03/2023 Negative  Negative Final    MDMA (ECSTASY) 11/03/2023 Negative  Negative Final    Methamphetamine, Ur 11/03/2023 Negative  Negative Final    Methadone Screen, Urine 11/03/2023 Negative  Negative Final    Opiate Screen 11/03/2023 Negative  Negative Final    Oxycodone Screen, Urine 11/03/2023 Negative  Negative Final    Phencyclidine (PCP), Urine 11/03/2023 Negative  Negative Final    THC, Screen, Urine 11/03/2023 Negative  Negative Final    Lot Number 11/03/2023 [id number]   Final    Expiration Date 11/03/2023 09/14/24   Final       EKG Results:  No orders to display       Imaging Results:  No Images in the past 120 days found..      Assessment & Plan       Visit Diagnoses:    ICD-10-CM ICD-9-CM   1. ADHD (attention deficit hyperactivity disorder), inattentive type  F90.0 314.00   2. Generalized anxiety disorder  F41.1 300.02   3. Medication management  Z79.899 V58.69             PLAN:  1.   Safety: No acute safety concerns  Therapy: None  Risk Assessment: Risk of self-harm acutely is moderate.   Risk factors include anxiety disorder,family history, access to guns/weapons, and recent psychosocial stressors (pandemic). Protective factors include no present SI, no history of suicide attempts or self-harm in the past, minimal AODA, healthcare seeking, future orientation, willingness to engage in care.  Risk of self-harm chronically is also moderate, but could be further elevated in the event of treatment noncompliance and/or AODA.  Meds:    -Continue Zoloft 50 mg by mouth daily to target anxiety and PTSD.      -Continue Vyvanse 40 mg by mouth daily in the morning without food to target symptoms associated with ADHD.  Instructed patient to take on an empty stomach to avoid delayed onset of action, and may eat 20-30 minutes after taking medication, though if unable to tolerate or wait to eat to expect the onset of action to be delayed.  Risks, benefits, and alternatives discussed with patient including insomnia, headache, irritability, overstimulation, tremor, dizziness, nausea, dry mouth, constipation, diarrhea, weight loss, sexual dysfunction, heart palpitations, elevated heart rate, elevated blood pressure, seizures and suicidal ideations though rare.  After discussion of these risks patient agreed to proceed with Vyvanse. Discussed all questions and concerns.  Patient instructed to contact provider immediately with any intolerable side effects, cardiac symptoms, suicidal ideations,  or worsening anxiety. Last dispensed 6/25/24 #30/30 days. Pill count today #1. Informed patient order would be sent to Dr. Singh in separate entry for signature due to EMR system, and will not see as refilled on AVS today.     Controlled substance documentation: [place] reviewed; prior urine drug screen consistent obtained 11/3/23; consent is up to date, signed, witnessed and in EHR, dated today 11/3/23, which will be updated annually per policy. Patient is aware of risk of addiction on this medication, understands need to follow  up for a review every 3 months and medications will be adjusted or decreased as deemed appropriate at each visit.  No history of drug or alcohol abuse.  No concerns about diversion or abuse. Patient denies side effects related to the medication.  Patient is aware of random urine drug screens and pill counts. The dosing of this medication will be reviewed on a regular basis and reduced if possible. Ongoing use of a controlled substance is necessary for this patient to have a normal quality of life.  Labs: n/a    Symptoms of anxiety and ADHD are under good control with current medication regimen.  Next visit will be in the office for annual CSA.   The plan was discussed with the patient. The patient was given time to ask questions and these questions were answered. At the conclusion of their visit they had no additional questions or concerns and all questions were answered to their satisfaction.   Patient was given instructions and counseling regarding condition and for health maintenance advice. Please see specific information pulled into the AVS if appropriate.    Patient to contact provider if symptoms worsen or fail to improve.      5/2/24:   -Continue Zoloft 50 mg by mouth daily to target anxiety and PTSD.    -Continue Vyvanse 40 mg by mouth daily in the morning without food to target symptoms associated with ADHD.  Instructed patient to take on an empty stomach to avoid delayed onset of action, and may eat 20-30 minutes after taking medication, though if unable to tolerate or wait to eat to expect the onset of action to be delayed.   Last dispensed 4/5/24 #30/30 days. Patient to request refill when needed via [employer]. Instructed patient to write a note upon the refill request if he wishes to have rx sent to [facility] and indicate 90 days, as patient plans to contact mail order pharmacy to determine availability of quantity.     Symptoms of anxiety, ADHD are under good control with current medication  regimen.    Patient was given instructions and counseling regarding condition and for health maintenance advice. Please see specific information pulled into the AVS if appropriate.    Patient to contact provider if symptoms worsen or fail to improve.      2/2/24:   -Continue Zoloft 50 mg by mouth daily to target anxiety and PTSD.    -Continue Vyvanse 40 mg by mouth daily in the morning without food to target symptoms associated with ADHD. Last dispensed 1/26/24 #30/30 days. Patient to request refill when needed via [employer].     Symptoms of anxiety and ADHD are under good control with current medication regimen.  Updated pharmacy to [facility] per patient request which is mail order, secure email sent to patient email which was verified for patient to inform provider of whether or not the mail order pharmacy requires 90 days for the Vyvanse, and how soon the new prescription needs to be sent to ensure no missed doses and the turn around time, as patient was instructed to contact them and get back to provider. Will supply 90 days if pharmacy will fill as in the past the local pharmacy would not fill due to nationwide shortages.   Patient was given instructions and counseling regarding condition and for health maintenance advice. Please see specific information pulled into the AVS if appropriate.    Patient to contact provider if symptoms worsen or fail to improve.        11/3/23:  -Continue Zoloft 50 mg by mouth daily to target anxiety and PTSD.    -Continue Vyvanse 40 mg by mouth daily in the morning without food to target symptoms associated with ADHD.  Instructed patient to take on an empty stomach to avoid delayed onset of action, and may eat 20-30 minutes after taking medication, though if unable to tolerate or wait to eat to expect the onset of action to be delayed.  Risks, benefits, and alternatives discussed with patient including insomnia, headache, irritability, overstimulation, tremor, dizziness, nausea,  dry mouth, constipation, diarrhea, weight loss, sexual dysfunction, heart palpitations, elevated heart rate, elevated blood pressure, seizures and suicidal ideations though rare.  After discussion of these risks patient agreed to proceed with Vyvanse. Discussed all questions and concerns.  Patient instructed to contact provider immediately with any intolerable side effects, cardiac symptoms, suicidal ideations,  or worsening anxiety. Last dispensed 10/05/23 #30/30 days. Informed patient order would be sent to Dr. Singh in separate entry for signature due to EMR system, and will not see as refilled on AVS today.  Will send in for 90 day supply with first fill date of Monday 11/6/23, patient is now able to request refills via [employer].     Controlled substance documentation: [place] reviewed; prior urine drug screen consistent obtained 11/3/22, ordered for today; consent is up to date, signed, witnessed and in EHR, dated today 11/3/23, which will be updated annually per policy. Patient is aware of risk of addiction on this medication, understands need to follow up for a review every 3 months and medications will be adjusted or decreased as deemed appropriate at each visit.  No history of drug or alcohol abuse.  No concerns about diversion or abuse. Patient denies side effects related to the medication.  Patient is aware of random urine drug screens and pill counts. The dosing of this medication will be reviewed on a regular basis and reduced if possible. Ongoing use of a controlled substance is necessary for this patient to have a normal quality of life.  Labs: POC UDS today in clinic  Symptoms of ADHD are under good control with current medication regimen.    Patient was given instructions and counseling regarding condition and for health maintenance advice. Please see specific information pulled into the AVS if appropriate.    Patient to contact provider if symptoms worsen or fail to improve.      8/16/23:   Continue  Zoloft 50 mg by mouth daily to target anxiety.      Continue Vyvanse 40 mg by mouth daily in the morning without food to target symptoms associated with ADHD.  Instructed patient to take on an empty stomach to avoid delayed onset of action, and may eat 20-30 minutes after taking medication, though if unable to tolerate or wait to eat to expect the onset of action to be delayed.  Risks, benefits, and alternatives discussed with patient including insomnia, headache, irritability, overstimulation, tremor, dizziness, nausea, dry mouth, constipation, diarrhea, weight loss, sexual dysfunction, heart palpitations, elevated heart rate, elevated blood pressure, seizures and suicidal ideations though rare.  After discussion of these risks patient agreed to proceed with Vyvanse. Discussed all questions and concerns.  Patient instructed to contact provider immediately with any intolerable side effects, cardiac symptoms, suicidal ideations,  or worsening anxiety. Last dispensed 7/19/23 #30/30 days. Informed patient order would be sent to Dr. Singh in separate entry for signature due to EMR system, and will not see as refilled on AVS today.  With first fill date of 8/17/23.     Symptoms of ADHD are under good control with current medication regimen.  Next visit will be in the office for annual CSA and UDS, scheduled for Friday Nov. 3, 2023 at 8 am.   Patient was given instructions and counseling regarding condition and for health maintenance advice. Please see specific information pulled into the AVS if appropriate.    Patient to contact provider if symptoms worsen or fail to improve.         7/19/23:  Continue Zoloft 50 mg by mouth daily to target anxiety.  Refilled today for 90 days with 3 refills.    Continue Vyvanse 40 mg by mouth daily in the morning without food to target symptoms associated with ADHD. Last dispensed 6/15/23 #30/30 days.  Informed patient order would be sent to Dr. Lyles in separate entry for signature  due to EMR system, and will not see as refilled on AVS today, as Dr. Singh is out of the office.    Symptoms of anxiety and ADHD are under good control with current medication regimen.  Discussed option for a 90 day supply once long term dose determined, as patient started 40 mg of Vyvanse 23, will see back in 4 weeks to determine if further dose adjustments are needed.  Patient informed current CSA will  in Nov. 3 And will need to be seen in office by or before Nov. 3, 2023.   Patient was given instructions and counseling regarding condition and for health maintenance advice. Please see specific information pulled into the AVS if appropriate.    Patient to contact provider if symptoms worsen or fail to improve.         23:  -Continue Zoloft 50 mg by mouth daily to target anxiety.  Due to upcoming travel to [place] and [place] from 23-23, will send in a quantity of 4 to [facility] pharmacy today, will fill for 90 days at next appointment.   -Continue Vyvanse 40 mg by mouth daily in the morning without food to target symptoms associated with ADHD.  Instructed patient to take on an empty stomach to avoid delayed onset of action, and may eat 20-30 minutes after taking medication, though if unable to tolerate or wait to eat to expect the onset of action to be delayed.  Risks, benefits, and alternatives discussed with patient including insomnia, headache, irritability, overstimulation, tremor, dizziness, nausea, dry mouth, constipation, diarrhea, weight loss, sexual dysfunction, heart palpitations, elevated heart rate, elevated blood pressure, seizures and suicidal ideations though rare.  After discussion of these risks patient agreed to proceed with Vyvanse. Discussed all questions and concerns.  Patient instructed to contact provider immediately with any intolerable side effects, cardiac symptoms, suicidal ideations,  or worsening anxiety. Last dispensed 6/15/23 # days. Due  to upcoming travel to [place] and [place] from 6/30/23-7/18/23, will send in a quantity of 4 to [facility] pharmacy today with allowed early fill.  Informed patient order would be sent to Dr. Singh in separate entry for signature due to EMR system, and will not see as refilled on AVS today.     Controlled substance documentation: [place] reviewed; prior urine drug screen consistent obtained 11/3/22; consent is up to date, signed, witnessed and in EHR, dated 11/3/22, Vyvanse consent for treatment form dated 4/12/23, which will be updated annually per policy. Patient is aware of risk of addiction on this medication, understands need to follow up for a review every 3 months and medications will be adjusted or decreased as deemed appropriate at each visit.  No history of drug or alcohol abuse.  No concerns about diversion or abuse. Patient denies side effects related to the medication.  Patient is aware of random urine drug screens and pill counts. The dosing of this medication will be reviewed on a regular basis and reduced if possible. Ongoing use of a controlled substance is necessary for this patient to have a normal quality of life.  Symptoms of ADHD and anxiety are under good control with current medication regimen. Tolerating well without reported side effects.   Patient to contact provider if symptoms worsen or fail to improve.        5/12/23:  Symptoms of ADHD are under fair control with Vyvanse 30 mg, will increase dose today to 40 mg. Last dispensed 4/12/23 #30/30 days. Tolerating well without reported side effects.   Patient to contact provider if symptoms worsen or fail to improve.        4/12/23:   -Continue Zoloft 50 mg by mouth daily to target anxiety.    -Stop Azstarys (Serdexmethylphenidate and dexmethylphenidate) 26.1 mg-5.2 mg due to cost of medication.   -Start  Vyvanse 30 mg by mouth daily in the morning without food to target symptoms associated with ADHD.  Instructed patient to take  on an empty stomach to avoid delayed onset of action, and may eat 20-30 minutes after taking medication, though if unable to tolerate or wait to eat to expect the onset of action to be delayed.  Risks, benefits, and alternatives discussed with patient including insomnia, headache, irritability, overstimulation, tremor, dizziness, nausea, dry mouth, constipation, diarrhea, weight loss, sexual dysfunction, heart palpitations, elevated heart rate, elevated blood pressure, seizures and suicidal ideations though rare.  After discussion of these risks patient agreed to proceed with Vyvanse. Discussed all questions and concerns.  Patient instructed to contact provider immediately with any intolerable side effects, cardiac symptoms, suicidal ideations,  or worsening anxiety.     Informed patient order would be sent to Dr. Singh in separate entry for signature due to EMR system, and will not see as refilled on AVS today.  Instructed patient to come by [facility] office today as he planned to sign medication portion controlled substance for Azstarys and Vyvanse.     Symptoms of ADHD are not under good control with Azstarys and due to high cost patient wishes to switch to Vyvanse.   Contacted [facility] office staff and instructed staff to place another controlled substance consent to treat with Vyvanse and place in provider box for patient to sign both forms today.  Will send Vyvanse to [facility] as patient is going there today. Patient to contact provider if symptoms worsen or fail to improve.     3/15/23:  Continue Zoloft 50 mg by mouth daily to target anxiety.    -Discontinue Adderall XR 20 mg due to nationwide shortage patient has not been able to locate a pharmacy that has medication in stock, and has not had medication for 1 month.  last dispensed 12/28/22 #30/30 days.      Start Azstarys (Serdexmethylphenidate and dexmethylphenidate) 26.1 mg-5.2 mg by mouth daily in the morning on an empty stomach (may eat in  "20-30 minutes to help with absorption) to target symptoms associated with ADHD. Risks, benefits, side effects discussed with patient including elevated heart rate, elevated blood pressure, irritability, insomnia, sexual dysfunction, appetite suppressing properties, psychosis. After discussion of these risks and benefits, the patient voiced understanding and agreed to proceed. Informed patient order would be sent to Dr. Singh in separate entry for signature due to EMR system, and will not see as refilled on AVS today.  Instructed patient to come by [facility] within the next week to sign medication portion controlled substance for Azstarys.     Due to nationwide shortage of Adderall, discussed various options for ADHD treatment, patient willing to switch to alternate medication. Symptoms of ADHD remain present as patient has not had medication for at least 1 month.  Patient to contact provider if unable to  medication by tomorrow, given direct contact number for MA at [facility].  Patient to contact provider if symptoms worsen or fail to improve.       2/9/23:   Your request has been approved  Your PA request has been approved. Additional information will be provided in the approval communication. **LPN Reviewed- Complete Clinical**COT, paid claim//MDO verified information// Last paid claim 12-// Adderall XR 20 MG #30/30ds//F90.0 Attn-defct hyperactivity disorder, predom inattentive type//75 Prior Authorization Reqrd +MUST Use generics, AZSTARYS, JORNAY PM, MYDAYIS, VYVANSE MEDICAL NECESSITY PA ONLY [phone] Drug Requires Prior Authorization// Approve, 36 months. -TC, LPN       ADDERALL XR 20MG            11:56 AM    Serene Salazar MA contacted Thomas Lei \"Renard\"    Serene Salazar MA         11:58 AM  Note    Called patient to let him know that we have received the approval for his Adderall XR 20mg        2/7/23:  Continue Zoloft 50 mg by mouth daily to target anxiety.  "     Continue Adderall XR 20 mg by mouth daily in the morning to target symptoms associated with ADHD. Risks, benefits, side effects discussed with patient including elevated heart rate, elevated blood pressure, irritability, insomnia, sexual dysfunction, appetite suppressing properties, psychosis.  After discussion of these risks and benefits, the patient voiced understanding and agreed to proceed. [place] reviewed, last dispensed 12/28/22 #30/30 days.  New order for 20 mg dose sent 1/26/23 to [facility] pharmacy due to [facility] pharmacy out of stock, however, awaiting PA from insurance at this time.     Patient noted with improvement of ADHD symptoms with dose increase of Adderall XR to 20 mg, however, PA is required which was reviewed with patient during visit.  Patient given detailed information to follow up with insurance company to determine specifics regarding which and how many medications have to be tried before restarting current dose of Adderall and contact office to MA directly later today.  Patient was not allowed option to pay out of pocket from pharmacy.  Will be in touch with patient later today.        2/6/23:  VICENTA LEI Key: [id number] - PA Case ID: [id number] - Rx #: [id number]  PA for Adderall XR 20mg sent to plan 02/06/2023 awaiting decision from insurance company      2/2/23: [employer] message from patient wife to PCP which was directed to this provider:   This message is being sent by Cindi Lei on behalf of Vicenta Lei.   Hi Dr. Haskins,     This is Cindi Lei on Vicenta Lei account. I am trying to  his medication, the generic Adderral and they are saying the extended release capsules are on backorder. The only thing the have in stock are non extended release tablets. Are you able to switch the prescription to that so it can be filled until the other kind comes in? [facility]. Thank you!  Patient is aware that communication with behavioral health has to  be done via telephone, and when the pharmacy does not have supply in stock, as this has happened before, he is to contact other pharmacies to determine availability and then inform the office.  The prescription was ordered 1/26/23 which was 7 days ago, patient will need to follow process per CSA.         1/25/23:   After Rescheduling patient today he wanted me to let Celina know that the Adderall XR 20 mg seems to be working well, but he did DC the Wellbutrin due to excessive restlessness.  I rescheduled patient to February 07/2023.      12/28/22:   -Continue Zoloft 50 mg by mouth daily to target anxiety.    -Continue Wellbutrin  mg po daily in the morning to target attention and concentration deficit.      Increase Adderall XR from 15 mg TO 20 mg by mouth daily in the morning to target symptoms associated with ADHD. Risks, benefits, side effects discussed with patient including elevated heart rate, elevated blood pressure, irritability, insomnia, sexual dysfunction, appetite suppressing properties, psychosis.  After discussion of these risks and benefits, the patient voiced understanding and agreed to proceed. Channing reviewed, last dispensed 15 mg 11/30/22 #30/30 days.  Informed patient order would be sent to JUAN Lang in practice, as  is out of office today, in separate entry for signature due to EMR system, and will not see as refilled on AVS today.  Adderall XR 15 mg providing inconsistent relief of ADHD symptoms, denies side effects, will increase dose today and have patient return in 3 weeks, as patient will be returning to work before 4 weeks from today.  Patient to track daily symptoms, activities, ability to complete tasks and report at next appointment.  Patient instructed to notify provider if experiencing insomnia as combination with Wellbutrin XL may effect sleep and dose may need to be lowered.  Patient to contact provider if symptoms worsen or fail to improve.        11/30/22:  Continue Zoloft 50 mg by mouth daily to target anxiety.      Continue Wellbutrin  mg po daily in the morning to target attention and concentration deficit.  Instructed patient to request refill in 2-3 weeks from pharmacy if continues to tolerate with Adderall XR, otherwise to call office and request dose decrease to 150 mg.   Increase Adderall XR from 10 mg TO 15 mg by mouth daily in the morning to target symptoms associated with ADHD. Risks, benefits, side effects discussed with patient including elevated heart rate, elevated blood pressure, irritability, insomnia, sexual dysfunction, appetite suppressing properties, psychosis.  After discussion of these risks and benefits, the patient voiced understanding and agreed to proceed. Channing reviewed, last dispensed 10 mg dose 11/3/22 #30/30 days.  Informed patient order would be sent to Dr. Singh in separate entry for signature due to EMR system, and will not see as refilled on AVS today.  Controlled substance documentation: Channing reviewed; prior urine drug screen consistent obtained 11/3/22; consent is up to date, signed, witnessed and in EHR, dated 11/3/22, which will be updated annually per policy. Patient is aware of risk of addiction on this medication, understands need to follow up for a review every 3 months and medications will be adjusted or decreased as deemed appropriate at each visit.  No history of drug or alcohol abuse.  No concerns about diversion or abuse. Patient denies side effects related to the medication.  Patient is aware of random urine drug screens and pill counts. The dosing of this medication will be reviewed on a regular basis and reduced if possible..  Ongoing use of a controlled substance is necessary for this patient to have a normal quality of life.    Anxiety symptoms are under good control with Zoloft and Wellbutrin XL.  ADHD symptoms remain present, minimal effectiveness with 10 mg dose of Adderall XR, tolerating  well without side effects, will increase dose today and send to [facility] pharmacy, patient instructed to contact either [place] office and leave vm with MA and if not heard back within 1-2 hrs to call the [place] office, or patient may call the [facility] office initially as MA is not physically in the [place] office today though checks vm.  Patient verbalized understanding. Patient to contact provider if symptoms worsen or fail to improve.     11/3/22:   Patient called to inform [facility], primary pharmacy, does not have Adderall XR 10 mg in stock, patient was instructed to call other local pharmacies to check availability.   Patient called back to tell us that [facility].  Please send there Pharmacy changed and med pended in chart    11/3/22:  Continue Zoloft 50 mg by mouth daily to target anxiety.      Continue Wellbutrin  mg po daily in the morning to target attention and concentration deficit.      Start Adderall XR 10 mg by mouth daily in the morning to target symptoms associated with ADHD. Risks, benefits, side effects discussed with patient including elevated heart rate, elevated blood pressure, irritability, insomnia, sexual dysfunction, appetite suppressing properties, psychosis.  After discussion of these risks and benefits, the patient voiced understanding and agreed to proceed. Channing reviewed, UDS ordered, and controlled substance agreement signed & witnessed. Informed patient prescription would not be ordered until UDS results noted as negative, expected results within 30 minutes of providing sample.   POC UDS today in clinic  ADHD testing results reviewed from Dr. Eladio Dunn,[facility] dated 9/29/2022 indicating a diagnosis of RAFAEL and ADHD.     Patient to request refills for Wellbutrin XL and Zoloft in approximately 2 weeks, as patient has been educated he may need to decrease Wellbutrin XL dose due to addition of Adderall XR.  Patient to  contact provider if symptoms worsen or fail to improve.     10/3/22:   -Continue Zoloft 50 mg by mouth daily to target anxiety.    -Continue Wellbutrin  mg po daily in the morning to target attention and concentration deficit.     Patient denies need for refills at this time, tolerating current medications well without reported side effects.  Patient reported completing ADHD testing last week with Dr. Eladio Dunn, report not expected for at least 2 weeks. Will plan on next appointment in 4 weeks in person due to possible stimulant medication needed, as patient will be starting an new job Nov 6 and would prefer to come in the week prior.  If ADHD report findings indicate no formal diagnosis of ADHD will contact patient prior to next appointment.  Patient to contact provider if symptoms worsen or fail to improve.       7/26/22:   Declined therapy  Continue Zoloft 50 mg by mouth daily to target anxiety.  Risks, benefits, alternatives discussed with patient including GI upset, nausea vomiting diarrhea, theoretical decrease of seizure threshold predisposing the patient to a slightly higher seizure risk, headaches, sexual dysfunction, serotonin syndrome, bleeding risk, increased suicidality in patients 24 years and younger.  After discussion of these risks and benefits, the patient voiced understanding and agreed to proceed.  Increase Wellbutrin XL from 150 mg to 300 mg (instructed to start taking 2 tabs of the 150 mg on hand until new prescription picked up) po daily in the morning to target attention and concentration deficit. Discussed all risks, benefits, alternatives, and side effects of Bupropion/Wellbutrin including but not limited to GI upset (N/V/D, constipation), tachycardia, diaphoresis, weight loss, agitation, dizziness, headache, insomnia, tremor, blurred vision, anorexia, HTN, activation of char or hypomania, CNS stimulation and neuropsychiatric effects, ocular effects, seizure risk, withdrawal  syndrome following abrupt discontinuation, and activation of suicidal ideation and behavior. Patient  educated on the need to practice safe sex while taking this med. Discussed the need for patient to immediately call the office for any new or worsening symptoms, such as worsening depression; feeling nervous or restless; suicidal thoughts or actions; or other changes in mood or behavior, and all other concerns. Patient educated on medication compliance. Patient  verbalized understanding and is agreeable to taking Bupropion/Wellbutrin. Addressed all questions and concerns.     Referral for ADHD testing.  Patient to contact provider and set up appointment.  And to contact office if unable to get an appointment scheduled. -Attached list of several other sites for ADHD testing. Patient instructed to contact providers on list to determine availability of appointments, instructed patient to take notes while calling places indicating first available appointment, and to ask to be placed on waiting list.  If an office is chosen and requests the referral order please contact my Medical Assistant, Serene, directly at [phone] informing of chosen office and the information will be sent.    Dr. Eladio Dunn, Psychologist, MS, LPP  [address], [address]  [place] [id number] [phone]   Currently only accepting referrals for psychological testing services.  Accepts Most insurance plans except Medicare Plans    Patient presentation seems most consistent with anxiety vs ADHD, due to high suspicion of ADHD will refer for formal testing.  Increased Wellbutrin XL today , will see patient back in 4 weeks.       Patient screened positive for depression based on a PHQ-9 score of 0 on 2/2/2024. Follow-up recommendations include: Prescribed antidepressant medication treatment and Suicide Risk Assessment performed.     TREATMENT PLAN/GOALS: Continue supportive psychotherapy efforts and medications as indicated.  Treatment and medication options discussed during today's visit. Patient ackowledged and verbally consented to continue with current treatment plan and was educated on the importance of compliance with treatment and follow-up appointments.    MEDICATION ISSUES:  ANUSHA reviewed as expected.  Discussed medication options and treatment plan of prescribed medication as well as the risks, benefits, and side effects including potential falls, possible impaired driving and metabolic adversities among others. Patient is agreeable to call the office with any worsening of symptoms or onset of side effects. Patient is agreeable to call 911 or go to the nearest ER should he/she begin having SI/HI. No medication side effects or related complaints today.     MEDS ORDERED DURING VISIT:  No orders of the defined types were placed in this encounter.      Return in about 3 months (around 11/2/2024) for medication check.         I spent 17 minutes caring for Thomas on this date of service. This time includes time spent by me in the following activities: preparing for the visit, performing a medically appropriate examination and/or evaluation, counseling and educating the patient/family/caregiver, ordering medications, tests, or procedures, referring and communicating with other health care professionals, documenting information in the medical record, care coordination, and scheduling .  (Ordering of medications will be seen in a separate encounter due to EHR for Vyvanse)     This document has been electronically signed by JUAN Hayes  August 2, 2024 08:17 EDT      Part of this note may be an electronic transcription/translation of spoken language to printed text using the Dragon Dictation System.

## 2024-08-02 NOTE — PATIENT INSTRUCTIONS
"     SPECIFIC RECOMMENDATIONS:     1.      Medications discussed at this encounter:                   -  Refill for Vyvanse 40 mg sent to Dr. Singh for signature to [facility] pharmacy    2.      Psychotherapy recommendations: Declined     3.     Return to clinic: 3 months in office visit for annual controlled substance agreement, Tues. 11/5/24 at 840 am in office    Please arrive at least 15 minutes before your scheduled appointment time to complete check in process.      IF you are scheduled for a [email or web address] VIDEO visit, YOU MUST COMPLETE THE \"E-CHECK IN\" PROCESS PRIOR TO BEGINNING THE VISIT, YOU WILL NO LONGER RECEIVE A PHONE CALL PRIOR TO ALL VIDEO VISITS; You may still complete the E-Check in for in office visits prior to appointment, you will receive multiple text/email reminders which will direct you further if needed.           "

## 2024-09-10 DIAGNOSIS — F90.0 ADHD (ATTENTION DEFICIT HYPERACTIVITY DISORDER), INATTENTIVE TYPE: ICD-10-CM

## 2024-09-10 RX ORDER — LISDEXAMFETAMINE DIMESYLATE 40 MG/1
40 CAPSULE ORAL EVERY MORNING
Qty: 30 CAPSULE | Refills: 0 | Status: SHIPPED | OUTPATIENT
Start: 2024-09-11 | End: 2024-09-16 | Stop reason: SDUPTHER

## 2024-09-10 NOTE — TELEPHONE ENCOUNTER
Med Refill Request. Patient is scheduled for follow up 11/05/2024.  Med pended please review.  Patient reports he is out of medication but has never requested refill since 8/2/2024.

## 2024-09-16 ENCOUNTER — TELEPHONE (OUTPATIENT)
Dept: BEHAVIORAL HEALTH | Facility: CLINIC | Age: 34
End: 2024-09-16
Payer: COMMERCIAL

## 2024-09-16 DIAGNOSIS — F90.0 ADHD (ATTENTION DEFICIT HYPERACTIVITY DISORDER), INATTENTIVE TYPE: ICD-10-CM

## 2024-09-16 RX ORDER — LISDEXAMFETAMINE DIMESYLATE 40 MG/1
40 CAPSULE ORAL EVERY MORNING
Qty: 30 CAPSULE | Refills: 0 | Status: SHIPPED | OUTPATIENT
Start: 2024-09-17

## 2024-09-18 ENCOUNTER — TELEPHONE (OUTPATIENT)
Dept: PSYCHIATRY | Facility: CLINIC | Age: 34
End: 2024-09-18
Payer: COMMERCIAL

## 2024-10-22 DIAGNOSIS — F41.1 GENERALIZED ANXIETY DISORDER: ICD-10-CM

## 2024-10-22 DIAGNOSIS — F90.0 ADHD (ATTENTION DEFICIT HYPERACTIVITY DISORDER), INATTENTIVE TYPE: ICD-10-CM

## 2024-10-22 RX ORDER — LISDEXAMFETAMINE DIMESYLATE 40 MG/1
40 CAPSULE ORAL EVERY MORNING
Qty: 30 CAPSULE | Refills: 0 | Status: SHIPPED | OUTPATIENT
Start: 2024-10-23

## 2024-10-22 NOTE — TELEPHONE ENCOUNTER
"Med refill request.  Patient advises \"OUT\" Med pended Please review Patient has appt scheduled for 11/.05/2024  "

## 2024-10-22 NOTE — TELEPHONE ENCOUNTER
"Med Refill Request. Patient has appt scheduled for 11/05/2024. Med pended Please review Patient comment: \"OUT\".  "

## 2024-11-05 ENCOUNTER — OFFICE VISIT (OUTPATIENT)
Dept: BEHAVIORAL HEALTH | Facility: CLINIC | Age: 34
End: 2024-11-05
Payer: COMMERCIAL

## 2024-11-05 ENCOUNTER — CLINICAL SUPPORT (OUTPATIENT)
Dept: FAMILY MEDICINE CLINIC | Age: 34
End: 2024-11-05
Payer: COMMERCIAL

## 2024-11-05 ENCOUNTER — LAB (OUTPATIENT)
Dept: LAB | Facility: HOSPITAL | Age: 34
End: 2024-11-05
Payer: COMMERCIAL

## 2024-11-05 VITALS
HEART RATE: 98 BPM | SYSTOLIC BLOOD PRESSURE: 110 MMHG | HEIGHT: 76 IN | WEIGHT: 188.4 LBS | DIASTOLIC BLOOD PRESSURE: 70 MMHG | BODY MASS INDEX: 22.94 KG/M2

## 2024-11-05 DIAGNOSIS — Z79.899 HIGH RISK MEDICATION USE: ICD-10-CM

## 2024-11-05 DIAGNOSIS — Z79.899 ENCOUNTER FOR MONITORING STIMULANT THERAPY: ICD-10-CM

## 2024-11-05 DIAGNOSIS — F33.42 RECURRENT MAJOR DEPRESSIVE DISORDER, IN FULL REMISSION: ICD-10-CM

## 2024-11-05 DIAGNOSIS — Z51.81 ENCOUNTER FOR MONITORING STIMULANT THERAPY: ICD-10-CM

## 2024-11-05 DIAGNOSIS — F41.1 GENERALIZED ANXIETY DISORDER: ICD-10-CM

## 2024-11-05 DIAGNOSIS — Z79.899 CONTROLLED SUBSTANCE AGREEMENT SIGNED: ICD-10-CM

## 2024-11-05 DIAGNOSIS — Z79.899 MEDICATION MANAGEMENT: ICD-10-CM

## 2024-11-05 DIAGNOSIS — F90.0 ADHD (ATTENTION DEFICIT HYPERACTIVITY DISORDER), INATTENTIVE TYPE: Primary | ICD-10-CM

## 2024-11-05 LAB
ALBUMIN SERPL-MCNC: 4.4 G/DL (ref 3.5–5.2)
ALBUMIN/GLOB SERPL: 1.8 G/DL
ALP SERPL-CCNC: 89 U/L (ref 39–117)
ALT SERPL W P-5'-P-CCNC: 15 U/L (ref 1–41)
AMPHET+METHAMPHET UR QL: POSITIVE
AMPHETAMINES UR QL: NEGATIVE
ANION GAP SERPL CALCULATED.3IONS-SCNC: 11.1 MMOL/L (ref 5–15)
AST SERPL-CCNC: 24 U/L (ref 1–40)
BARBITURATES UR QL SCN: NEGATIVE
BASOPHILS # BLD AUTO: 0.07 10*3/MM3 (ref 0–0.2)
BASOPHILS NFR BLD AUTO: 1.1 % (ref 0–1.5)
BENZODIAZ UR QL SCN: NEGATIVE
BILIRUB SERPL-MCNC: 0.3 MG/DL (ref 0–1.2)
BUN SERPL-MCNC: 11 MG/DL (ref 6–20)
BUN/CREAT SERPL: 11.8 (ref 7–25)
BUPRENORPHINE SERPL-MCNC: NEGATIVE NG/ML
CALCIUM SPEC-SCNC: 9.3 MG/DL (ref 8.6–10.5)
CANNABINOIDS SERPL QL: NEGATIVE
CHLORIDE SERPL-SCNC: 104 MMOL/L (ref 98–107)
CO2 SERPL-SCNC: 25.9 MMOL/L (ref 22–29)
COCAINE UR QL: NEGATIVE
CREAT SERPL-MCNC: 0.93 MG/DL (ref 0.76–1.27)
DEPRECATED RDW RBC AUTO: 40.7 FL (ref 37–54)
EGFRCR SERPLBLD CKD-EPI 2021: 111.9 ML/MIN/1.73
EOSINOPHIL # BLD AUTO: 0.68 10*3/MM3 (ref 0–0.4)
EOSINOPHIL NFR BLD AUTO: 10.7 % (ref 0.3–6.2)
ERYTHROCYTE [DISTWIDTH] IN BLOOD BY AUTOMATED COUNT: 12.6 % (ref 12.3–15.4)
EXPIRATION DATE: ABNORMAL
GLOBULIN UR ELPH-MCNC: 2.5 GM/DL
GLUCOSE SERPL-MCNC: 102 MG/DL (ref 65–99)
HCT VFR BLD AUTO: 40.2 % (ref 37.5–51)
HGB BLD-MCNC: 13.4 G/DL (ref 13–17.7)
IMM GRANULOCYTES # BLD AUTO: 0.01 10*3/MM3 (ref 0–0.05)
IMM GRANULOCYTES NFR BLD AUTO: 0.2 % (ref 0–0.5)
LYMPHOCYTES # BLD AUTO: 2.19 10*3/MM3 (ref 0.7–3.1)
LYMPHOCYTES NFR BLD AUTO: 34.3 % (ref 19.6–45.3)
Lab: ABNORMAL
MCH RBC QN AUTO: 29 PG (ref 26.6–33)
MCHC RBC AUTO-ENTMCNC: 33.3 G/DL (ref 31.5–35.7)
MCV RBC AUTO: 87 FL (ref 79–97)
MDMA UR QL SCN: NEGATIVE
METHADONE UR QL SCN: NEGATIVE
MONOCYTES # BLD AUTO: 0.43 10*3/MM3 (ref 0.1–0.9)
MONOCYTES NFR BLD AUTO: 6.7 % (ref 5–12)
MORPHINE/OPIATES SCREEN, URINE: NEGATIVE
NEUTROPHILS NFR BLD AUTO: 3 10*3/MM3 (ref 1.7–7)
NEUTROPHILS NFR BLD AUTO: 47 % (ref 42.7–76)
OXYCODONE UR QL SCN: NEGATIVE
PCP UR QL SCN: NEGATIVE
PLATELET # BLD AUTO: 236 10*3/MM3 (ref 140–450)
PMV BLD AUTO: 8.9 FL (ref 6–12)
POTASSIUM SERPL-SCNC: 4.3 MMOL/L (ref 3.5–5.2)
PROT SERPL-MCNC: 6.9 G/DL (ref 6–8.5)
RBC # BLD AUTO: 4.62 10*6/MM3 (ref 4.14–5.8)
SODIUM SERPL-SCNC: 141 MMOL/L (ref 136–145)
T-UPTAKE NFR SERPL: 1.01 TBI (ref 0.8–1.3)
T4 SERPL-MCNC: 6.2 MCG/DL (ref 4.5–11.7)
TSH SERPL DL<=0.05 MIU/L-ACNC: 0.47 UIU/ML (ref 0.27–4.2)
WBC NRBC COR # BLD AUTO: 6.38 10*3/MM3 (ref 3.4–10.8)

## 2024-11-05 PROCEDURE — 84436 ASSAY OF TOTAL THYROXINE: CPT

## 2024-11-05 PROCEDURE — 84479 ASSAY OF THYROID (T3 OR T4): CPT

## 2024-11-05 PROCEDURE — 85025 COMPLETE CBC W/AUTO DIFF WBC: CPT | Performed by: NURSE PRACTITIONER

## 2024-11-05 PROCEDURE — 36415 COLL VENOUS BLD VENIPUNCTURE: CPT | Performed by: NURSE PRACTITIONER

## 2024-11-05 PROCEDURE — 80053 COMPREHEN METABOLIC PANEL: CPT | Performed by: NURSE PRACTITIONER

## 2024-11-05 PROCEDURE — 84443 ASSAY THYROID STIM HORMONE: CPT

## 2024-11-05 NOTE — PROGRESS NOTES
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  [facility] ENDOSCOPY;  Service: Gastroenterology;  Laterality: N/A;  ESOPHAGITIS, GASTRITIS, ESOPHAGEAL STRICTURE    ENDOSCOPY N/A 5/23/2022    Procedure: ESOPHAGOGASTRODUODENOSCOPY WITH 10-15MM BALLOON DILATATION AND BIOPSIES;  Surgeon: Joan Luna MD;  Location: [facility] ENDOSCOPY;  Service: Gastroenterology;  Laterality: N/A;  GASTRITIS, ESOPHAGITIS, ESOPHAGEAL STRICTURE    HERNIA REPAIR      Hernia Repair: at age 17; right inguinal; uncomplicated; dual;    SHOULDER SURGERY      left shoulder labrum repair at age 22 - sports related injury       Problem List:  Patient Active Problem List   Diagnosis    Esophageal dysphagia    Anxiety    Attention and concentration deficit       Allergy:   No Known Allergies     Discontinued Medications:  There are no discontinued medications.        Current Medications:   Current Outpatient Medications   Medication Sig Dispense Refill    lisdexamfetamine (Vyvanse) 40 MG capsule Take 1 capsule by mouth Every Morning Indications: Attention Deficit Hyperactivity Disorder 30 capsule 0    sertraline (ZOLOFT) 50 MG tablet Take 1 tablet by mouth Every Morning 90 tablet 3    TiZANidine (ZANAFLEX) 4 MG capsule Take 1 capsule by mouth Every 6 to 8 hours as needed for muscle spasticity (Patient not taking: Reported on 11/5/2024) 20 capsule 0     No current facility-administered medications for this visit.       Past Medical History:  Past Medical History:   Diagnosis Date    Anxiety disorder, unspecified      Concussion     X3    Dysphagia, unspecified     Fever, unspecified     Head injury     Influenza due to identified novel influenza A virus with other respiratory manifestations     Panic disorder     PONV (postoperative nausea and vomiting)     Procreative counseling and advice using natural family planning     PTSD (post-traumatic stress disorder)        Past Psychiatric History:  Began Treatment: 2021  Diagnoses:Anxiety  Psychiatrist: Denies  Therapist:Denies  Admission History:Denies  Medication Trials: Wellbutrin XL up to 300 mg self stopped 1/2023 due to excessive restlessness with combination of Adderall XR 20 mg  Adderall XR 20 mg eff.due to nationwide shortage stopped 1/2023; Azstarys lowest dose 1 month, due to cost stopping,somewhat eff.  Self Harm: Denies  Suicide Attempts:Denies      Substance Abuse History:   Types:Denies all, including illicit  Withdrawal Symptoms:Denies  Longest Period Sober:Not Applicable   AA: Not applicable     Social History: As of 11/5/24  Martial Status:  Employed:Yes and If so, where [employer] and . 9a-5p  Kids:Yes or If so, how many 2 girls     House:Lives in a house   History:  [id number]  Access to Guns: Yes, put in storage not in home    Social History     Socioeconomic History    Marital status:     Number of children: 2    Years of education: 16    Highest education level: Bachelor's degree (e.g., BA, AB, BS)   Tobacco Use    Smoking status: Never    Smokeless tobacco: Former     Types: Chew     Quit date: 5/21/2022   Vaping Use    Vaping status: Never Used   Substance and Sexual Activity    Alcohol use: Yes     Comment: 1-2 drinks per month     Drug use: Not Currently     Types: Marijuana     Comment: back in high school    Sexual activity: Yes       Family History:   Suicide Attempts:  Brother  Suicide Completions:Denies      Family History   Problem Relation Age of Onset    Sjogren's syndrome  Mother     Diverticulitis Mother     Cardiomyopathy Father     Suicide Attempts Brother     Seizures Brother     ADD / ADHD Brother     ADD / ADHD Brother     Cardiomyopathy Paternal Uncle     Dementia Maternal Grandmother        Developmental History:   Born: [place]  Siblings:5   Childhood: Denies Abuse  High School:Completed  College: Bachelors in K[employer]  kinesiology     Mental Status Exam:   Hygiene:   good  Cooperation:  Cooperative  Eye Contact:  Good  Psychomotor Behavior:  Appropriate  Affect:  Appropriate  Mood: euthymic   Speech:  Normal  Thought Process:  Goal directed  Thought Content:  Mood congruent  Suicidal:  None  Homicidal:  None  Hallucinations:  None  Delusion:  None  Memory:  Intact  Orientation:  Person, Place, Time and Situation  Reliability:  good  Insight:  Good  Judgement:  Good  Impulse Control:  Good  Physical/Medical Issues:  Yes Esophageal Dysphagia      Review of Systems:  Review of Systems   Constitutional:  Negative for diaphoresis and fatigue.   HENT:  Positive for hearing loss and tinnitus. Negative for drooling.         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   Respiratory:  Negative for cough and shortness of breath.    Cardiovascular:  Negative for chest pain, palpitations and leg swelling.   Gastrointestinal:  Negative for nausea and vomiting.   Endocrine: Negative for cold intolerance and heat intolerance.   Genitourinary:  Negative for difficulty urinating.   Musculoskeletal:  Negative for joint swelling.   Allergic/Immunologic: Negative for immunocompromised state.   Neurological:  Negative for dizziness, tremors, seizures, speech difficulty and numbness.   Psychiatric/Behavioral:  Negative for decreased concentration, hallucinations, self-injury, sleep disturbance and suicidal ideas. The patient is not nervous/anxious and is not hyperactive.          Physical Exam:  Physical Exam  Psychiatric:         Attention and Perception:  "Attention and perception normal.         Mood and Affect: Mood and affect normal.         Speech: Speech normal.         Behavior: Behavior normal. Behavior is cooperative.         Thought Content: Thought content normal. Thought content does not include suicidal ideation. Thought content does not include suicidal plan.         Cognition and Memory: Cognition and memory normal.         Judgment: Judgment normal.         Vital Signs:   /70   Pulse 98   Ht 193 cm (75.98\")   Wt 85.5 kg (188 lb 6.4 oz)   BMI 22.94 kg/m²      Lab Results:  REVIEWED  Office Visit on 11/05/2024   Component Date Value Ref Range Status    Amphetamine Screen, Urine 11/05/2024 Positive (A)  Negative Final    Barbiturates Screen, Urine 11/05/2024 Negative  Negative Final    Buprenorphine, Screen, Urine 11/05/2024 Negative  Negative Final    Benzodiazepine Screen, Urine 11/05/2024 Negative  Negative Final    Cocaine Screen, Urine 11/05/2024 Negative  Negative Final    MDMA (ECSTASY) 11/05/2024 Negative  Negative Final    Methamphetamine, Ur 11/05/2024 Negative  Negative Final    Morphine/Opiates Screen, Urine 11/05/2024 Negative  Negative Final    Methadone Screen, Urine 11/05/2024 Negative  Negative Final    Oxycodone Screen, Urine 11/05/2024 Negative  Negative Final    Phencyclidine (PCP), Urine 11/05/2024 Negative  Negative Final    THC, Screen, Urine 11/05/2024 Negative  Negative Final    Lot Number 11/05/2024 [id number]   Final    Expiration Date 11/05/2024 05-15-26   Final       EKG Results:  No orders to display       Imaging Results:  No Images in the past 120 days found..      Assessment & Plan       Visit Diagnoses:    ICD-10-CM ICD-9-CM   1. ADHD (attention deficit hyperactivity disorder), inattentive type  F90.0 314.00   2. Recurrent major depressive disorder, in full remission  F33.42 296.36   3. Generalized anxiety disorder  F41.1 300.02   4. Controlled substance agreement signed  Z79.899 V58.69   5. High risk medication " use  Z79.899 V58.69   6. Encounter for monitoring stimulant therapy  Z51.81 V58.83    Z79.899 V58.69   7. Medication management  Z79.899 V58.69               PLAN:  1.   Safety: No acute safety concerns  Therapy: None  Risk Assessment: Risk of self-harm acutely is moderate.  Risk factors include anxiety disorder,family history, access to guns/weapons, and recent psychosocial stressors (pandemic). Protective factors include no present SI, no history of suicide attempts or self-harm in the past, minimal AODA, healthcare seeking, future orientation, willingness to engage in care.  Risk of self-harm chronically is also moderate, but could be further elevated in the event of treatment noncompliance and/or AODA.  Meds:    -Continue Zoloft 50 mg by mouth daily to target anxiety and PTSD.      -Continue Vyvanse 40 mg by mouth daily in the morning without food to target symptoms associated with ADHD.  WILL PLAN TO INCREASE DOSE TO 50 mg with next refill request.   Instructed patient to take on an empty stomach to avoid delayed onset of action, and may eat 20-30 minutes after taking medication, though if unable to tolerate or wait to eat to expect the onset of action to be delayed.  Risks, benefits, and alternatives discussed with patient including insomnia, headache, irritability, overstimulation, tremor, dizziness, nausea, dry mouth, constipation, diarrhea, weight loss, sexual dysfunction, heart palpitations, elevated heart rate, elevated blood pressure, seizures and suicidal ideations though rare.  After discussion of these risks patient agreed to proceed with Vyvanse. Discussed all questions and concerns.  Patient instructed to contact provider immediately with any intolerable side effects, cardiac symptoms, suicidal ideations, or worsening anxiety. Last dispensed 10/22/24 #30/30 days. Filled at [facility] due to [facility] pharmacy did not have supply available due to nationwide shortage.  Controlled substance  documentation: [place] reviewed; prior urine drug screen consistent obtained 11/3/23, obtained today 11/5/24; consent is up to date, signed, witnessed and in EHR, dated today 11/5/24, which will be updated annually per policy. Patient is aware of risk of addiction on this medication, understands need to follow up for a review every 3 months and medications will be adjusted or decreased as deemed appropriate at each visit.  No history of drug or alcohol abuse.  No concerns about diversion or abuse. Patient denies side effects related to the medication.  Patient is aware of random urine drug screens and pill counts. The dosing of this medication will be reviewed on a regular basis and reduced if possible. Ongoing use of a controlled substance is necessary for this patient to have a normal quality of life.  Labs: POC UDS today in clinic, results reviewed as expected. CBC, CMP, and Thyroid Panel with TSH as patient has not had routine lab work in several years and no current follow up in EHR to see PCP. Patient has already eaten this morning and a lipid panel was not ordered.     Symptoms of anxiety, depression are under good control with current medication regimen.  ADHD symptoms are under fair control as patient is noticing current dose of Vyvanse is wearing off around 3pm, therefore will increase dose with next refill request and see patient back in 2 months.   The plan was discussed with the patient. The patient was given time to ask questions and these questions were answered. At the conclusion of their visit they had no additional questions or concerns and all questions were answered to their satisfaction.   Patient was given instructions and counseling regarding condition and for health maintenance advice. Please see specific information pulled into the AVS if appropriate.    Patient to contact provider if symptoms worsen or fail to improve.      8/2/24:   -Continue Zoloft 50 mg by mouth daily to target anxiety  and PTSD.    -Continue Vyvanse 40 mg by mouth daily in the morning without food to target symptoms associated with ADHD.  Last dispensed 6/25/24 #30/30 days. Pill count today #1. Informed patient order would be sent to Dr. Singh in separate entry for signature due to EMR system, and will not see as refilled on AVS today.   Symptoms of anxiety and ADHD are under good control with current medication regimen.  Next visit will be in the office for annual CSA.   The plan was discussed with the patient. The patient was given time to ask questions and these questions were answered. At the conclusion of their visit they had no additional questions or concerns and all questions were answered to their satisfaction.   Patient was given instructions and counseling regarding condition and for health maintenance advice. Please see specific information pulled into the AVS if appropriate.    Patient to contact provider if symptoms worsen or fail to improve.      5/2/24:   -Continue Zoloft 50 mg by mouth daily to target anxiety and PTSD.    -Continue Vyvanse 40 mg by mouth daily in the morning without food to target symptoms associated with ADHD.  Instructed patient to take on an empty stomach to avoid delayed onset of action, and may eat 20-30 minutes after taking medication, though if unable to tolerate or wait to eat to expect the onset of action to be delayed.   Last dispensed 4/5/24 #30/30 days. Patient to request refill when needed via [employer]. Instructed patient to write a note upon the refill request if he wishes to have rx sent to [facility] and indicate 90 days, as patient plans to contact mail order pharmacy to determine availability of quantity.     Symptoms of anxiety, ADHD are under good control with current medication regimen.    Patient was given instructions and counseling regarding condition and for health maintenance advice. Please see specific information pulled into the AVS if appropriate.    Patient to  contact provider if symptoms worsen or fail to improve.      2/2/24:   -Continue Zoloft 50 mg by mouth daily to target anxiety and PTSD.    -Continue Vyvanse 40 mg by mouth daily in the morning without food to target symptoms associated with ADHD. Last dispensed 1/26/24 #30/30 days. Patient to request refill when needed via [employer].     Symptoms of anxiety and ADHD are under good control with current medication regimen.  Updated pharmacy to [facility] per patient request which is mail order, secure email sent to patient email which was verified for patient to inform provider of whether or not the mail order pharmacy requires 90 days for the Vyvanse, and how soon the new prescription needs to be sent to ensure no missed doses and the turn around time, as patient was instructed to contact them and get back to provider. Will supply 90 days if pharmacy will fill as in the past the local pharmacy would not fill due to nationwide shortages.   Patient was given instructions and counseling regarding condition and for health maintenance advice. Please see specific information pulled into the AVS if appropriate.    Patient to contact provider if symptoms worsen or fail to improve.        11/3/23:  -Continue Zoloft 50 mg by mouth daily to target anxiety and PTSD.    -Continue Vyvanse 40 mg by mouth daily in the morning without food to target symptoms associated with ADHD.  Instructed patient to take on an empty stomach to avoid delayed onset of action, and may eat 20-30 minutes after taking medication, though if unable to tolerate or wait to eat to expect the onset of action to be delayed.  Risks, benefits, and alternatives discussed with patient including insomnia, headache, irritability, overstimulation, tremor, dizziness, nausea, dry mouth, constipation, diarrhea, weight loss, sexual dysfunction, heart palpitations, elevated heart rate, elevated blood pressure, seizures and suicidal ideations though rare.  After  discussion of these risks patient agreed to proceed with Vyvanse. Discussed all questions and concerns.  Patient instructed to contact provider immediately with any intolerable side effects, cardiac symptoms, suicidal ideations,  or worsening anxiety. Last dispensed 10/05/23 #30/30 days. Informed patient order would be sent to Dr. Singh in separate entry for signature due to EMR system, and will not see as refilled on AVS today.  Will send in for 90 day supply with first fill date of Monday 11/6/23, patient is now able to request refills via [employer].     Controlled substance documentation: [place] reviewed; prior urine drug screen consistent obtained 11/3/22, ordered for today; consent is up to date, signed, witnessed and in EHR, dated today 11/3/23, which will be updated annually per policy. Patient is aware of risk of addiction on this medication, understands need to follow up for a review every 3 months and medications will be adjusted or decreased as deemed appropriate at each visit.  No history of drug or alcohol abuse.  No concerns about diversion or abuse. Patient denies side effects related to the medication.  Patient is aware of random urine drug screens and pill counts. The dosing of this medication will be reviewed on a regular basis and reduced if possible. Ongoing use of a controlled substance is necessary for this patient to have a normal quality of life.  Labs: POC UDS today in clinic  Symptoms of ADHD are under good control with current medication regimen.    Patient was given instructions and counseling regarding condition and for health maintenance advice. Please see specific information pulled into the AVS if appropriate.    Patient to contact provider if symptoms worsen or fail to improve.      8/16/23:   Continue Zoloft 50 mg by mouth daily to target anxiety.      Continue Vyvanse 40 mg by mouth daily in the morning without food to target symptoms associated with ADHD.  Instructed patient to take  on an empty stomach to avoid delayed onset of action, and may eat 20-30 minutes after taking medication, though if unable to tolerate or wait to eat to expect the onset of action to be delayed.  Risks, benefits, and alternatives discussed with patient including insomnia, headache, irritability, overstimulation, tremor, dizziness, nausea, dry mouth, constipation, diarrhea, weight loss, sexual dysfunction, heart palpitations, elevated heart rate, elevated blood pressure, seizures and suicidal ideations though rare.  After discussion of these risks patient agreed to proceed with Vyvanse. Discussed all questions and concerns.  Patient instructed to contact provider immediately with any intolerable side effects, cardiac symptoms, suicidal ideations,  or worsening anxiety. Last dispensed 7/19/23 #30/30 days. Informed patient order would be sent to Dr. Singh in separate entry for signature due to EMR system, and will not see as refilled on AVS today.  With first fill date of 8/17/23.     Symptoms of ADHD are under good control with current medication regimen.  Next visit will be in the office for annual CSA and UDS, scheduled for Friday Nov. 3, 2023 at 8 am.   Patient was given instructions and counseling regarding condition and for health maintenance advice. Please see specific information pulled into the AVS if appropriate.    Patient to contact provider if symptoms worsen or fail to improve.         7/19/23:  Continue Zoloft 50 mg by mouth daily to target anxiety.  Refilled today for 90 days with 3 refills.    Continue Vyvanse 40 mg by mouth daily in the morning without food to target symptoms associated with ADHD. Last dispensed 6/15/23 #30/30 days.  Informed patient order would be sent to Dr. Lyles in separate entry for signature due to EMR system, and will not see as refilled on AVS today, as Dr. Singh is out of the office.    Symptoms of anxiety and ADHD are under good control with current medication regimen.   Discussed option for a 90 day supply once long term dose determined, as patient started 40 mg of Vyvanse 23, will see back in 4 weeks to determine if further dose adjustments are needed.  Patient informed current CSA will  in Nov. 3 And will need to be seen in office by or before Nov. 3, 2023.   Patient was given instructions and counseling regarding condition and for health maintenance advice. Please see specific information pulled into the AVS if appropriate.    Patient to contact provider if symptoms worsen or fail to improve.         23:  -Continue Zoloft 50 mg by mouth daily to target anxiety.  Due to upcoming travel to [place] and [place] from 23-23, will send in a quantity of 4 to [facility] pharmacy today, will fill for 90 days at next appointment.   -Continue Vyvanse 40 mg by mouth daily in the morning without food to target symptoms associated with ADHD.  Instructed patient to take on an empty stomach to avoid delayed onset of action, and may eat 20-30 minutes after taking medication, though if unable to tolerate or wait to eat to expect the onset of action to be delayed.  Risks, benefits, and alternatives discussed with patient including insomnia, headache, irritability, overstimulation, tremor, dizziness, nausea, dry mouth, constipation, diarrhea, weight loss, sexual dysfunction, heart palpitations, elevated heart rate, elevated blood pressure, seizures and suicidal ideations though rare.  After discussion of these risks patient agreed to proceed with Vyvanse. Discussed all questions and concerns.  Patient instructed to contact provider immediately with any intolerable side effects, cardiac symptoms, suicidal ideations,  or worsening anxiety. Last dispensed 6/15/23 #30/30 days. Due to upcoming travel to [place] and [place] from 23-23, will send in a quantity of 4 to [facility] today with allowed early fill.  Informed patient order  would be sent to Dr. Singh in separate entry for signature due to EMR system, and will not see as refilled on AVS today.     Controlled substance documentation: [place] reviewed; prior urine drug screen consistent obtained 11/3/22; consent is up to date, signed, witnessed and in EHR, dated 11/3/22, Vyvanse consent for treatment form dated 4/12/23, which will be updated annually per policy. Patient is aware of risk of addiction on this medication, understands need to follow up for a review every 3 months and medications will be adjusted or decreased as deemed appropriate at each visit.  No history of drug or alcohol abuse.  No concerns about diversion or abuse. Patient denies side effects related to the medication.  Patient is aware of random urine drug screens and pill counts. The dosing of this medication will be reviewed on a regular basis and reduced if possible. Ongoing use of a controlled substance is necessary for this patient to have a normal quality of life.  Symptoms of ADHD and anxiety are under good control with current medication regimen. Tolerating well without reported side effects.   Patient to contact provider if symptoms worsen or fail to improve.        5/12/23:  Symptoms of ADHD are under fair control with Vyvanse 30 mg, will increase dose today to 40 mg. Last dispensed 4/12/23 #30/30 days. Tolerating well without reported side effects.   Patient to contact provider if symptoms worsen or fail to improve.        4/12/23:   -Continue Zoloft 50 mg by mouth daily to target anxiety.    -Stop Azstarys (Serdexmethylphenidate and dexmethylphenidate) 26.1 mg-5.2 mg due to cost of medication.   -Start  Vyvanse 30 mg by mouth daily in the morning without food to target symptoms associated with ADHD.  Instructed patient to take on an empty stomach to avoid delayed onset of action, and may eat 20-30 minutes after taking medication, though if unable to tolerate or wait to eat to expect the onset of action to be  delayed.  Risks, benefits, and alternatives discussed with patient including insomnia, headache, irritability, overstimulation, tremor, dizziness, nausea, dry mouth, constipation, diarrhea, weight loss, sexual dysfunction, heart palpitations, elevated heart rate, elevated blood pressure, seizures and suicidal ideations though rare.  After discussion of these risks patient agreed to proceed with Vyvanse. Discussed all questions and concerns.  Patient instructed to contact provider immediately with any intolerable side effects, cardiac symptoms, suicidal ideations,  or worsening anxiety.     Informed patient order would be sent to Dr. Singh in separate entry for signature due to EMR system, and will not see as refilled on AVS today.  Instructed patient to come by [place] office today as he planned to sign medication portion controlled substance for Azstarys and Vyvanse.     Symptoms of ADHD are not under good control with Azstarys and due to high cost patient wishes to switch to Vyvanse.   Contacted [place] office staff and instructed staff to place another controlled substance consent to treat with Vyvanse and place in provider box for patient to sign both forms today.  Will send Vyvanse to [facility] as patient is going there today. Patient to contact provider if symptoms worsen or fail to improve.     3/15/23:  Continue Zoloft 50 mg by mouth daily to target anxiety.    -Discontinue Adderall XR 20 mg due to nationwide shortage patient has not been able to locate a pharmacy that has medication in stock, and has not had medication for 1 month.  last dispensed 12/28/22 #30/30 days.      Start Azstarys (Serdexmethylphenidate and dexmethylphenidate) 26.1 mg-5.2 mg by mouth daily in the morning on an empty stomach (may eat in 20-30 minutes to help with absorption) to target symptoms associated with ADHD. Risks, benefits, side effects discussed with patient including elevated heart rate, elevated blood  "pressure, irritability, insomnia, sexual dysfunction, appetite suppressing properties, psychosis. After discussion of these risks and benefits, the patient voiced understanding and agreed to proceed. Informed patient order would be sent to Dr. Singh in separate entry for signature due to EMR system, and will not see as refilled on AVS today.  Instructed patient to come by [facility] within the next week to sign medication portion controlled substance for Azstarys.     Due to nationwide shortage of Adderall, discussed various options for ADHD treatment, patient willing to switch to alternate medication. Symptoms of ADHD remain present as patient has not had medication for at least 1 month.  Patient to contact provider if unable to  medication by tomorrow, given direct contact number for MA at [facility].  Patient to contact provider if symptoms worsen or fail to improve.       2/9/23:   Your request has been approved  Your PA request has been approved. Additional information will be provided in the approval communication. 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 -TC, LPN       ADDERALL XR 20MG            11:56 AM    Serene Salazar MA contacted Thomas Lei \"Renard\"    Serene Salazar MA         11:58 AM  Note    Called patient to let him know that we have received the approval for his Adderall XR 20mg        2/7/23:  Continue Zoloft 50 mg by mouth daily to target anxiety.      Continue Adderall XR 20 mg by mouth daily in the morning to target symptoms associated with ADHD. Risks, benefits, side effects discussed with patient including elevated heart " rate, elevated blood pressure, irritability, insomnia, sexual dysfunction, appetite suppressing properties, psychosis.  After discussion of these risks and benefits, the patient voiced understanding and agreed to proceed. Channing reviewed, last dispensed 12/28/22 #30/30 days.  New order for 20 mg dose sent 1/26/23 to [facility] pharmacy due to [facility] pharmacy out of stock, however, awaiting PA from insurance at this time.     Patient noted with improvement of ADHD symptoms with dose increase of Adderall XR to 20 mg, however, PA is required which was reviewed with patient during visit.  Patient given detailed information to follow up with insurance company to determine specifics regarding which and how many medications have to be tried before restarting current dose of Adderall and contact office to MA directly later today.  Patient was not allowed option to pay out of pocket from pharmacy.  Will be in touch with patient later today.        2/6/23:  VICENTA LEI Key: [id number] - PA Case ID: [id number] - Rx #: [id number]  PA for Adderall XR 20mg sent to plan 02/06/2023 awaiting decision from insurance company      2/2/23: Airspan message from patient wife to PCP which was directed to this provider:   This message is being sent by Cindi Lei on behalf of Vicenta Lei.   Hi Dr. Haskins,     This is Cindi Lei on Vicenta Lei account. I am trying to  his medication, the generic Adderral and they are saying the extended release capsules are on backorder. The only thing the have in stock are non extended release tablets. Are you able to switch the prescription to that so it can be filled until the other kind comes in? [facility]!  Patient is aware that communication with behavioral health has to be done via telephone, and when the pharmacy does not have supply in stock, as this has happened before, he is to contact other pharmacies to determine availability and then inform  the office.  The prescription was ordered 1/26/23 which was 7 days ago, patient will need to follow process per CSA.         1/25/23:   After Rescheduling patient today he wanted me to let Celina know that the Adderall XR 20 mg seems to be working well, but he did DC the Wellbutrin due to excessive restlessness.  I rescheduled patient to February 07/2023.      12/28/22:   -Continue Zoloft 50 mg by mouth daily to target anxiety.    -Continue Wellbutrin  mg po daily in the morning to target attention and concentration deficit.      Increase Adderall XR from 15 mg TO 20 mg by mouth daily in the morning to target symptoms associated with ADHD. Risks, benefits, side effects discussed with patient including elevated heart rate, elevated blood pressure, irritability, insomnia, sexual dysfunction, appetite suppressing properties, psychosis.  After discussion of these risks and benefits, the patient voiced understanding and agreed to proceed. Channing reviewed, last dispensed 15 mg 11/30/22 #30/30 days.  Informed patient order would be sent to JUAN Lang in practice, as  is out of office today, in separate entry for signature due to EMR system, and will not see as refilled on AVS today.  Adderall XR 15 mg providing inconsistent relief of ADHD symptoms, denies side effects, will increase dose today and have patient return in 3 weeks, as patient will be returning to work before 4 weeks from today.  Patient to track daily symptoms, activities, ability to complete tasks and report at next appointment.  Patient instructed to notify provider if experiencing insomnia as combination with Wellbutrin XL may effect sleep and dose may need to be lowered.  Patient to contact provider if symptoms worsen or fail to improve.       11/30/22:  Continue Zoloft 50 mg by mouth daily to target anxiety.      Continue Wellbutrin  mg po daily in the morning to target attention and concentration deficit.  Instructed patient to  request refill in 2-3 weeks from pharmacy if continues to tolerate with Adderall XR, otherwise to call office and request dose decrease to 150 mg.   Increase Adderall XR from 10 mg TO 15 mg by mouth daily in the morning to target symptoms associated with ADHD. Risks, benefits, side effects discussed with patient including elevated heart rate, elevated blood pressure, irritability, insomnia, sexual dysfunction, appetite suppressing properties, psychosis.  After discussion of these risks and benefits, the patient voiced understanding and agreed to proceed. Channing reviewed, last dispensed 10 mg dose 11/3/22 #30/30 days.  Informed patient order would be sent to Dr. Singh in separate entry for signature due to EMR system, and will not see as refilled on AVS today.  Controlled substance documentation: Channing reviewed; prior urine drug screen consistent obtained 11/3/22; consent is up to date, signed, witnessed and in EHR, dated 11/3/22, which will be updated annually per policy. Patient is aware of risk of addiction on this medication, understands need to follow up for a review every 3 months and medications will be adjusted or decreased as deemed appropriate at each visit.  No history of drug or alcohol abuse.  No concerns about diversion or abuse. Patient denies side effects related to the medication.  Patient is aware of random urine drug screens and pill counts. The dosing of this medication will be reviewed on a regular basis and reduced if possible..  Ongoing use of a controlled substance is necessary for this patient to have a normal quality of life.    Anxiety symptoms are under good control with Zoloft and Wellbutrin XL.  ADHD symptoms remain present, minimal effectiveness with 10 mg dose of Adderall XR, tolerating well without side effects, will increase dose today and send to [facility] pharmacy, patient instructed to contact either [place] office and leave vm with MA and if not heard back within 1-2 hrs to call  the [place] office, or patient may call the [facility] office initially as MA is not physically in the [place] office today though checks vm.  Patient verbalized understanding. Patient to contact provider if symptoms worsen or fail to improve.     11/3/22:   Patient called to inform [facility], primary pharmacy, does not have Adderall XR 10 mg in stock, patient was instructed to call other local pharmacies to check availability.   Patient called back to tell us that he checked around and [facility] has the Adderall.  Please send there Pharmacy changed and med pended in chart    11/3/22:  Continue Zoloft 50 mg by mouth daily to target anxiety.      Continue Wellbutrin  mg po daily in the morning to target attention and concentration deficit.      Start Adderall XR 10 mg by mouth daily in the morning to target symptoms associated with ADHD. Risks, benefits, side effects discussed with patient including elevated heart rate, elevated blood pressure, irritability, insomnia, sexual dysfunction, appetite suppressing properties, psychosis.  After discussion of these risks and benefits, the patient voiced understanding and agreed to proceed. Channing reviewed, UDS ordered, and controlled substance agreement signed & witnessed. Informed patient prescription would not be ordered until UDS results noted as negative, expected results within 30 minutes of providing sample.   POC UDS today in clinic  ADHD testing results reviewed from Dr. Eladio Dunn,[facility] dated 9/29/2022 indicating a diagnosis of RAFAEL and ADHD.     Patient to request refills for Wellbutrin XL and Zoloft in approximately 2 weeks, as patient has been educated he may need to decrease Wellbutrin XL dose due to addition of Adderall XR.  Patient to contact provider if symptoms worsen or fail to improve.     10/3/22:   -Continue Zoloft 50 mg by mouth daily to target anxiety.    -Continue Wellbutrin  mg po daily in the morning to target  attention and concentration deficit.     Patient denies need for refills at this time, tolerating current medications well without reported side effects.  Patient reported completing ADHD testing last week with Dr. Eladio Dunn, report not expected for at least 2 weeks. Will plan on next appointment in 4 weeks in person due to possible stimulant medication needed, as patient will be starting an new job Nov 6 and would prefer to come in the week prior.  If ADHD report findings indicate no formal diagnosis of ADHD will contact patient prior to next appointment.  Patient to contact provider if symptoms worsen or fail to improve.       7/26/22:   Declined therapy  Continue Zoloft 50 mg by mouth daily to target anxiety.  Risks, benefits, alternatives discussed with patient including GI upset, nausea vomiting diarrhea, theoretical decrease of seizure threshold predisposing the patient to a slightly higher seizure risk, headaches, sexual dysfunction, serotonin syndrome, bleeding risk, increased suicidality in patients 24 years and younger.  After discussion of these risks and benefits, the patient voiced understanding and agreed to proceed.  Increase Wellbutrin XL from 150 mg to 300 mg (instructed to start taking 2 tabs of the 150 mg on hand until new prescription picked up) po daily in the morning to target attention and concentration deficit. Discussed all risks, benefits, alternatives, and side effects of Bupropion/Wellbutrin including but not limited to GI upset (N/V/D, constipation), tachycardia, diaphoresis, weight loss, agitation, dizziness, headache, insomnia, tremor, blurred vision, anorexia, HTN, activation of char or hypomania, CNS stimulation and neuropsychiatric effects, ocular effects, seizure risk, withdrawal syndrome following abrupt discontinuation, and activation of suicidal ideation and behavior. Patient  educated on the need to practice safe sex while taking this med. Discussed the need for patient to  immediately call the office for any new or worsening symptoms, such as worsening depression; feeling nervous or restless; suicidal thoughts or actions; or other changes in mood or behavior, and all other concerns. Patient educated on medication compliance. Patient  verbalized understanding and is agreeable to taking Bupropion/Wellbutrin. Addressed all questions and concerns.     Referral for ADHD testing.  Patient to contact provider and set up appointment.  And to contact office if unable to get an appointment scheduled. -Attached list of several other sites for ADHD testing. Patient instructed to contact providers on list to determine availability of appointments, instructed patient to take notes while calling places indicating first available appointment, and to ask to be placed on waiting list.  If an office is chosen and requests the referral order please contact my Medical Assistant, Serene, directly at [phone] informing of chosen office and the information will be sent.    Dr. Eladio Dunn, Psychologist, MS, LPP  [address], [address]  [place] [id number] [phone]   Currently only accepting referrals for psychological testing services.  Accepts Most insurance plans except Medicare Plans    Patient presentation seems most consistent with anxiety vs ADHD, due to high suspicion of ADHD will refer for formal testing.  Increased Wellbutrin XL today , will see patient back in 4 weeks.       Patient screened positive for depression based on a PHQ-9 score of 0 on 11/5/2024. Follow-up recommendations include: Prescribed antidepressant medication treatment and Suicide Risk Assessment performed.     TREATMENT PLAN/GOALS: Continue supportive psychotherapy efforts and medications as indicated. Treatment and medication options discussed during today's visit. Patient ackowledged and verbally consented to continue with current treatment plan and was educated on the importance of compliance with  treatment and follow-up appointments.    MEDICATION ISSUES:  ANUSHA reviewed as expected.  Discussed medication options and treatment plan of prescribed medication as well as the risks, benefits, and side effects including potential falls, possible impaired driving and metabolic adversities among others. Patient is agreeable to call the office with any worsening of symptoms or onset of side effects. Patient is agreeable to call 911 or go to the nearest ER should he/she begin having SI/HI. No medication side effects or related complaints today.     MEDS ORDERED DURING VISIT:  No orders of the defined types were placed in this encounter.      Return in about 2 months (around 1/5/2025) for Video visit, medication check.         I spent 40 minutes caring for Thomas on this date of service. This time includes time spent by me in the following activities: preparing for the visit, reviewing tests, performing a medically appropriate examination and/or evaluation, counseling and educating the patient/family/caregiver, ordering medications, tests, or procedures, referring and communicating with other health care professionals, documenting information in the medical record, care coordination, and scheduling .      This document has been electronically signed by JUAN Hayes  November 5, 2024 09:22 EST      Part of this note may be an electronic transcription/translation of spoken language to printed text using the Dragon Dictation System.

## 2024-11-05 NOTE — PATIENT INSTRUCTIONS
"     SPECIFIC RECOMMENDATIONS:     1.      Medications discussed at this encounter:                   - Request refills when needed will plan for dose increase of Vyvanse from 40 to 50 mg at that time.    2.      Psychotherapy recommendations: Declined     3.     Return to clinic: 2 months 1/2/25 at 8am via video    4.  Urine drug screen today in clinic  and main lab for CBC, CMP and TSH thyroid panel    Please arrive at least 15 minutes before your scheduled appointment time to complete check in process.      IF you are scheduled for a [facility] VIDEO visit, YOU MUST COMPLETE THE \"E-CHECK IN\" PROCESS PRIOR TO BEGINNING THE VISIT, You may still complete the E-Check in for in office visits prior to appointment, you will receive multiple text/email reminders which will direct you further if needed.           "

## 2024-11-26 DIAGNOSIS — F90.0 ADHD (ATTENTION DEFICIT HYPERACTIVITY DISORDER), INATTENTIVE TYPE: ICD-10-CM

## 2024-11-26 RX ORDER — LISDEXAMFETAMINE DIMESYLATE 40 MG/1
40 CAPSULE ORAL EVERY MORNING
Qty: 30 CAPSULE | Refills: 0 | Status: SHIPPED | OUTPATIENT
Start: 2024-11-26

## 2024-11-26 NOTE — TELEPHONE ENCOUNTER
CONTROLLED MEDICATION REFILL REQUEST    STATE REGULATION APPT EVERY 3 MONTHS     UDS(URINE DRUG SCREEN) EVERY 6 MONTHS OR UP TO PROVIDER PREFERENCE   (HOOD ANDERSEN & FIGUEROA 1 PER YEAR)     NEW NARC CONSENT EVERY YEAR      MEDICATION: lisdexamfetamine (Vyvanse) 40 MG capsule (10/23/2024)      NEXT OFFICE VISIT: Appointment with Celina Barclay APRN (01/02/2025)     LAST OFFICE VISIT: Office Visit with Celina Barclay APRN (11/05/2024)     NARC CONSENT: BEHAVIORAL HEALTH - SCAN - CONTROLLED SUBSTANCE AGREEMENT [facility],11/05/2024 (11/05/2024)     URINE DRUG SCREEN(STANDING ORDER)   (HOOD ANDERSEN & FIGUEROA 1 PER YEAR): POC Medline 12 Panel Urine Drug Screen (11/05/2024 09:13)     PROVIDER PLEASE ADVISE

## 2024-12-11 DIAGNOSIS — F41.1 GENERALIZED ANXIETY DISORDER: ICD-10-CM

## 2024-12-12 NOTE — TELEPHONE ENCOUNTER
REFILL REQUEST:     sertraline (ZOLOFT) 50 MG tablet (10/22/2024)    -PT SHOULD HAVE REFILL AT THE PHARMACY.     F/UP- 01/02/2025.  LOV: 11/05/2024.

## 2025-01-02 ENCOUNTER — TELEMEDICINE (OUTPATIENT)
Dept: BEHAVIORAL HEALTH | Facility: CLINIC | Age: 35
End: 2025-01-02
Payer: COMMERCIAL

## 2025-01-02 DIAGNOSIS — Z79.899 MEDICATION MANAGEMENT: ICD-10-CM

## 2025-01-02 DIAGNOSIS — Z71.89 MEDICATION CARE PLAN DISCUSSED WITH PATIENT: ICD-10-CM

## 2025-01-02 DIAGNOSIS — F90.0 ADHD (ATTENTION DEFICIT HYPERACTIVITY DISORDER), INATTENTIVE TYPE: ICD-10-CM

## 2025-01-02 DIAGNOSIS — F90.0 ADHD (ATTENTION DEFICIT HYPERACTIVITY DISORDER), INATTENTIVE TYPE: Primary | ICD-10-CM

## 2025-01-02 DIAGNOSIS — F41.1 GENERALIZED ANXIETY DISORDER: ICD-10-CM

## 2025-01-02 RX ORDER — LISDEXAMFETAMINE DIMESYLATE 50 MG/1
50 CAPSULE ORAL EVERY MORNING
Qty: 30 CAPSULE | Refills: 0 | Status: SHIPPED | OUTPATIENT
Start: 2025-01-02

## 2025-01-02 NOTE — PROGRESS NOTES
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 easy distractability, inability to complete tasks, difficulty sustaining attention, shifting from one uncompleted activity to another, talking excessively, ineffective listening, frequently losing items, and impulsivity.  Patient reports frequently allow services to home: bug company spraying every 3 months, bought a 65,000 truck while in [place], purchased a 1,200 suit one day which was not thought out, and rarely wears.       PHQ-9 Depression Screening  PHQ-9 Total Score:   11/5/2024 0  PHQ-9 Depression Screening  Little interest or pleasure in doing things?     Feeling down, depressed, or hopeless?     PHQ-2 Total Score     Trouble falling or staying asleep, or sleeping too much?     Feeling tired or having little energy?     Poor appetite or overeating?     Feeling bad about yourself - or that you are a failure or have let yourself or your family down?     Trouble concentrating on things, such as reading the newspaper or watching television?     Moving or speaking so slowly that other people could have noticed? Or the opposite - being so fidgety or restless that you have been moving around a lot more than usual?     Thoughts that you would be better off dead, or of hurting yourself in some way?     PHQ-9 Total Score     If you checked off any problems, how difficult have these problems made it for you to do your work, take care of things at home, or get along with other people?      "   RAFAEL-7    11/5/2024 0    The following portions of the patient's history were reviewed and updated as appropriate: allergies, current medications, past family history, past medical history, past social history, and past surgical history.     Past Surgical History:  Past Surgical History:   Procedure Laterality Date    ENDOSCOPY N/A 4/14/2022    Procedure: ESOPHAGOGASTRODUODENOSCOPY WITH BX;  Surgeon: Joan Luna MD;  Location: [facility] ENDOSCOPY;  Service: Gastroenterology;  Laterality: N/A;  ESOPHAGITIS, GASTRITIS, ESOPHAGEAL STRICTURE    ENDOSCOPY N/A 5/23/2022    Procedure: ESOPHAGOGASTRODUODENOSCOPY WITH 10-15MM BALLOON DILATATION AND BIOPSIES;  Surgeon: Joan Luna MD;  Location: [facility] ENDOSCOPY;  Service: Gastroenterology;  Laterality: N/A;  GASTRITIS, ESOPHAGITIS, ESOPHAGEAL STRICTURE    HERNIA REPAIR      Hernia Repair: at age 17; right inguinal; uncomplicated; dual;    SHOULDER SURGERY      left shoulder labrum repair at age 22 - sports related injury       Problem List:  Patient Active Problem List   Diagnosis    Esophageal dysphagia    Anxiety    Attention and concentration deficit       Allergy:   No Known Allergies     Discontinued Medications:  There are no discontinued medications.        Current Medications:   Current Outpatient Medications   Medication Sig Dispense Refill    lisdexamfetamine (Vyvanse) 40 MG capsule Take 1 capsule by mouth Every Morning Indications: Attention Deficit Hyperactivity Disorder 30 capsule 0    sertraline (ZOLOFT) 50 MG tablet Take 1 tablet by mouth Every Morning 90 tablet 3    TiZANidine (ZANAFLEX) 4 MG capsule Take 1 capsule by mouth Every 6 to 8 hours as needed for muscle spasticity (Patient not taking: Reported on 11/5/2024) 20 capsule 0     No current facility-administered medications for this visit.       Past Medical History:  Past Medical History:   Diagnosis Date    Anxiety disorder, unspecified     Concussion     X3    Dysphagia,  unspecified     Fever, unspecified     Head injury     Influenza due to identified novel influenza A virus with other respiratory manifestations     Panic disorder     PONV (postoperative nausea and vomiting)     Procreative counseling and advice using natural family planning     PTSD (post-traumatic stress disorder)        Past Psychiatric History:  Began Treatment: 2021  Diagnoses:Anxiety  Psychiatrist: Denies  Therapist:Denies  Admission History:Denies  Medication Trials: Wellbutrin XL up to 300 mg self stopped 1/2023 due to excessive restlessness with combination of Adderall XR 20 mg  Adderall XR 20 mg eff.due to nationwide shortage stopped 1/2023; Azstarys lowest dose 1 month, due to cost stopping,somewhat eff.  Self Harm: Denies  Suicide Attempts:Denies    Substance Abuse History:   Types:Denies all, including illicit  Withdrawal Symptoms:Denies  Longest Period Sober:Not Applicable   AA: Not applicable     Social History: As of 11/5/24  Martial Status:  Employed:Yes and If so, where [employer] and . 9a-5p  Kids:Yes or If so, how many 2 girls     House:Lives in a house   History:  [id number]  Access to Guns: Yes, put in storage not in home    Social History     Socioeconomic History    Marital status:     Number of children: 2    Years of education: 16    Highest education level: Bachelor's degree (e.g., BA, AB, BS)   Tobacco Use    Smoking status: Never    Smokeless tobacco: Former     Types: Chew     Quit date: 5/21/2022   Vaping Use    Vaping status: Never Used   Substance and Sexual Activity    Alcohol use: Yes     Comment: 1-2 drinks per month     Drug use: Not Currently     Types: Marijuana     Comment: back in high school    Sexual activity: Yes       Family History:   Suicide Attempts:  Brother  Suicide Completions:Denies      Family History   Problem Relation Age of Onset    Sjogren's syndrome Mother     Diverticulitis Mother      Cardiomyopathy Father     Suicide Attempts Brother     Seizures Brother     ADD / ADHD Brother     ADD / ADHD Brother     Cardiomyopathy Paternal Uncle     Dementia Maternal Grandmother        Developmental History:   Born: [place]  Siblings:5   Childhood: Denies Abuse  High School:Completed  College: Bachelors in [employer]  kinesiology     Mental Status Exam:   Hygiene:   good  Cooperation:  Cooperative  Eye Contact:  Good  Psychomotor Behavior:  Appropriate  Affect:  Appropriate  Mood: euthymic   Speech:  Normal  Thought Process:  Goal directed  Thought Content:  Mood congruent  Suicidal:  None  Homicidal:  None  Hallucinations:  None  Delusion:  None  Memory:  Intact  Orientation:  Person, Place, Time and Situation  Reliability:  good  Insight:  Good  Judgement:  Good  Impulse Control:  Good  Physical/Medical Issues:  Yes Esophageal Dysphagia      Review of Systems:  Review of Systems   Constitutional:  Negative for diaphoresis and fatigue.   HENT:  Positive for hearing loss and tinnitus. Negative for drooling.         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   Respiratory:  Negative for cough and shortness of breath.    Cardiovascular:  Negative for chest pain, palpitations and leg swelling.   Gastrointestinal:  Negative for nausea and vomiting.   Endocrine: Negative for cold intolerance and heat intolerance.   Genitourinary:  Negative for difficulty urinating.   Musculoskeletal:  Negative for joint swelling.   Allergic/Immunologic: Negative for immunocompromised state.   Neurological:  Negative for dizziness, tremors, seizures, speech difficulty and numbness.   Psychiatric/Behavioral:  Negative for decreased concentration, hallucinations, self-injury, sleep disturbance and suicidal ideas. The patient is not nervous/anxious and is not hyperactive.          Physical Exam:  Physical Exam  Psychiatric:         Attention and Perception: Attention and perception normal.          Mood and Affect: Mood and affect normal.         Speech: Speech normal.         Behavior: Behavior normal. Behavior is cooperative.         Thought Content: Thought content normal. Thought content does not include suicidal ideation. Thought content does not include suicidal plan.         Cognition and Memory: Cognition and memory normal.         Judgment: Judgment normal.         Vital Signs:   There were no vitals taken for this visit.     Lab Results:    Lab on 11/05/2024   Component Date Value Ref Range Status    TSH 11/05/2024 0.473  0.270 - 4.200 uIU/mL Final    T Uptake 11/05/2024 1.01  0.80 - 1.30 TBI Final    T4, Total 11/05/2024 6.20  4.50 - 11.70 mcg/dL Final    T4 results may be falsely increased if patient taking Biotin.   Office Visit on 11/05/2024   Component Date Value Ref Range Status    Amphetamine Screen, Urine 11/05/2024 Positive (A)  Negative Final    Barbiturates Screen, Urine 11/05/2024 Negative  Negative Final    Buprenorphine, Screen, Urine 11/05/2024 Negative  Negative Final    Benzodiazepine Screen, Urine 11/05/2024 Negative  Negative Final    Cocaine Screen, Urine 11/05/2024 Negative  Negative Final    MDMA (ECSTASY) 11/05/2024 Negative  Negative Final    Methamphetamine, Ur 11/05/2024 Negative  Negative Final    Morphine/Opiates Screen, Urine 11/05/2024 Negative  Negative Final    Methadone Screen, Urine 11/05/2024 Negative  Negative Final    Oxycodone Screen, Urine 11/05/2024 Negative  Negative Final    Phencyclidine (PCP), Urine 11/05/2024 Negative  Negative Final    THC, Screen, Urine 11/05/2024 Negative  Negative Final    Lot Number 11/05/2024 [id number]   Final    Expiration Date 11/05/2024 05-15-26   Final    Glucose 11/05/2024 102 (H)  65 - 99 mg/dL Final    BUN 11/05/2024 11  6 - 20 mg/dL Final    Creatinine 11/05/2024 0.93  0.76 - 1.27 mg/dL Final    Sodium 11/05/2024 141  136 - 145 mmol/L Final    Potassium 11/05/2024 4.3  3.5 - 5.2 mmol/L Final    Chloride 11/05/2024 104   98 - 107 mmol/L Final    CO2 11/05/2024 25.9  22.0 - 29.0 mmol/L Final    Calcium 11/05/2024 9.3  8.6 - 10.5 mg/dL Final    Total Protein 11/05/2024 6.9  6.0 - 8.5 g/dL Final    Albumin 11/05/2024 4.4  3.5 - 5.2 g/dL Final    ALT (SGPT) 11/05/2024 15  1 - 41 U/L Final    AST (SGOT) 11/05/2024 24  1 - 40 U/L Final    Alkaline Phosphatase 11/05/2024 89  39 - 117 U/L Final    Total Bilirubin 11/05/2024 0.3  0.0 - 1.2 mg/dL Final    Globulin 11/05/2024 2.5  gm/dL Final    A/G Ratio 11/05/2024 1.8  g/dL Final    BUN/Creatinine Ratio 11/05/2024 11.8  7.0 - 25.0 Final    Anion Gap 11/05/2024 11.1  5.0 - 15.0 mmol/L Final    eGFR 11/05/2024 111.9  >60.0 mL/min/1.73 Final    WBC 11/05/2024 6.38  3.40 - 10.80 10*3/mm3 Final    RBC 11/05/2024 4.62  4.14 - 5.80 10*6/mm3 Final    Hemoglobin 11/05/2024 13.4  13.0 - 17.7 g/dL Final    Hematocrit 11/05/2024 40.2  37.5 - 51.0 % Final    MCV 11/05/2024 87.0  79.0 - 97.0 fL Final    MCH 11/05/2024 29.0  26.6 - 33.0 pg Final    MCHC 11/05/2024 33.3  31.5 - 35.7 g/dL Final    RDW 11/05/2024 12.6  12.3 - 15.4 % Final    RDW-SD 11/05/2024 40.7  37.0 - 54.0 fl Final    MPV 11/05/2024 8.9  6.0 - 12.0 fL Final    Platelets 11/05/2024 236  140 - 450 10*3/mm3 Final    Neutrophil % 11/05/2024 47.0  42.7 - 76.0 % Final    Lymphocyte % 11/05/2024 34.3  19.6 - 45.3 % Final    Monocyte % 11/05/2024 6.7  5.0 - 12.0 % Final    Eosinophil % 11/05/2024 10.7 (H)  0.3 - 6.2 % Final    Basophil % 11/05/2024 1.1  0.0 - 1.5 % Final    Immature Grans % 11/05/2024 0.2  0.0 - 0.5 % Final    Neutrophils, Absolute 11/05/2024 3.00  1.70 - 7.00 10*3/mm3 Final    Lymphocytes, Absolute 11/05/2024 2.19  0.70 - 3.10 10*3/mm3 Final    Monocytes, Absolute 11/05/2024 0.43  0.10 - 0.90 10*3/mm3 Final    Eosinophils, Absolute 11/05/2024 0.68 (H)  0.00 - 0.40 10*3/mm3 Final    Basophils, Absolute 11/05/2024 0.07  0.00 - 0.20 10*3/mm3 Final    Immature Grans, Absolute 11/05/2024 0.01  0.00 - 0.05 10*3/mm3 Final       EKG  Results:  No orders to display       Imaging Results:  No Images in the past 120 days found..      Assessment & Plan       Visit Diagnoses:    ICD-10-CM ICD-9-CM   1. ADHD (attention deficit hyperactivity disorder), inattentive type  F90.0 314.00   2. Generalized anxiety disorder  F41.1 300.02   3. Medication management  Z79.899 V58.69   4. Medication care plan discussed with patient  Z71.89 V65.49                 PLAN:  1.   Safety: No acute safety concerns  Therapy: None  Risk Assessment: Risk of self-harm acutely is moderate.  Risk factors include anxiety disorder,family history, access to guns/weapons, and recent psychosocial stressors (pandemic). Protective factors include no present SI, no history of suicide attempts or self-harm in the past, minimal AODA, healthcare seeking, future orientation, willingness to engage in care.  Risk of self-harm chronically is also moderate, but could be further elevated in the event of treatment noncompliance and/or AODA.  Meds:    -Continue Zoloft 50 mg by mouth daily to target anxiety and PTSD.      -INCREASE Vyvanse FROM 40 mg TO 50 mg by mouth daily in the morning without food to target symptoms associated with ADHD.    Instructed patient to take on an empty stomach to avoid delayed onset of action, and may eat 20-30 minutes after taking medication, though if unable to tolerate or wait to eat to expect the onset of action to be delayed.  Risks, benefits, and alternatives discussed with patient including insomnia, headache, irritability, overstimulation, tremor, dizziness, nausea, dry mouth, constipation, diarrhea, weight loss, sexual dysfunction, heart palpitations, elevated heart rate, elevated blood pressure, seizures and suicidal ideations though rare.  After discussion of these risks patient agreed to proceed with Vyvanse. Discussed all questions and concerns.  Patient instructed to contact provider immediately with any intolerable side effects, cardiac symptoms,  suicidal ideations, or worsening anxiety. Last dispensed 11/26/24 #30/30 days.  Last fill was for the 40 mg as provider was out of the office and the dose was not increased as previously discussed, which will increase with today's order. Informed patient order would be sent to Dr. Singh in separate entry for signature due to EMR system, and will not see as refilled on AVS today.     -Controlled substance documentation: [place] reviewed; prior urine drug screen consistent obtained 11/5/24; consent is up to date, signed, witnessed and in EHR, dated 11/5/24, which will be updated annually per policy. Patient is aware of risk of addiction on this medication, understands need to follow up for a review every 3 months and medications will be adjusted or decreased as deemed appropriate at each visit.  No history of drug or alcohol abuse.  No concerns about diversion or abuse. Patient denies side effects related to the medication.  Patient is aware of random urine drug screens and pill counts. The dosing of this medication will be reviewed on a regular basis and reduced if possible. Ongoing use of a controlled substance is necessary for this patient to have a normal quality of life.    Symptoms of anxiety and ADHD are under fair control with current medication regimen.    The plan was discussed with the patient. The patient was given time to ask questions and these questions were answered. At the conclusion of their visit they had no additional questions or concerns and all questions were answered to their satisfaction.   Patient was given instructions and counseling regarding condition and for health maintenance advice. Please see specific information pulled into the AVS if appropriate.    Patient to contact provider if symptoms worsen or fail to improve.      11/5/24:   -Continue Zoloft 50 mg by mouth daily to target anxiety and PTSD.      -Continue Vyvanse 40 mg by mouth daily in the morning without food to target symptoms  associated with ADHD.  WILL PLAN TO INCREASE DOSE TO 50 mg with next refill request.   Last dispensed 10/22/24 #30/30 days. Filled at [facility] due to [facility] pharmacy did not have supply available due to nationwide shortage.  -Controlled substance documentation: [place] reviewed; prior urine drug screen consistent obtained 11/3/23, obtained today 11/5/24; consent is up to date, signed, witnessed and in EHR, dated today 11/5/24, which will be updated annually per policy. Patient is aware of risk of addiction on this medication, understands need to follow up for a review every 3 months and medications will be adjusted or decreased as deemed appropriate at each visit.  No history of drug or alcohol abuse.  No concerns about diversion or abuse. Patient denies side effects related to the medication.  Patient is aware of random urine drug screens and pill counts. The dosing of this medication will be reviewed on a regular basis and reduced if possible. Ongoing use of a controlled substance is necessary for this patient to have a normal quality of life.  -Labs: POC UDS today in clinic, results reviewed as expected. CBC, CMP, and Thyroid Panel with TSH as patient has not had routine lab work in several years and no current follow up in EHR to see PCP. Patient has already eaten this morning and a lipid panel was not ordered.     Symptoms of anxiety, depression are under good control with current medication regimen.  ADHD symptoms are under fair control as patient is noticing current dose of Vyvanse is wearing off around 3pm, therefore will increase dose with next refill request and see patient back in 2 months.   The plan was discussed with the patient. The patient was given time to ask questions and these questions were answered. At the conclusion of their visit they had no additional questions or concerns and all questions were answered to their satisfaction.   Patient was given instructions and counseling regarding  condition and for health maintenance advice. Please see specific information pulled into the AVS if appropriate.    Patient to contact provider if symptoms worsen or fail to improve.      8/2/24:   -Continue Zoloft 50 mg by mouth daily to target anxiety and PTSD.    -Continue Vyvanse 40 mg by mouth daily in the morning without food to target symptoms associated with ADHD.  Last dispensed 6/25/24 #30/30 days. Pill count today #1. Informed patient order would be sent to Dr. Singh in separate entry for signature due to EMR system, and will not see as refilled on AVS today.   Symptoms of anxiety and ADHD are under good control with current medication regimen.  Next visit will be in the office for annual CSA.   The plan was discussed with the patient. The patient was given time to ask questions and these questions were answered. At the conclusion of their visit they had no additional questions or concerns and all questions were answered to their satisfaction.   Patient was given instructions and counseling regarding condition and for health maintenance advice. Please see specific information pulled into the AVS if appropriate.    Patient to contact provider if symptoms worsen or fail to improve.      5/2/24:   -Continue Zoloft 50 mg by mouth daily to target anxiety and PTSD.    -Continue Vyvanse 40 mg by mouth daily in the morning without food to target symptoms associated with ADHD.  Instructed patient to take on an empty stomach to avoid delayed onset of action, and may eat 20-30 minutes after taking medication, though if unable to tolerate or wait to eat to expect the onset of action to be delayed.   Last dispensed 4/5/24 #30/30 days. Patient to request refill when needed via [facility]. Instructed patient to write a note upon the refill request if he wishes to have rx sent to [facility] and indicate 90 days, as patient plans to contact mail order pharmacy to determine availability of quantity.     Symptoms of  anxiety, ADHD are under good control with current medication regimen.    Patient was given instructions and counseling regarding condition and for health maintenance advice. Please see specific information pulled into the AVS if appropriate.    Patient to contact provider if symptoms worsen or fail to improve.      2/2/24:   -Continue Zoloft 50 mg by mouth daily to target anxiety and PTSD.    -Continue Vyvanse 40 mg by mouth daily in the morning without food to target symptoms associated with ADHD. Last dispensed 1/26/24 #30/30 days. Patient to request refill when needed via [facility].     Symptoms of anxiety and ADHD are under good control with current medication regimen.  Updated pharmacy to [facility] per patient request which is mail order, secure email sent to patient email which was verified for patient to inform provider of whether or not the mail order pharmacy requires 90 days for the Vyvanse, and how soon the new prescription needs to be sent to ensure no missed doses and the turn around time, as patient was instructed to contact them and get back to provider. Will supply 90 days if pharmacy will fill as in the past the local pharmacy would not fill due to nationwide shortages.   Patient was given instructions and counseling regarding condition and for health maintenance advice. Please see specific information pulled into the AVS if appropriate.    Patient to contact provider if symptoms worsen or fail to improve.        11/3/23:  -Continue Zoloft 50 mg by mouth daily to target anxiety and PTSD.    -Continue Vyvanse 40 mg by mouth daily in the morning without food to target symptoms associated with ADHD.  Instructed patient to take on an empty stomach to avoid delayed onset of action, and may eat 20-30 minutes after taking medication, though if unable to tolerate or wait to eat to expect the onset of action to be delayed.  Risks, benefits, and alternatives discussed with patient including insomnia,  headache, irritability, overstimulation, tremor, dizziness, nausea, dry mouth, constipation, diarrhea, weight loss, sexual dysfunction, heart palpitations, elevated heart rate, elevated blood pressure, seizures and suicidal ideations though rare.  After discussion of these risks patient agreed to proceed with Vyvanse. Discussed all questions and concerns.  Patient instructed to contact provider immediately with any intolerable side effects, cardiac symptoms, suicidal ideations,  or worsening anxiety. Last dispensed 10/05/23 #30/30 days. Informed patient order would be sent to Dr. Singh in separate entry for signature due to EMR system, and will not see as refilled on AVS today.  Will send in for 90 day supply with first fill date of Monday 11/6/23, patient is now able to request refills via [facility].     Controlled substance documentation: [place] reviewed; prior urine drug screen consistent obtained 11/3/22, ordered for today; consent is up to date, signed, witnessed and in EHR, dated today 11/3/23, which will be updated annually per policy. Patient is aware of risk of addiction on this medication, understands need to follow up for a review every 3 months and medications will be adjusted or decreased as deemed appropriate at each visit.  No history of drug or alcohol abuse.  No concerns about diversion or abuse. Patient denies side effects related to the medication.  Patient is aware of random urine drug screens and pill counts. The dosing of this medication will be reviewed on a regular basis and reduced if possible. Ongoing use of a controlled substance is necessary for this patient to have a normal quality of life.  Labs: POC UDS today in clinic  Symptoms of ADHD are under good control with current medication regimen.    Patient was given instructions and counseling regarding condition and for health maintenance advice. Please see specific information pulled into the AVS if appropriate.    Patient to contact  provider if symptoms worsen or fail to improve.      8/16/23:   Continue Zoloft 50 mg by mouth daily to target anxiety.      Continue Vyvanse 40 mg by mouth daily in the morning without food to target symptoms associated with ADHD.  Instructed patient to take on an empty stomach to avoid delayed onset of action, and may eat 20-30 minutes after taking medication, though if unable to tolerate or wait to eat to expect the onset of action to be delayed.  Risks, benefits, and alternatives discussed with patient including insomnia, headache, irritability, overstimulation, tremor, dizziness, nausea, dry mouth, constipation, diarrhea, weight loss, sexual dysfunction, heart palpitations, elevated heart rate, elevated blood pressure, seizures and suicidal ideations though rare.  After discussion of these risks patient agreed to proceed with Vyvanse. Discussed all questions and concerns.  Patient instructed to contact provider immediately with any intolerable side effects, cardiac symptoms, suicidal ideations,  or worsening anxiety. Last dispensed 7/19/23 #30/30 days. Informed patient order would be sent to Dr. Singh in separate entry for signature due to EMR system, and will not see as refilled on AVS today.  With first fill date of 8/17/23.     Symptoms of ADHD are under good control with current medication regimen.  Next visit will be in the office for annual CSA and UDS, scheduled for Friday Nov. 3, 2023 at 8 am.   Patient was given instructions and counseling regarding condition and for health maintenance advice. Please see specific information pulled into the AVS if appropriate.    Patient to contact provider if symptoms worsen or fail to improve.         7/19/23:  Continue Zoloft 50 mg by mouth daily to target anxiety.  Refilled today for 90 days with 3 refills.    Continue Vyvanse 40 mg by mouth daily in the morning without food to target symptoms associated with ADHD. Last dispensed 6/15/23 #30/30 days.  Informed  patient order would be sent to Dr. Lyles in separate entry for signature due to EMR system, and will not see as refilled on AVS today, as Dr. Singh is out of the office.    Symptoms of anxiety and ADHD are under good control with current medication regimen.  Discussed option for a 90 day supply once long term dose determined, as patient started 40 mg of Vyvanse 23, will see back in 4 weeks to determine if further dose adjustments are needed.  Patient informed current CSA will  in Nov. 3 And will need to be seen in office by or before Nov. 3, 2023.   Patient was given instructions and counseling regarding condition and for health maintenance advice. Please see specific information pulled into the AVS if appropriate.    Patient to contact provider if symptoms worsen or fail to improve.         23:  -Continue Zoloft 50 mg by mouth daily to target anxiety.  Due to upcoming travel to [place] and [place] from 23-23, will send in a quantity of 4 to [facility] pharmacy today, will fill for 90 days at next appointment.   -Continue Vyvanse 40 mg by mouth daily in the morning without food to target symptoms associated with ADHD.  Instructed patient to take on an empty stomach to avoid delayed onset of action, and may eat 20-30 minutes after taking medication, though if unable to tolerate or wait to eat to expect the onset of action to be delayed.  Risks, benefits, and alternatives discussed with patient including insomnia, headache, irritability, overstimulation, tremor, dizziness, nausea, dry mouth, constipation, diarrhea, weight loss, sexual dysfunction, heart palpitations, elevated heart rate, elevated blood pressure, seizures and suicidal ideations though rare.  After discussion of these risks patient agreed to proceed with Vyvanse. Discussed all questions and concerns.  Patient instructed to contact provider immediately with any intolerable side effects, cardiac symptoms, suicidal  ideations,  or worsening anxiety. Last dispensed 6/15/23 #30/30 days. Due to upcoming travel to [place] and [place] from 6/30/23-7/18/23, will send in a quantity of 4 to [facility] pharmacy today with allowed early fill.  Informed patient order would be sent to Dr. Singh in separate entry for signature due to EMR system, and will not see as refilled on AVS today.     Controlled substance documentation: [place] reviewed; prior urine drug screen consistent obtained 11/3/22; consent is up to date, signed, witnessed and in EHR, dated 11/3/22, Vyvanse consent for treatment form dated 4/12/23, which will be updated annually per policy. Patient is aware of risk of addiction on this medication, understands need to follow up for a review every 3 months and medications will be adjusted or decreased as deemed appropriate at each visit.  No history of drug or alcohol abuse.  No concerns about diversion or abuse. Patient denies side effects related to the medication.  Patient is aware of random urine drug screens and pill counts. The dosing of this medication will be reviewed on a regular basis and reduced if possible. Ongoing use of a controlled substance is necessary for this patient to have a normal quality of life.  Symptoms of ADHD and anxiety are under good control with current medication regimen. Tolerating well without reported side effects.   Patient to contact provider if symptoms worsen or fail to improve.        5/12/23:  Symptoms of ADHD are under fair control with Vyvanse 30 mg, will increase dose today to 40 mg. Last dispensed 4/12/23 #30/30 days. Tolerating well without reported side effects.   Patient to contact provider if symptoms worsen or fail to improve.        4/12/23:   -Continue Zoloft 50 mg by mouth daily to target anxiety.    -Stop Azstarys (Serdexmethylphenidate and dexmethylphenidate) 26.1 mg-5.2 mg due to cost of medication.   -Start  Vyvanse 30 mg by mouth daily in the morning without  food to target symptoms associated with ADHD.  Instructed patient to take on an empty stomach to avoid delayed onset of action, and may eat 20-30 minutes after taking medication, though if unable to tolerate or wait to eat to expect the onset of action to be delayed.  Risks, benefits, and alternatives discussed with patient including insomnia, headache, irritability, overstimulation, tremor, dizziness, nausea, dry mouth, constipation, diarrhea, weight loss, sexual dysfunction, heart palpitations, elevated heart rate, elevated blood pressure, seizures and suicidal ideations though rare.  After discussion of these risks patient agreed to proceed with Vyvanse. Discussed all questions and concerns.  Patient instructed to contact provider immediately with any intolerable side effects, cardiac symptoms, suicidal ideations,  or worsening anxiety.     Informed patient order would be sent to Dr. Singh in separate entry for signature due to EMR system, and will not see as refilled on AVS today.  Instructed patient to come by [place] office today as he planned to sign medication portion controlled substance for Azstarys and Vyvanse.     Symptoms of ADHD are not under good control with Azstarys and due to high cost patient wishes to switch to Vyvanse.   Contacted [place] office staff and instructed staff to place another controlled substance consent to treat with Vyvanse and place in provider box for patient to sign both forms today.  Will send Vyvanse to [facility] as patient is going there today. Patient to contact provider if symptoms worsen or fail to improve.     3/15/23:  Continue Zoloft 50 mg by mouth daily to target anxiety.    -Discontinue Adderall XR 20 mg due to nationwide shortage patient has not been able to locate a pharmacy that has medication in stock, and has not had medication for 1 month.  last dispensed 12/28/22 #30/30 days.      Start Azstarys (Serdexmethylphenidate and dexmethylphenidate) 26.1  "mg-5.2 mg by mouth daily in the morning on an empty stomach (may eat in 20-30 minutes to help with absorption) to target symptoms associated with ADHD. Risks, benefits, side effects discussed with patient including elevated heart rate, elevated blood pressure, irritability, insomnia, sexual dysfunction, appetite suppressing properties, psychosis. After discussion of these risks and benefits, the patient voiced understanding and agreed to proceed. Informed patient order would be sent to Dr. Singh in separate entry for signature due to EMR system, and will not see as refilled on AVS today.  Instructed patient to come by [facility] within the next week to sign medication portion controlled substance for Azstarys.     Due to nationwide shortage of Adderall, discussed various options for ADHD treatment, patient willing to switch to alternate medication. Symptoms of ADHD remain present as patient has not had medication for at least 1 month.  Patient to contact provider if unable to  medication by tomorrow, given direct contact number for MA at [facility].  Patient to contact provider if symptoms worsen or fail to improve.       2/9/23:   Your request has been approved  Your PA request has been approved. Additional information will be provided in the approval communication. 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 -TC, LPN       ADDERALL XR 20MG            11:56 AM    Serene Salazar MA contacted Thomas Lei \"Renard\"    Serene Salazar MA         11:58 AM  Note    Called patient to let him know that we have received the approval for his Adderall XR 20mg    "     2/7/23:  Continue Zoloft 50 mg by mouth daily to target anxiety.      Continue Adderall XR 20 mg by mouth daily in the morning to target symptoms associated with ADHD. Risks, benefits, side effects discussed with patient including elevated heart rate, elevated blood pressure, irritability, insomnia, sexual dysfunction, appetite suppressing properties, psychosis.  After discussion of these risks and benefits, the patient voiced understanding and agreed to proceed. [place] reviewed, last dispensed 12/28/22 #30/30 days.  New order for 20 mg dose sent 1/26/23 to [facility] pharmacy due to [facility] pharmacy out of stock, however, awaiting PA from insurance at this time.     Patient noted with improvement of ADHD symptoms with dose increase of Adderall XR to 20 mg, however, PA is required which was reviewed with patient during visit.  Patient given detailed information to follow up with insurance company to determine specifics regarding which and how many medications have to be tried before restarting current dose of Adderall and contact office to MA directly later today.  Patient was not allowed option to pay out of pocket from pharmacy.  Will be in touch with patient later today.        2/6/23:  VICENTA LEI Key: [id number] - PA Case ID: [id number] - Rx #: [id number]  PA for Adderall XR 20mg sent to plan 02/06/2023 awaiting decision from insurance company      2/2/23: [employer] message from patient wife to PCP which was directed to this provider:   This message is being sent by Cindi Lei on behalf of Vicenta Lei.   Hi Dr. Haskins,     This is Cindi Lei on Vicenta Lei account. I am trying to  his medication, the generic Adderral and they are saying the extended release capsules are on backorder. The only thing the have in stock are non extended release tablets. Are you able to switch the prescription to that so it can be filled until the other kind comes in? [facility]. Thank  you!  Patient is aware that communication with behavioral health has to be done via telephone, and when the pharmacy does not have supply in stock, as this has happened before, he is to contact other pharmacies to determine availability and then inform the office.  The prescription was ordered 1/26/23 which was 7 days ago, patient will need to follow process per CSA.         1/25/23:   After Rescheduling patient today he wanted me to let Celina know that the Adderall XR 20 mg seems to be working well, but he did DC the Wellbutrin due to excessive restlessness.  I rescheduled patient to February 07/2023.      12/28/22:   -Continue Zoloft 50 mg by mouth daily to target anxiety.    -Continue Wellbutrin  mg po daily in the morning to target attention and concentration deficit.      Increase Adderall XR from 15 mg TO 20 mg by mouth daily in the morning to target symptoms associated with ADHD. Risks, benefits, side effects discussed with patient including elevated heart rate, elevated blood pressure, irritability, insomnia, sexual dysfunction, appetite suppressing properties, psychosis.  After discussion of these risks and benefits, the patient voiced understanding and agreed to proceed. [place] reviewed, last dispensed 15 mg 11/30/22 #30/30 days.  Informed patient order would be sent to JUAN Lang in practice, as  is out of office today, in separate entry for signature due to EMR system, and will not see as refilled on AVS today.  Adderall XR 15 mg providing inconsistent relief of ADHD symptoms, denies side effects, will increase dose today and have patient return in 3 weeks, as patient will be returning to work before 4 weeks from today.  Patient to track daily symptoms, activities, ability to complete tasks and report at next appointment.  Patient instructed to notify provider if experiencing insomnia as combination with Wellbutrin XL may effect sleep and dose may need to be lowered.  Patient to contact  provider if symptoms worsen or fail to improve.       11/30/22:  Continue Zoloft 50 mg by mouth daily to target anxiety.      Continue Wellbutrin  mg po daily in the morning to target attention and concentration deficit.  Instructed patient to request refill in 2-3 weeks from pharmacy if continues to tolerate with Adderall XR, otherwise to call office and request dose decrease to 150 mg.   Increase Adderall XR from 10 mg TO 15 mg by mouth daily in the morning to target symptoms associated with ADHD. Risks, benefits, side effects discussed with patient including elevated heart rate, elevated blood pressure, irritability, insomnia, sexual dysfunction, appetite suppressing properties, psychosis.  After discussion of these risks and benefits, the patient voiced understanding and agreed to proceed. Channing reviewed, last dispensed 10 mg dose 11/3/22 #30/30 days.  Informed patient order would be sent to Dr. Singh in separate entry for signature due to EMR system, and will not see as refilled on AVS today.  Controlled substance documentation: [place] reviewed; prior urine drug screen consistent obtained 11/3/22; consent is up to date, signed, witnessed and in EHR, dated 11/3/22, which will be updated annually per policy. Patient is aware of risk of addiction on this medication, understands need to follow up for a review every 3 months and medications will be adjusted or decreased as deemed appropriate at each visit.  No history of drug or alcohol abuse.  No concerns about diversion or abuse. Patient denies side effects related to the medication.  Patient is aware of random urine drug screens and pill counts. The dosing of this medication will be reviewed on a regular basis and reduced if possible..  Ongoing use of a controlled substance is necessary for this patient to have a normal quality of life.    Anxiety symptoms are under good control with Zoloft and Wellbutrin XL.  ADHD symptoms remain present, minimal  effectiveness with 10 mg dose of Adderall XR, tolerating well without side effects, [facility] increase dose today [facility], patient instructed to contact either [place] office and leave vm with MA and if not heard back within 1-2 hrs to call the [place] office, or patient may call the [facility] office initially as MA is not physically in the [place] office today though checks vm.  Patient verbalized understanding. Patient to contact provider if symptoms worsen or fail to improve.     11/3/22:   Patient called to inform [facility], primary pharmacy, does not have Adderall XR 10 mg in stock, patient was instructed to call other local pharmacies to check availability.   Patient called back to tell us that he [facility].  Please send there Pharmacy changed and med pended in chart    11/3/22:  Continue Zoloft 50 mg by mouth daily to target anxiety.      Continue Wellbutrin  mg po daily in the morning to target attention and concentration deficit.      Start Adderall XR 10 mg by mouth daily in the morning to target symptoms associated with ADHD. Risks, benefits, side effects discussed with patient including elevated heart rate, elevated blood pressure, irritability, insomnia, sexual dysfunction, appetite suppressing properties, psychosis.  After discussion of these risks and benefits, the patient voiced understanding and agreed to proceed. [place] reviewed, UDS ordered, and controlled substance agreement signed & witnessed. Informed patient prescription would not be ordered until UDS results noted as negative, expected results within 30 minutes of providing sample.   POC UDS today in clinic  ADHD testing results reviewed from Dr. Eladio Dunn,[facility] dated 9/29/2022 indicating a diagnosis of RAFAEL and ADHD.     Patient to request refills for Wellbutrin XL and Zoloft in approximately 2 weeks, as patient has been educated he may need to decrease  Wellbutrin XL dose due to addition of Adderall XR.  Patient to contact provider if symptoms worsen or fail to improve.     10/3/22:   -Continue Zoloft 50 mg by mouth daily to target anxiety.    -Continue Wellbutrin  mg po daily in the morning to target attention and concentration deficit.     Patient denies need for refills at this time, tolerating current medications well without reported side effects.  Patient reported completing ADHD testing last week with Dr. Eladio Dunn, report not expected for at least 2 weeks. Will plan on next appointment in 4 weeks in person due to possible stimulant medication needed, as patient will be starting an new job Nov 6 and would prefer to come in the week prior.  If ADHD report findings indicate no formal diagnosis of ADHD will contact patient prior to next appointment.  Patient to contact provider if symptoms worsen or fail to improve.       7/26/22:   Declined therapy  Continue Zoloft 50 mg by mouth daily to target anxiety.  Risks, benefits, alternatives discussed with patient including GI upset, nausea vomiting diarrhea, theoretical decrease of seizure threshold predisposing the patient to a slightly higher seizure risk, headaches, sexual dysfunction, serotonin syndrome, bleeding risk, increased suicidality in patients 24 years and younger.  After discussion of these risks and benefits, the patient voiced understanding and agreed to proceed.  Increase Wellbutrin XL from 150 mg to 300 mg (instructed to start taking 2 tabs of the 150 mg on hand until new prescription picked up) po daily in the morning to target attention and concentration deficit. Discussed all risks, benefits, alternatives, and side effects of Bupropion/Wellbutrin including but not limited to GI upset (N/V/D, constipation), tachycardia, diaphoresis, weight loss, agitation, dizziness, headache, insomnia, tremor, blurred vision, anorexia, HTN, activation of char or hypomania, CNS stimulation and  neuropsychiatric effects, ocular effects, seizure risk, withdrawal syndrome following abrupt discontinuation, and activation of suicidal ideation and behavior. Patient  educated on the need to practice safe sex while taking this med. Discussed the need for patient to immediately call the office for any new or worsening symptoms, such as worsening depression; feeling nervous or restless; suicidal thoughts or actions; or other changes in mood or behavior, and all other concerns. Patient educated on medication compliance. Patient  verbalized understanding and is agreeable to taking Bupropion/Wellbutrin. Addressed all questions and concerns.     Referral for ADHD testing.  Patient to contact provider and set up appointment.  And to contact office if unable to get an appointment scheduled. -Attached list of several other sites for ADHD testing. Patient instructed to contact providers on list to determine availability of appointments, instructed patient to take notes while calling places indicating first available appointment, and to ask to be placed on waiting list.  If an office is chosen and requests the referral order please contact my Medical Assistant, Serene, directly at [phone] informing of chosen office and the information will be sent.    Dr. Eladio Dunn, Psychologist, MS, LPP  [address], [address]  [place] [id number] [phone]   Currently only accepting referrals for psychological testing services.  Accepts Most insurance plans except Medicare Plans    Patient presentation seems most consistent with anxiety vs ADHD, due to high suspicion of ADHD will refer for formal testing.  Increased Wellbutrin XL today , will see patient back in 4 weeks.       Patient screened positive for depression based on a PHQ-9 score of 0 on 11/5/2024. Follow-up recommendations include: Prescribed antidepressant medication treatment and Suicide Risk Assessment performed.     TREATMENT PLAN/GOALS: Continue  supportive psychotherapy efforts and medications as indicated. Treatment and medication options discussed during today's visit. Patient ackowledged and verbally consented to continue with current treatment plan and was educated on the importance of compliance with treatment and follow-up appointments.    MEDICATION ISSUES:  ANUSHA reviewed as expected.  Discussed medication options and treatment plan of prescribed medication as well as the risks, benefits, and side effects including potential falls, possible impaired driving and metabolic adversities among others. Patient is agreeable to call the office with any worsening of symptoms or onset of side effects. Patient is agreeable to call 911 or go to the nearest ER should he/she begin having SI/HI. No medication side effects or related complaints today.     MEDS ORDERED DURING VISIT:  No orders of the defined types were placed in this encounter.      Return in about 4 weeks (around 1/30/2025) for Video visit, medication check.         I spent 20 minutes caring for Thomas on this date of service. This time includes time spent by me in the following activities: preparing for the visit, performing a medically appropriate examination and/or evaluation, counseling and educating the patient/family/caregiver, ordering medications, tests, or procedures, referring and communicating with other health care professionals, documenting information in the medical record, care coordination, and scheduling .  (Ordering of medications will be seen in a separate encounter due to EHR for Vyvanse)     This document has been electronically signed by JUAN Hayes  January 2, 2025 08:15 EST      Part of this note may be an electronic transcription/translation of spoken language to printed text using the Dragon Dictation System.

## 2025-01-02 NOTE — PATIENT INSTRUCTIONS
"SPECIFIC RECOMMENDATIONS:     1.      Medications discussed at this encounter:                   -  INCREASE Vyvanse from 40 mg TO 50 mg    2.      Psychotherapy recommendations:  Declined     3.     Return to clinic: 4 weeks Tues. 2/4/25 at 8am via video    Please arrive at least 15 minutes before your scheduled appointment time to complete check in process.      IF you are scheduled for a [employer] VIDEO visit, YOU MUST COMPLETE THE \"E-CHECK IN\" PROCESS PRIOR TO BEGINNING THE VISIT, You may still complete the E-Check in for in office visits prior to appointment, you will receive multiple text/email reminders which will direct you further if needed.            "

## 2025-01-10 DIAGNOSIS — F41.1 GENERALIZED ANXIETY DISORDER: ICD-10-CM

## 2025-01-10 NOTE — TELEPHONE ENCOUNTER
REFILL REQUEST:     sertraline (ZOLOFT) 50 MG tablet (10/22/2024)     F/UP- 02/04/2025.  LOV: 01/02/2025.

## 2025-02-04 ENCOUNTER — TELEMEDICINE (OUTPATIENT)
Dept: BEHAVIORAL HEALTH | Facility: CLINIC | Age: 35
End: 2025-02-04
Payer: COMMERCIAL

## 2025-02-04 DIAGNOSIS — Z79.899 MEDICATION MANAGEMENT: ICD-10-CM

## 2025-02-04 DIAGNOSIS — F90.0 ADHD (ATTENTION DEFICIT HYPERACTIVITY DISORDER), INATTENTIVE TYPE: ICD-10-CM

## 2025-02-04 DIAGNOSIS — F90.0 ADHD (ATTENTION DEFICIT HYPERACTIVITY DISORDER), INATTENTIVE TYPE: Primary | ICD-10-CM

## 2025-02-04 DIAGNOSIS — Z71.89 MEDICATION CARE PLAN DISCUSSED WITH PATIENT: ICD-10-CM

## 2025-02-04 DIAGNOSIS — F41.1 GENERALIZED ANXIETY DISORDER: ICD-10-CM

## 2025-02-04 RX ORDER — LISDEXAMFETAMINE DIMESYLATE 50 MG/1
50 CAPSULE ORAL EVERY MORNING
Qty: 30 CAPSULE | Refills: 0 | Status: SHIPPED | OUTPATIENT
Start: 2025-02-04

## 2025-02-04 NOTE — PROGRESS NOTES
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 when wife talks about grocery, \"I stay very engaged in work meetings because of the level of my position, I make a decision to close my laptop, to limit distraction.\"  Difficulty with Reading:No  Difficulty watching TV/Movies:Yes, \"If I don't find something exciting, I will go to next movie, series or video.  Even when I am with my parents, I will pull out phone to get more stimulation.\"     In home setting patient admits to wife having to ask several times if listening, due to not interested in topic, \"a lot of the time I hear her kind of, but I think I make a decision to not respond, it's odd.\" Patient has not worked the last 2 days and has made excuses to not change the cat box.  Has some times of using full day of yard work or house hold chores, \"it's either I am extremely productive or I avoid at all cost. It's a lot of excuses.\"  Patient feels avoidance with wife has been ongoing, no changes. Work has made anxiety worse and was started on medications in the last 1.5 yrs.  Patient has had pressure with interviews and promotions.  Patient reports avoiding laundry and would wear all clothing until none remaining, now wife does all the laundry.  Recalls only completing house hold tasks, such as the dishes 90% and wife has to finish the remaining 10%, \"I am the same with food, there's always 10% left on the plate.\"  Symptoms include fidgeting (knee bouncing or spins " phone on table or pen in hands), easy distractability, inability to complete tasks, difficulty sustaining attention, shifting from one uncompleted activity to another, talking excessively, ineffective listening, frequently losing items, and impulsivity.  Patient reports frequently allow services to home: bug company spraying every 3 months, bought a 65,000 truck while in [employer], purchased a 1,200 suit one day which was not thought out, and rarely wears.       PHQ-9 Depression Screening  PHQ-9 Total Score:   11/5/2024 0  PHQ-9 Depression Screening  Little interest or pleasure in doing things?     Feeling down, depressed, or hopeless?     PHQ-2 Total Score     Trouble falling or staying asleep, or sleeping too much?     Feeling tired or having little energy?     Poor appetite or overeating?     Feeling bad about yourself - or that you are a failure or have let yourself or your family down?     Trouble concentrating on things, such as reading the newspaper or watching television?     Moving or speaking so slowly that other people could have noticed? Or the opposite - being so fidgety or restless that you have been moving around a lot more than usual?     Thoughts that you would be better off dead, or of hurting yourself in some way?     PHQ-9 Total Score     If you checked off any problems, how difficult have these problems made it for you to do your work, take care of things at home, or get along with other people?        RAFAEL-7    11/5/2024 0    The following portions of the patient's history were reviewed and updated as appropriate: allergies, current medications, past family history, past medical history, past social history, and past surgical history.     Past Surgical History:  Past Surgical History:   Procedure Laterality Date    ENDOSCOPY N/A 4/14/2022    Procedure: ESOPHAGOGASTRODUODENOSCOPY WITH BX;  Surgeon: Joan Luna MD;  Location: [facility] ENDOSCOPY;  Service: Gastroenterology;  Laterality: N/A;   ESOPHAGITIS, GASTRITIS, ESOPHAGEAL STRICTURE    ENDOSCOPY N/A 5/23/2022    Procedure: ESOPHAGOGASTRODUODENOSCOPY WITH 10-15MM BALLOON DILATATION AND BIOPSIES;  Surgeon: Joan Luna MD;  Location: [facility] ENDOSCOPY;  Service: Gastroenterology;  Laterality: N/A;  GASTRITIS, ESOPHAGITIS, ESOPHAGEAL STRICTURE    HERNIA REPAIR      Hernia Repair: at age 17; right inguinal; uncomplicated; dual;    SHOULDER SURGERY      left shoulder labrum repair at age 22 - sports related injury       Problem List:  Patient Active Problem List   Diagnosis    Esophageal dysphagia    Anxiety    Attention and concentration deficit       Allergy:   No Known Allergies     Discontinued Medications:  Medications Discontinued During This Encounter   Medication Reason    sertraline (ZOLOFT) 50 MG tablet Reorder           Current Medications:   Current Outpatient Medications   Medication Sig Dispense Refill    sertraline (ZOLOFT) 50 MG tablet Take 1 tablet by mouth Every Morning 90 tablet 3    lisdexamfetamine (VYVANSE) 50 MG capsule Take 1 capsule by mouth Every Morning Indications: Attention Deficit Hyperactivity Disorder 30 capsule 0    TiZANidine (ZANAFLEX) 4 MG capsule Take 1 capsule by mouth Every 6 to 8 hours as needed for muscle spasticity (Patient not taking: Reported on 11/5/2024) 20 capsule 0     No current facility-administered medications for this visit.       Past Medical History:  Past Medical History:   Diagnosis Date    Anxiety disorder, unspecified     Concussion     X3    Dysphagia, unspecified     Fever, unspecified     Head injury     Influenza due to identified novel influenza A virus with other respiratory manifestations     Panic disorder     PONV (postoperative nausea and vomiting)     Procreative counseling and advice using natural family planning     PTSD (post-traumatic stress disorder)        Past Psychiatric History:  Began Treatment: 2021  Diagnoses:Anxiety  Psychiatrist:  Denies  Therapist:Denies  Admission History:Denies  Medication Trials: Wellbutrin XL up to 300 mg self stopped 1/2023 due to excessive restlessness with combination of Adderall XR 20 mg  Adderall XR 20 mg eff.due to nationwide shortage stopped 1/2023; Azstarys lowest dose 1 month, due to cost stopping,somewhat eff.  Self Harm: Denies  Suicide Attempts:Denies    Substance Abuse History:   Types:Denies all, including illicit  Withdrawal Symptoms:Denies  Longest Period Sober:Not Applicable   AA: Not applicable     Social History: As of 11/5/24  Martial Status:  Employed:Yes and If so, where [employer] and . 9a-5p  Kids:Yes or If so, how many 2 girls     House:Lives in a house   History:  [id number]  Access to Guns: Yes, put in storage not in home    Social History     Socioeconomic History    Marital status:     Number of children: 2    Years of education: 16    Highest education level: Bachelor's degree (e.g., BA, AB, BS)   Tobacco Use    Smoking status: Never    Smokeless tobacco: Former     Types: Chew     Quit date: 5/21/2022   Vaping Use    Vaping status: Never Used   Substance and Sexual Activity    Alcohol use: Yes     Comment: 1-2 drinks per month     Drug use: Not Currently     Types: Marijuana     Comment: back in high school    Sexual activity: Yes       Family History:   Suicide Attempts:  Brother  Suicide Completions:Denies      Family History   Problem Relation Age of Onset    Sjogren's syndrome Mother     Diverticulitis Mother     Cardiomyopathy Father     Suicide Attempts Brother     Seizures Brother     ADD / ADHD Brother     ADD / ADHD Brother     Cardiomyopathy Paternal Uncle     Dementia Maternal Grandmother        Developmental History:   Born: [place]  Siblings:5   Childhood: Denies Abuse  High School:Completed  College: Bachelors in K[employer]ology  kinesiology     Mental Status Exam:   Hygiene:   good  Cooperation:   Cooperative  Eye Contact:  Good  Psychomotor Behavior:  Appropriate  Affect:  Appropriate  Mood: euthymic   Speech:  Normal  Thought Process:  Goal directed  Thought Content:  Mood congruent  Suicidal:  None  Homicidal:  None  Hallucinations:  None  Delusion:  None  Memory:  Intact  Orientation:  Person, Place, Time and Situation  Reliability:  good  Insight:  Good  Judgement:  Good  Impulse Control:  Good  Physical/Medical Issues:  Yes Esophageal Dysphagia      Review of Systems:  Review of Systems   Constitutional:  Negative for diaphoresis and fatigue.   HENT:  Positive for hearing loss and tinnitus. Negative for drooling.         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   Respiratory:  Negative for cough and shortness of breath.    Cardiovascular:  Negative for chest pain, palpitations and leg swelling.   Gastrointestinal:  Negative for nausea and vomiting.   Endocrine: Negative for cold intolerance and heat intolerance.   Genitourinary:  Negative for difficulty urinating.   Musculoskeletal:  Negative for joint swelling.   Allergic/Immunologic: Negative for immunocompromised state.   Neurological:  Negative for dizziness, tremors, seizures, speech difficulty and numbness.   Psychiatric/Behavioral:  Negative for decreased concentration, hallucinations, self-injury, sleep disturbance and suicidal ideas. The patient is not nervous/anxious and is not hyperactive.          Physical Exam:  Physical Exam  Psychiatric:         Attention and Perception: Attention and perception normal.         Mood and Affect: Mood and affect normal.         Speech: Speech normal.         Behavior: Behavior normal. Behavior is cooperative.         Thought Content: Thought content normal. Thought content does not include suicidal ideation. Thought content does not include suicidal plan.         Cognition and Memory: Cognition and memory normal.         Judgment: Judgment normal.         Vital  Signs:   There were no vitals taken for this visit.     Lab Results:    Lab on 11/05/2024   Component Date Value Ref Range Status    TSH 11/05/2024 0.473  0.270 - 4.200 uIU/mL Final    T Uptake 11/05/2024 1.01  0.80 - 1.30 TBI Final    T4, Total 11/05/2024 6.20  4.50 - 11.70 mcg/dL Final    T4 results may be falsely increased if patient taking Biotin.   Office Visit on 11/05/2024   Component Date Value Ref Range Status    Amphetamine Screen, Urine 11/05/2024 Positive (A)  Negative Final    Barbiturates Screen, Urine 11/05/2024 Negative  Negative Final    Buprenorphine, Screen, Urine 11/05/2024 Negative  Negative Final    Benzodiazepine Screen, Urine 11/05/2024 Negative  Negative Final    Cocaine Screen, Urine 11/05/2024 Negative  Negative Final    MDMA (ECSTASY) 11/05/2024 Negative  Negative Final    Methamphetamine, Ur 11/05/2024 Negative  Negative Final    Morphine/Opiates Screen, Urine 11/05/2024 Negative  Negative Final    Methadone Screen, Urine 11/05/2024 Negative  Negative Final    Oxycodone Screen, Urine 11/05/2024 Negative  Negative Final    Phencyclidine (PCP), Urine 11/05/2024 Negative  Negative Final    THC, Screen, Urine 11/05/2024 Negative  Negative Final    Lot Number 11/05/2024 [id number]   Final    Expiration Date 11/05/2024 05-15-26   Final    Glucose 11/05/2024 102 (H)  65 - 99 mg/dL Final    BUN 11/05/2024 11  6 - 20 mg/dL Final    Creatinine 11/05/2024 0.93  0.76 - 1.27 mg/dL Final    Sodium 11/05/2024 141  136 - 145 mmol/L Final    Potassium 11/05/2024 4.3  3.5 - 5.2 mmol/L Final    Chloride 11/05/2024 104  98 - 107 mmol/L Final    CO2 11/05/2024 25.9  22.0 - 29.0 mmol/L Final    Calcium 11/05/2024 9.3  8.6 - 10.5 mg/dL Final    Total Protein 11/05/2024 6.9  6.0 - 8.5 g/dL Final    Albumin 11/05/2024 4.4  3.5 - 5.2 g/dL Final    ALT (SGPT) 11/05/2024 15  1 - 41 U/L Final    AST (SGOT) 11/05/2024 24  1 - 40 U/L Final    Alkaline Phosphatase 11/05/2024 89  39 - 117 U/L Final    Total Bilirubin  11/05/2024 0.3  0.0 - 1.2 mg/dL Final    Globulin 11/05/2024 2.5  gm/dL Final    A/G Ratio 11/05/2024 1.8  g/dL Final    BUN/Creatinine Ratio 11/05/2024 11.8  7.0 - 25.0 Final    Anion Gap 11/05/2024 11.1  5.0 - 15.0 mmol/L Final    eGFR 11/05/2024 111.9  >60.0 mL/min/1.73 Final    WBC 11/05/2024 6.38  3.40 - 10.80 10*3/mm3 Final    RBC 11/05/2024 4.62  4.14 - 5.80 10*6/mm3 Final    Hemoglobin 11/05/2024 13.4  13.0 - 17.7 g/dL Final    Hematocrit 11/05/2024 40.2  37.5 - 51.0 % Final    MCV 11/05/2024 87.0  79.0 - 97.0 fL Final    MCH 11/05/2024 29.0  26.6 - 33.0 pg Final    MCHC 11/05/2024 33.3  31.5 - 35.7 g/dL Final    RDW 11/05/2024 12.6  12.3 - 15.4 % Final    RDW-SD 11/05/2024 40.7  37.0 - 54.0 fl Final    MPV 11/05/2024 8.9  6.0 - 12.0 fL Final    Platelets 11/05/2024 236  140 - 450 10*3/mm3 Final    Neutrophil % 11/05/2024 47.0  42.7 - 76.0 % Final    Lymphocyte % 11/05/2024 34.3  19.6 - 45.3 % Final    Monocyte % 11/05/2024 6.7  5.0 - 12.0 % Final    Eosinophil % 11/05/2024 10.7 (H)  0.3 - 6.2 % Final    Basophil % 11/05/2024 1.1  0.0 - 1.5 % Final    Immature Grans % 11/05/2024 0.2  0.0 - 0.5 % Final    Neutrophils, Absolute 11/05/2024 3.00  1.70 - 7.00 10*3/mm3 Final    Lymphocytes, Absolute 11/05/2024 2.19  0.70 - 3.10 10*3/mm3 Final    Monocytes, Absolute 11/05/2024 0.43  0.10 - 0.90 10*3/mm3 Final    Eosinophils, Absolute 11/05/2024 0.68 (H)  0.00 - 0.40 10*3/mm3 Final    Basophils, Absolute 11/05/2024 0.07  0.00 - 0.20 10*3/mm3 Final    Immature Grans, Absolute 11/05/2024 0.01  0.00 - 0.05 10*3/mm3 Final       EKG Results:  No orders to display       Imaging Results:  No Images in the past 120 days found..      Assessment & Plan       Visit Diagnoses:    ICD-10-CM ICD-9-CM   1. ADHD (attention deficit hyperactivity disorder), inattentive type  F90.0 314.00   2. Generalized anxiety disorder  F41.1 300.02   3. Medication management  Z79.899 V58.69   4. Medication care plan discussed with patient  Z71.89  V65.49                   PLAN:  1.   Safety: No acute safety concerns  Therapy: None  Risk Assessment: Risk of self-harm acutely is moderate.  Risk factors include anxiety disorder,family history, access to guns/weapons, and recent psychosocial stressors (pandemic). Protective factors include no present SI, no history of suicide attempts or self-harm in the past, minimal AODA, healthcare seeking, future orientation, willingness to engage in care.  Risk of self-harm chronically is also moderate, but could be further elevated in the event of treatment noncompliance and/or AODA.  Meds:    -Continue Zoloft 50 mg by mouth daily to target anxiety and PTSD.  New prescription sent to [facility] for 90 days with 3 refills as patient has been informed insurance will no longer cover 30 days with local pharmacy for any medications.     -Continue Vyvanse 50 mg by mouth daily in the morning without food to target symptoms associated with ADHD.  Instructed patient to take on an empty stomach to avoid delayed onset of action, and may eat 20-30 minutes after taking medication, though if unable to tolerate or wait to eat to expect the onset of action to be delayed.  Risks, benefits, and alternatives discussed with patient including insomnia, headache, irritability, overstimulation, tremor, dizziness, nausea, dry mouth, constipation, diarrhea, weight loss, sexual dysfunction, heart palpitations, elevated heart rate, elevated blood pressure, seizures and suicidal ideations though rare.  After discussion of these risks patient agreed to proceed with Vyvanse. Discussed all questions and concerns.  Patient instructed to contact provider immediately with any intolerable side effects, cardiac symptoms, suicidal ideations, or worsening anxiety. Last dispensed 1/2/25 #30/30 days.  Informed patient order would be sent to Dr. Lyles, as Dr. Singh is out of the office today, in separate entry for signature due to EMR system, and will not see  as refilled on AVS today. Sending to [facility] for 30 days.     -Controlled substance documentation: [place] reviewed; prior urine drug screen consistent obtained 11/5/24; consent is up to date, signed, witnessed and in EHR, dated 11/5/24, which will be updated annually per policy. Patient is aware of risk of addiction on this medication, understands need to follow up for a review every 3 months and medications will be adjusted or decreased as deemed appropriate at each visit.  No history of drug or alcohol abuse.  No concerns about diversion or abuse. Patient denies side effects related to the medication.  Patient is aware of random urine drug screens and pill counts. The dosing of this medication will be reviewed on a regular basis and reduced if possible. Ongoing use of a controlled substance is necessary for this patient to have a normal quality of life.    Symptoms of anxiety and ADHD are under good control with current medication regimen. Explained to patient at length regarding pharmacy coverage regarding quantity at local vs mail order preferred pharmacy via employer, and patient wished to proceed with switching.  Patient has adequate supply of Vyvanse while awaiting transition as patient was advised to follow up with [facility] regarding use of miladis, when and how to track shipments, though usual set up return time is approximately 7 days. Patient to contact provider if there are any concerns or problems with switching to mail order.   The plan was discussed with the patient. The patient was given time to ask questions and these questions were answered. At the conclusion of their visit they had no additional questions or concerns and all questions were answered to their satisfaction.   Patient was given instructions and counseling regarding condition and for health maintenance advice. Please see specific information pulled into the AVS if appropriate.    Patient to contact provider if symptoms worsen or  fail to improve.        1/2/25:  -Continue Zoloft 50 mg by mouth daily to target anxiety and PTSD.    -INCREASE Vyvanse FROM 40 mg TO 50 mg by mouth daily in the morning without food to target symptoms associated with ADHD.  Last dispensed 11/26/24 #30/30 days.  Last fill was for the 40 mg as provider was out of the office and the dose was not increased as previously discussed, which will increase with today's order. Informed patient order would be sent to Dr. Singh in separate entry for signature due to EMR system, and will not see as refilled on AVS today.    Symptoms of anxiety and ADHD are under fair control with current medication regimen.    The plan was discussed with the patient. The patient was given time to ask questions and these questions were answered. At the conclusion of their visit they had no additional questions or concerns and all questions were answered to their satisfaction.   Patient was given instructions and counseling regarding condition and for health maintenance advice. Please see specific information pulled into the AVS if appropriate.    Patient to contact provider if symptoms worsen or fail to improve.      11/5/24:   -Continue Zoloft 50 mg by mouth daily to target anxiety and PTSD.      -Continue Vyvanse 40 mg by mouth daily in the morning without food to target symptoms associated with ADHD.  WILL PLAN TO INCREASE DOSE TO 50 mg with next refill request.   Last dispensed 10/22/24 #30/30 days. Filled at [facility] due to [facility] pharmacy did not have supply available due to nationwide shortage.  -Controlled substance documentation: [place] reviewed; prior urine drug screen consistent obtained 11/3/23, obtained today 11/5/24; consent is up to date, signed, witnessed and in EHR, dated today 11/5/24, which will be updated annually per policy. Patient is aware of risk of addiction on this medication, understands need to follow up for a review every 3 months and medications will be  adjusted or decreased as deemed appropriate at each visit.  No history of drug or alcohol abuse.  No concerns about diversion or abuse. Patient denies side effects related to the medication.  Patient is aware of random urine drug screens and pill counts. The dosing of this medication will be reviewed on a regular basis and reduced if possible. Ongoing use of a controlled substance is necessary for this patient to have a normal quality of life.  -Labs: POC UDS today in clinic, results reviewed as expected. CBC, CMP, and Thyroid Panel with TSH as patient has not had routine lab work in several years and no current follow up in EHR to see PCP. Patient has already eaten this morning and a lipid panel was not ordered.     Symptoms of anxiety, depression are under good control with current medication regimen.  ADHD symptoms are under fair control as patient is noticing current dose of Vyvanse is wearing off around 3pm, therefore will increase dose with next refill request and see patient back in 2 months.   The plan was discussed with the patient. The patient was given time to ask questions and these questions were answered. At the conclusion of their visit they had no additional questions or concerns and all questions were answered to their satisfaction.   Patient was given instructions and counseling regarding condition and for health maintenance advice. Please see specific information pulled into the AVS if appropriate.    Patient to contact provider if symptoms worsen or fail to improve.      8/2/24:   -Continue Zoloft 50 mg by mouth daily to target anxiety and PTSD.    -Continue Vyvanse 40 mg by mouth daily in the morning without food to target symptoms associated with ADHD.  Last dispensed 6/25/24 #30/30 days. Pill count today #1. Informed patient order would be sent to Dr. Singh in separate entry for signature due to EMR system, and will not see as refilled on AVS today.   Symptoms of anxiety and ADHD are under  good control with current medication regimen.  Next visit will be in the office for annual CSA.   The plan was discussed with the patient. The patient was given time to ask questions and these questions were answered. At the conclusion of their visit they had no additional questions or concerns and all questions were answered to their satisfaction.   Patient was given instructions and counseling regarding condition and for health maintenance advice. Please see specific information pulled into the AVS if appropriate.    Patient to contact provider if symptoms worsen or fail to improve.      5/2/24:   -Continue Zoloft 50 mg by mouth daily to target anxiety and PTSD.    -Continue Vyvanse 40 mg by mouth daily in the morning without food to target symptoms associated with ADHD.  Instructed patient to take on an empty stomach to avoid delayed onset of action, and may eat 20-30 minutes after taking medication, though if unable to tolerate or wait to eat to expect the onset of action to be delayed.   Last dispensed 4/5/24 #30/30 days. Patient to request refill when needed via [employer]. Instructed patient to write a note upon the refill request if he wishes to have rx sent to [facility] and indicate 90 days, as patient plans to contact mail order pharmacy to determine availability of quantity.     Symptoms of anxiety, ADHD are under good control with current medication regimen.    Patient was given instructions and counseling regarding condition and for health maintenance advice. Please see specific information pulled into the AVS if appropriate.    Patient to contact provider if symptoms worsen or fail to improve.      2/2/24:   -Continue Zoloft 50 mg by mouth daily to target anxiety and PTSD.    -Continue Vyvanse 40 mg by mouth daily in the morning without food to target symptoms associated with ADHD. Last dispensed 1/26/24 #30/30 days. Patient to request refill when needed via [employer].     Symptoms of anxiety and ADHD  are under good control with current medication regimen.  Updated pharmacy to [facility] per patient request which is mail order, secure email sent to patient email which was verified for patient to inform provider of whether or not the mail order pharmacy requires 90 days for the Vyvanse, and how soon the new prescription needs to be sent to ensure no missed doses and the turn around time, as patient was instructed to contact them and get back to provider. Will supply 90 days if pharmacy will fill as in the past the local pharmacy would not fill due to nationwide shortages.   Patient was given instructions and counseling regarding condition and for health maintenance advice. Please see specific information pulled into the AVS if appropriate.    Patient to contact provider if symptoms worsen or fail to improve.        11/3/23:  -Continue Zoloft 50 mg by mouth daily to target anxiety and PTSD.    -Continue Vyvanse 40 mg by mouth daily in the morning without food to target symptoms associated with ADHD.  Instructed patient to take on an empty stomach to avoid delayed onset of action, and may eat 20-30 minutes after taking medication, though if unable to tolerate or wait to eat to expect the onset of action to be delayed.  Risks, benefits, and alternatives discussed with patient including insomnia, headache, irritability, overstimulation, tremor, dizziness, nausea, dry mouth, constipation, diarrhea, weight loss, sexual dysfunction, heart palpitations, elevated heart rate, elevated blood pressure, seizures and suicidal ideations though rare.  After discussion of these risks patient agreed to proceed with Vyvanse. Discussed all questions and concerns.  Patient instructed to contact provider immediately with any intolerable side effects, cardiac symptoms, suicidal ideations,  or worsening anxiety. Last dispensed 10/05/23 #30/30 days. Informed patient order would be sent to Dr. Singh in separate entry for signature due to  EMR system, and will not see as refilled on AVS today.  Will send in for 90 day supply with first fill date of Monday 11/6/23, patient is now able to request refills via [employer].     Controlled substance documentation: [place]anning reviewed; prior urine drug screen consistent obtained 11/3/22, ordered for today; consent is up to date, signed, witnessed and in EHR, dated today 11/3/23, which will be updated annually per policy. Patient is aware of risk of addiction on this medication, understands need to follow up for a review every 3 months and medications will be adjusted or decreased as deemed appropriate at each visit.  No history of drug or alcohol abuse.  No concerns about diversion or abuse. Patient denies side effects related to the medication.  Patient is aware of random urine drug screens and pill counts. The dosing of this medication will be reviewed on a regular basis and reduced if possible. Ongoing use of a controlled substance is necessary for this patient to have a normal quality of life.  Labs: POC UDS today in clinic  Symptoms of ADHD are under good control with current medication regimen.    Patient was given instructions and counseling regarding condition and for health maintenance advice. Please see specific information pulled into the AVS if appropriate.    Patient to contact provider if symptoms worsen or fail to improve.      8/16/23:   Continue Zoloft 50 mg by mouth daily to target anxiety.      Continue Vyvanse 40 mg by mouth daily in the morning without food to target symptoms associated with ADHD.  Instructed patient to take on an empty stomach to avoid delayed onset of action, and may eat 20-30 minutes after taking medication, though if unable to tolerate or wait to eat to expect the onset of action to be delayed.  Risks, benefits, and alternatives discussed with patient including insomnia, headache, irritability, overstimulation, tremor, dizziness, nausea, dry mouth, constipation, diarrhea,  weight loss, sexual dysfunction, heart palpitations, elevated heart rate, elevated blood pressure, seizures and suicidal ideations though rare.  After discussion of these risks patient agreed to proceed with Vyvanse. Discussed all questions and concerns.  Patient instructed to contact provider immediately with any intolerable side effects, cardiac symptoms, suicidal ideations,  or worsening anxiety. Last dispensed 23 #30/30 days. Informed patient order would be sent to Dr. Singh in separate entry for signature due to EMR system, and will not see as refilled on AVS today.  With first fill date of 23.     Symptoms of ADHD are under good control with current medication regimen.  Next visit will be in the office for annual CSA and UDS, scheduled for Friday Nov. 3, 2023 at 8 am.   Patient was given instructions and counseling regarding condition and for health maintenance advice. Please see specific information pulled into the AVS if appropriate.    Patient to contact provider if symptoms worsen or fail to improve.         23:  Continue Zoloft 50 mg by mouth daily to target anxiety.  Refilled today for 90 days with 3 refills.    Continue Vyvanse 40 mg by mouth daily in the morning without food to target symptoms associated with ADHD. Last dispensed 6/15/23 #30/30 days.  Informed patient order would be sent to Dr. Lyles in separate entry for signature due to EMR system, and will not see as refilled on AVS today, as Dr. Singh is out of the office.    Symptoms of anxiety and ADHD are under good control with current medication regimen.  Discussed option for a 90 day supply once long term dose determined, as patient started 40 mg of Vyvanse 23, will see back in 4 weeks to determine if further dose adjustments are needed.  Patient informed current CSA will  in Nov. 3 And will need to be seen in office by or before Nov. 3, 2023.   Patient was given instructions and counseling regarding condition and  for health maintenance advice. Please see specific information pulled into the AVS if appropriate.    Patient to contact provider if symptoms worsen or fail to improve.         6/21/23:  -Continue Zoloft 50 mg by mouth daily to target anxiety.  Due to upcoming travel to [place] and [place] from 6/30/23-7/18/23, will send in a quantity of 4 to [facility] pharmacy today, will fill for 90 days at next appointment.   -Continue Vyvanse 40 mg by mouth daily in the morning without food to target symptoms associated with ADHD.  Instructed patient to take on an empty stomach to avoid delayed onset of action, and may eat 20-30 minutes after taking medication, though if unable to tolerate or wait to eat to expect the onset of action to be delayed.  Risks, benefits, and alternatives discussed with patient including insomnia, headache, irritability, overstimulation, tremor, dizziness, nausea, dry mouth, constipation, diarrhea, weight loss, sexual dysfunction, heart palpitations, elevated heart rate, elevated blood pressure, seizures and suicidal ideations though rare.  After discussion of these risks patient agreed to proceed with Vyvanse. Discussed all questions and concerns.  Patient instructed to contact provider immediately with any intolerable side effects, cardiac symptoms, suicidal ideations,  or worsening anxiety. Last dispensed 6/15/23 #30/30 days. Due to upcoming travel to [place] and [place] from 6/30/23-7/18/23, will send in a quantity of 4 to [facility] pharmacy today with allowed early fill.  Informed patient order would be sent to Dr. Singh in separate entry for signature due to EMR system, and will not see as refilled on AVS today.     Controlled substance documentation: [place] reviewed; prior urine drug screen consistent obtained 11/3/22; consent is up to date, signed, witnessed and in EHR, dated 11/3/22, Vyvanse consent for treatment form dated 4/12/23, which will be updated annually  per policy. Patient is aware of risk of addiction on this medication, understands need to follow up for a review every 3 months and medications will be adjusted or decreased as deemed appropriate at each visit.  No history of drug or alcohol abuse.  No concerns about diversion or abuse. Patient denies side effects related to the medication.  Patient is aware of random urine drug screens and pill counts. The dosing of this medication will be reviewed on a regular basis and reduced if possible. Ongoing use of a controlled substance is necessary for this patient to have a normal quality of life.  Symptoms of ADHD and anxiety are under good control with current medication regimen. Tolerating well without reported side effects.   Patient to contact provider if symptoms worsen or fail to improve.        5/12/23:  Symptoms of ADHD are under fair control with Vyvanse 30 mg, will increase dose today to 40 mg. Last dispensed 4/12/23 #30/30 days. Tolerating well without reported side effects.   Patient to contact provider if symptoms worsen or fail to improve.        4/12/23:   -Continue Zoloft 50 mg by mouth daily to target anxiety.    -Stop Azstarys (Serdexmethylphenidate and dexmethylphenidate) 26.1 mg-5.2 mg due to cost of medication.   -Start  Vyvanse 30 mg by mouth daily in the morning without food to target symptoms associated with ADHD.  Instructed patient to take on an empty stomach to avoid delayed onset of action, and may eat 20-30 minutes after taking medication, though if unable to tolerate or wait to eat to expect the onset of action to be delayed.  Risks, benefits, and alternatives discussed with patient including insomnia, headache, irritability, overstimulation, tremor, dizziness, nausea, dry mouth, constipation, diarrhea, weight loss, sexual dysfunction, heart palpitations, elevated heart rate, elevated blood pressure, seizures and suicidal ideations though rare.  After discussion of these risks patient  agreed to proceed with Vyvanse. Discussed all questions and concerns.  Patient instructed to contact provider immediately with any intolerable side effects, cardiac symptoms, suicidal ideations,  or worsening anxiety.     Informed patient order would be sent to Dr. Singh in separate entry for signature due to EMR system, and will not see as refilled on AVS today.  Instructed patient to come by [place] office today as he planned to sign medication portion controlled substance for Azstarys and Vyvanse.     Symptoms of ADHD are not under good control with Azstarys and due to high cost patient wishes to switch to Vyvanse.   Contacted [place] office staff and instructed staff to place another controlled substance consent to treat with Vyvanse and place in provider box for patient to sign both forms today.  Will send Vyvanse to [facility] as patient is going there today. Patient to contact provider if symptoms worsen or fail to improve.     3/15/23:  Continue Zoloft 50 mg by mouth daily to target anxiety.    -Discontinue Adderall XR 20 mg due to nationwide shortage patient has not been able to locate a pharmacy that has medication in stock, and has not had medication for 1 month.  last dispensed 12/28/22 #30/30 days.      Start Azstarys (Serdexmethylphenidate and dexmethylphenidate) 26.1 mg-5.2 mg by mouth daily in the morning on an empty stomach (may eat in 20-30 minutes to help with absorption) to target symptoms associated with ADHD. Risks, benefits, side effects discussed with patient including elevated heart rate, elevated blood pressure, irritability, insomnia, sexual dysfunction, appetite suppressing properties, psychosis. After discussion of these risks and benefits, the patient voiced understanding and agreed to proceed. Informed patient order would be sent to Dr. Singh in separate entry for signature due to EMR system, and will not see as refilled on AVS today.  Instructed patient to come by  "[place] office within the next week to sign medication portion controlled substance for Azstarys.     Due to nationwide shortage of Adderall, discussed various options for ADHD treatment, patient willing to switch to alternate medication. Symptoms of ADHD remain present as patient has not had medication for at least 1 month.  Patient to contact provider if unable to  medication by tomorrow, given direct contact number for MA at [facility].  Patient to contact provider if symptoms worsen or fail to improve.       2/9/23:   Your request has been approved  Your PA request has been approved. Additional information will be provided in the approval communication. 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 -TC, LPN       ADDERALL XR 20MG            11:56 AM    Serene Salazar MA contacted Thomas Lei \"Renard\"    Serene Salazar MA         11:58 AM  Note    Called patient to let him know that we have received the approval for his Adderall XR 20mg        2/7/23:  Continue Zoloft 50 mg by mouth daily to target anxiety.      Continue Adderall XR 20 mg by mouth daily in the morning to target symptoms associated with ADHD. Risks, benefits, side effects discussed with patient including elevated heart rate, elevated blood pressure, irritability, insomnia, sexual dysfunction, appetite suppressing properties, psychosis.  After discussion of these risks and benefits, the patient voiced understanding and agreed to proceed. Channing reviewed, last dispensed 12/28/22 #30/30 days.  New order for 20 mg dose sent 1/26/23 to [facility] pharmacy due to [facility] pharmacy out of " stock, however, awaiting PA from insurance at this time.     Patient noted with improvement of ADHD symptoms with dose increase of Adderall XR to 20 mg, however, PA is required which was reviewed with patient during visit.  Patient given detailed information to follow up with insurance company to determine specifics regarding which and how many medications have to be tried before restarting current dose of Adderall and contact office to MA directly later today.  Patient was not allowed option to pay out of pocket from pharmacy.  Will be in touch with patient later today.        2/6/23:  VICENTA LEI Key: [id number] - PA Case ID: [id number] - Rx #: [id number]  PA for Adderall XR 20mg sent to plan 02/06/2023 awaiting decision from insurance company      2/2/23: [employer] message from patient wife to PCP which was directed to this provider:   This message is being sent by Cindi Lei on behalf of Vicenta Lei.   Hi Dr. Haskins,     This is Cindi Lei on Vicenta Lei account. I am trying to  his medication, the generic Adderral and they are saying the extended release capsules are on backorder. The only thing the have in stock are non extended release tablets. Are you able to switch the prescription to that so it can be filled until the other kind comes in? [facility] now. Thank you!  Patient is aware that communication with behavioral health has to be done via telephone, and when the pharmacy does not have supply in stock, as this has happened before, he is to contact other pharmacies to determine availability and then inform the office.  The prescription was ordered 1/26/23 which was 7 days ago, patient will need to follow process per CSA.         1/25/23:   After Rescheduling patient today he wanted me to let Celina know that the Adderall XR 20 mg seems to be working well, but he did DC the Wellbutrin due to excessive restlessness.  I rescheduled patient to February 07/2023.      12/28/22:    -Continue Zoloft 50 mg by mouth daily to target anxiety.    -Continue Wellbutrin  mg po daily in the morning to target attention and concentration deficit.      Increase Adderall XR from 15 mg TO 20 mg by mouth daily in the morning to target symptoms associated with ADHD. Risks, benefits, side effects discussed with patient including elevated heart rate, elevated blood pressure, irritability, insomnia, sexual dysfunction, appetite suppressing properties, psychosis.  After discussion of these risks and benefits, the patient voiced understanding and agreed to proceed. Channing reviewed, last dispensed 15 mg 11/30/22 #30/30 days.  Informed patient order would be sent to JUAN Lang in practice, as  is out of office today, in separate entry for signature due to EMR system, and will not see as refilled on AVS today.  Adderall XR 15 mg providing inconsistent relief of ADHD symptoms, denies side effects, will increase dose today and have patient return in 3 weeks, as patient will be returning to work before 4 weeks from today.  Patient to track daily symptoms, activities, ability to complete tasks and report at next appointment.  Patient instructed to notify provider if experiencing insomnia as combination with Wellbutrin XL may effect sleep and dose may need to be lowered.  Patient to contact provider if symptoms worsen or fail to improve.       11/30/22:  Continue Zoloft 50 mg by mouth daily to target anxiety.      Continue Wellbutrin  mg po daily in the morning to target attention and concentration deficit.  Instructed patient to request refill in 2-3 weeks from pharmacy if continues to tolerate with Adderall XR, otherwise to call office and request dose decrease to 150 mg.   Increase Adderall XR from 10 mg TO 15 mg by mouth daily in the morning to target symptoms associated with ADHD. Risks, benefits, side effects discussed with patient including elevated heart rate, elevated blood pressure,  irritability, insomnia, sexual dysfunction, appetite suppressing properties, psychosis.  After discussion of these risks and benefits, the patient voiced understanding and agreed to proceed. Channing reviewed, last dispensed 10 mg dose 11/3/22 #30/30 days.  Informed patient order would be sent to Dr. Singh in separate entry for signature due to EMR system, and will not see as refilled on AVS today.  Controlled substance documentation: [place] reviewed; prior urine drug screen consistent obtained 11/3/22; consent is up to date, signed, witnessed and in EHR, dated 11/3/22, which will be updated annually per policy. Patient is aware of risk of addiction on this medication, understands need to follow up for a review every 3 months and medications will be adjusted or decreased as deemed appropriate at each visit.  No history of drug or alcohol abuse.  No concerns about diversion or abuse. Patient denies side effects related to the medication.  Patient is aware of random urine drug screens and pill counts. The dosing of this medication will be reviewed on a regular basis and reduced if possible..  Ongoing use of a controlled substance is necessary for this patient to have a normal quality of life.    Anxiety symptoms are under good control with Zoloft and Wellbutrin XL.  ADHD symptoms remain present, minimal effectiveness with 10 mg dose of Adderall XR, tolerating well without side effects, will increase dose today and send to [facility] pharmacy, patient instructed to contact either [place] office and leave vm with MA and if not heard back within 1-2 hrs to call the [place] office, or patient may call the [facility] office initially as MA is not physically in the [place] office today though checks vm.  Patient verbalized understanding. Patient to contact provider if symptoms worsen or fail to improve.     11/3/22:   Patient called to inform [facility], primary pharmacy, does not have Adderall XR 10 mg in stock,  patient was instructed to call other local pharmacies to check availability.   Patient called back to tell us that he checked around and [facility] has the Adderall.  Please send there Pharmacy changed and med pended in chart    11/3/22:  Continue Zoloft 50 mg by mouth daily to target anxiety.      Continue Wellbutrin  mg po daily in the morning to target attention and concentration deficit.      Start Adderall XR 10 mg by mouth daily in the morning to target symptoms associated with ADHD. Risks, benefits, side effects discussed with patient including elevated heart rate, elevated blood pressure, irritability, insomnia, sexual dysfunction, appetite suppressing properties, psychosis.  After discussion of these risks and benefits, the patient voiced understanding and agreed to proceed. [place] reviewed, UDS ordered, and controlled substance agreement signed & witnessed. Informed patient prescription would not be ordered until UDS results noted as negative, expected results within 30 minutes of providing sample.   POC UDS today in clinic  ADHD testing results reviewed from Dr. Eladio Dunn,[facility] dated 9/29/2022 indicating a diagnosis of RAFAEL and ADHD.     Patient to request refills for Wellbutrin XL and Zoloft in approximately 2 weeks, as patient has been educated he may need to decrease Wellbutrin XL dose due to addition of Adderall XR.  Patient to contact provider if symptoms worsen or fail to improve.     10/3/22:   -Continue Zoloft 50 mg by mouth daily to target anxiety.    -Continue Wellbutrin  mg po daily in the morning to target attention and concentration deficit.     Patient denies need for refills at this time, tolerating current medications well without reported side effects.  Patient reported completing ADHD testing last week with Dr. Eladio Dunn, report not expected for at least 2 weeks. Will plan on next appointment in 4 weeks in person due to possible stimulant medication needed, as  patient will be starting an new job Nov 6 and would prefer to come in the week prior.  If ADHD report findings indicate no formal diagnosis of ADHD will contact patient prior to next appointment.  Patient to contact provider if symptoms worsen or fail to improve.       7/26/22:   Declined therapy  Continue Zoloft 50 mg by mouth daily to target anxiety.  Risks, benefits, alternatives discussed with patient including GI upset, nausea vomiting diarrhea, theoretical decrease of seizure threshold predisposing the patient to a slightly higher seizure risk, headaches, sexual dysfunction, serotonin syndrome, bleeding risk, increased suicidality in patients 24 years and younger.  After discussion of these risks and benefits, the patient voiced understanding and agreed to proceed.  Increase Wellbutrin XL from 150 mg to 300 mg (instructed to start taking 2 tabs of the 150 mg on hand until new prescription picked up) po daily in the morning to target attention and concentration deficit. Discussed all risks, benefits, alternatives, and side effects of Bupropion/Wellbutrin including but not limited to GI upset (N/V/D, constipation), tachycardia, diaphoresis, weight loss, agitation, dizziness, headache, insomnia, tremor, blurred vision, anorexia, HTN, activation of char or hypomania, CNS stimulation and neuropsychiatric effects, ocular effects, seizure risk, withdrawal syndrome following abrupt discontinuation, and activation of suicidal ideation and behavior. Patient  educated on the need to practice safe sex while taking this med. Discussed the need for patient to immediately call the office for any new or worsening symptoms, such as worsening depression; feeling nervous or restless; suicidal thoughts or actions; or other changes in mood or behavior, and all other concerns. Patient educated on medication compliance. Patient  verbalized understanding and is agreeable to taking Bupropion/Wellbutrin. Addressed all questions and  concerns.     Referral for ADHD testing.  Patient to contact provider and set up appointment.  And to contact office if unable to get an appointment scheduled. -Attached list of several other sites for ADHD testing. Patient instructed to contact providers on list to determine availability of appointments, instructed patient to take notes while calling places indicating first available appointment, and to ask to be placed on waiting list.  If an office is chosen and requests the referral order please contact my Medical Assistant, Serene, directly at [phone] informing of chosen office and the information will be sent.    Dr. Eladio Dunn, Psychologist, MS, LPP  [address], [address]  [place] [id number] [phone]   Currently only accepting referrals for psychological testing services.  Accepts Most insurance plans except Medicare Plans    Patient presentation seems most consistent with anxiety vs ADHD, due to high suspicion of ADHD will refer for formal testing.  Increased Wellbutrin XL today , will see patient back in 4 weeks.       Patient screened positive for depression based on a PHQ-9 score of 0 on 11/5/2024. Follow-up recommendations include: Prescribed antidepressant medication treatment and Suicide Risk Assessment performed.     TREATMENT PLAN/GOALS: Continue supportive psychotherapy efforts and medications as indicated. Treatment and medication options discussed during today's visit. Patient ackowledged and verbally consented to continue with current treatment plan and was educated on the importance of compliance with treatment and follow-up appointments.    MEDICATION ISSUES:  ANUSHA reviewed as expected.  Discussed medication options and treatment plan of prescribed medication as well as the risks, benefits, and side effects including potential falls, possible impaired driving and metabolic adversities among others. Patient is agreeable to call the office with any worsening of  symptoms or onset of side effects. Patient is agreeable to call 911 or go to the nearest ER should he/she begin having SI/HI. No medication side effects or related complaints today.     MEDS ORDERED DURING VISIT:  New Medications Ordered This Visit   Medications    sertraline (ZOLOFT) 50 MG tablet     Sig: Take 1 tablet by mouth Every Morning     Dispense:  90 tablet     Refill:  3     SWITCHING TO [facility] MAIL ORDER       Return in about 3 months (around 5/4/2025) for Video visit, medication check.         I spent 26 minutes caring for Thomas on this date of service. This time includes time spent by me in the following activities: preparing for the visit, performing a medically appropriate examination and/or evaluation, counseling and educating the patient/family/caregiver, ordering medications, tests, or procedures, referring and communicating with other health care professionals, documenting information in the medical record, care coordination, and scheduling .      This document has been electronically signed by JUAN Hayes  February 4, 2025 10:46 EST      Part of this note may be an electronic transcription/translation of spoken language to printed text using the Dragon Dictation System.

## 2025-02-04 NOTE — PATIENT INSTRUCTIONS
"SPECIFIC RECOMMENDATIONS:     1.      Medications discussed at this encounter:                   -  Sent new prescription for Zoloft to [facility]  Will send 30 day prescription to [facility] which will be sent     2.      Psychotherapy recommendations: Declined     3.     Return to clinic: 3 months, Monday 5/4/25 0840 via video    Please arrive at least 15 minutes before your scheduled appointment time to complete check in process.      IF you are scheduled for a [employer] VIDEO visit, YOU MUST COMPLETE THE \"E-CHECK IN\" PROCESS PRIOR TO BEGINNING THE VISIT, You may still complete the E-Check in for in office visits prior to appointment, you will receive multiple text/email reminders which will direct you further if needed.            "

## 2025-03-28 DIAGNOSIS — F41.1 GENERALIZED ANXIETY DISORDER: ICD-10-CM

## 2025-03-28 DIAGNOSIS — F90.0 ADHD (ATTENTION DEFICIT HYPERACTIVITY DISORDER), INATTENTIVE TYPE: ICD-10-CM

## 2025-03-28 RX ORDER — LISDEXAMFETAMINE DIMESYLATE 50 MG/1
50 CAPSULE ORAL EVERY MORNING
Qty: 30 CAPSULE | Refills: 0 | Status: SHIPPED | OUTPATIENT
Start: 2025-03-28

## 2025-05-02 DIAGNOSIS — F90.0 ADHD (ATTENTION DEFICIT HYPERACTIVITY DISORDER), INATTENTIVE TYPE: ICD-10-CM

## 2025-05-02 RX ORDER — LISDEXAMFETAMINE DIMESYLATE 50 MG/1
50 CAPSULE ORAL EVERY MORNING
Qty: 30 CAPSULE | Refills: 0 | Status: SHIPPED | OUTPATIENT
Start: 2025-05-03

## 2025-05-02 NOTE — TELEPHONE ENCOUNTER
lisdexamfetamine (VYVANSE) 50 MG capsule       Last ordered: 1 month ago (3/28/2025) by Efrain Singh MD     FOLLOW UP 05/05/2025

## 2025-05-05 ENCOUNTER — TELEMEDICINE (OUTPATIENT)
Dept: BEHAVIORAL HEALTH | Facility: CLINIC | Age: 35
End: 2025-05-05
Payer: COMMERCIAL

## 2025-05-05 DIAGNOSIS — F90.0 ADHD (ATTENTION DEFICIT HYPERACTIVITY DISORDER), INATTENTIVE TYPE: Primary | ICD-10-CM

## 2025-05-05 NOTE — PATIENT INSTRUCTIONS
"Should you want to get in touch with your provider, JUAN Hayes, please contact MY Medical Assistant, Serene, directly at [phone].  Recommend saving Serene's direct number in phone as this is the PREFERRED & EASIEST way to get in contact with your provider.  Please leave a voice mail if you do not get an answer and she will return your call within 24 hrs. You will NOT be able to contact provider on [facility], as Behavioral Health Providers are restricted. YOU MUST CALL [phone]  If you need to speak with the on call provider after hours or on weekends, please Contact the [facility] ([phone]) and staff will be able to page the provider on call directly.     SPECIFIC RECOMMENDATIONS:     1.      Medications discussed at this encounter:                   -  Request refills when needed    2.      Psychotherapy recommendations: Declined     3.     Return to clinic: 3 months Monday 8/18/25 at 0800 via video    Please arrive at least 15 minutes before your scheduled appointment time to complete check in process.      IF you are scheduled for a [facility] [employer] VIDEO visit, YOU MUST COMPLETE THE \"E-CHECK IN\" PROCESS PRIOR TO BEGINNING THE VISIT, You may still complete the E-Check in for in office visits prior to appointment, you will receive multiple text/email reminders which will direct you further if needed.            " Patient has an order for Dobutamine nuclear stress test. Please offer patient 11:45 on Friday 4/16 or anytime on 4/27.   Routing to scheduling and Dr. Sabi matias as an FYI to make sure patient is okay to wait until one of these dates.

## 2025-05-05 NOTE — PROGRESS NOTES
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 my car keys for 3 days.\"  Hard to organize:Yes, \"I am getting better, I use one note for tasks, make them by priority which has helped. I am messier, dirty clothes will sit on floor instead of going into hamper.\"  Talks a lot and cutting people off:No \"I have tried to become more of a listener rather than listening to respond.\" \"I used to cut people off all the time, so I am getting better at that, it is an effort.\" Patient now takes pre-notes prior to meeting, which data is needed in order to answer questions, \"if I don't have it answered, I can get it to you by the end of the day.\"  Drifts off during conversations:Yes if conversation is not entertaining or when wife talks about grocery, \"I stay very engaged in work meetings because of the level of my position, I make a decision to close my laptop, to limit distraction.\"  Difficulty with Reading:No  Difficulty watching TV/Movies:Yes, \"If I don't find something exciting, I will go to next movie, series or video.  Even when I am with my parents, I will pull out phone to get more stimulation.\"     In home setting patient admits to wife having to ask several times if listening, due to not interested in topic, \"a lot of the time I hear her kind of, but I think I make a decision to not respond, it's odd.\" Patient has not worked the last 2 days and has made excuses to not change the cat box.  Has some times of using full day of yard work or house hold chores, " "\"it's either I am extremely productive or I avoid at all cost. It's a lot of excuses.\"  Patient feels avoidance with wife has been ongoing, no changes. Work has made anxiety worse and was started on medications in the last 1.5 yrs.  Patient has had pressure with interviews and promotions.  Patient reports avoiding laundry and would wear all clothing until none remaining, now wife does all the laundry.  Recalls only completing house hold tasks, such as the dishes 90% and wife has to finish the remaining 10%, \"I am the same with food, there's always 10% left on the plate.\"  Symptoms include fidgeting (knee bouncing or spins phone on table or pen in hands), easy distractability, inability to complete tasks, difficulty sustaining attention, shifting from one uncompleted activity to another, talking excessively, ineffective listening, frequently losing items, and impulsivity.  Patient reports frequently allow services to home: bug company spraying every 3 months, bought a 65,000 truck while in [employer], purchased a 1,200 suit one day which was not thought out, and rarely wears.       PHQ-9 Depression Screening  PHQ-9 Total Score:   11/5/2024 0  PHQ-9 Depression Screening  Little interest or pleasure in doing things?     Feeling down, depressed, or hopeless?     PHQ-2 Total Score     Trouble falling or staying asleep, or sleeping too much?     Feeling tired or having little energy?     Poor appetite or overeating?     Feeling bad about yourself - or that you are a failure or have let yourself or your family down?     Trouble concentrating on things, such as reading the newspaper or watching television?     Moving or speaking so slowly that other people could have noticed? Or the opposite - being so fidgety or restless that you have been moving around a lot more than usual?     Thoughts that you would be better off dead, or of hurting yourself in some way?     PHQ-9 Total Score     If you checked off any problems, how " difficult have these problems made it for you to do your work, take care of things at home, or get along with other people?        RAFAEL-7    11/5/2024 0    The following portions of the patient's history were reviewed and updated as appropriate: allergies, current medications, past family history, past medical history, past social history, and past surgical history.     Past Surgical History:  Past Surgical History:   Procedure Laterality Date    ENDOSCOPY N/A 4/14/2022    Procedure: ESOPHAGOGASTRODUODENOSCOPY WITH BX;  Surgeon: Joan Luna MD;  Location: [facility] ENDOSCOPY;  Service: Gastroenterology;  Laterality: N/A;  ESOPHAGITIS, GASTRITIS, ESOPHAGEAL STRICTURE    ENDOSCOPY N/A 5/23/2022    Procedure: ESOPHAGOGASTRODUODENOSCOPY WITH 10-15MM BALLOON DILATATION AND BIOPSIES;  Surgeon: Joan Luna MD;  Location: [facility] ENDOSCOPY;  Service: Gastroenterology;  Laterality: N/A;  GASTRITIS, ESOPHAGITIS, ESOPHAGEAL STRICTURE    HERNIA REPAIR      Hernia Repair: at age 17; right inguinal; uncomplicated; dual;    SHOULDER SURGERY      left shoulder labrum repair at age 22 - sports related injury       Problem List:  Patient Active Problem List   Diagnosis    Esophageal dysphagia    Anxiety    Attention and concentration deficit       Allergy:   No Known Allergies     Discontinued Medications:  There are no discontinued medications.          Current Medications:   Current Outpatient Medications   Medication Sig Dispense Refill    lisdexamfetamine (VYVANSE) 50 MG capsule Take 1 capsule by mouth Every Morning Indications: Attention Deficit Hyperactivity Disorder 30 capsule 0    sertraline (ZOLOFT) 50 MG tablet Take 1 tablet by mouth Every Morning 90 tablet 3    TiZANidine (ZANAFLEX) 4 MG capsule Take 1 capsule by mouth Every 6 to 8 hours as needed for muscle spasticity (Patient not taking: Reported on 11/5/2024) 20 capsule 0     No current facility-administered medications for this visit.       Past  Medical History:  Past Medical History:   Diagnosis Date    Anxiety disorder, unspecified     Concussion     X3    Dysphagia, unspecified     Fever, unspecified     Head injury     Influenza due to identified novel influenza A virus with other respiratory manifestations     Panic disorder     PONV (postoperative nausea and vomiting)     Procreative counseling and advice using natural family planning     PTSD (post-traumatic stress disorder)        Past Psychiatric History:  Began Treatment: 2021  Diagnoses:Anxiety  Psychiatrist: Denies  Therapist:Denies  Admission History:Denies  Medication Trials: Wellbutrin XL up to 300 mg self stopped 1/2023 due to excessive restlessness with combination of Adderall XR 20 mg  Adderall XR 20 mg eff.due to nationwide shortage stopped 1/2023; Azstarys lowest dose 1 month, due to cost stopping,somewhat eff.  Self Harm: Denies  Suicide Attempts:Denies    Substance Abuse History:   Types:Denies all, including illicit  Withdrawal Symptoms:Denies  Longest Period Sober:Not Applicable   AA: Not applicable     Social History: As of 11/5/24  Martial Status:  Employed:Yes and If so, where [id number] and . 9a-5p  Kids:Yes or If so, how many 2 girls     House:Lives in a house   History:  [id number]  Access to Guns: Yes, put in storage not in home    Social History     Socioeconomic History    Marital status:     Number of children: 2    Years of education: 16    Highest education level: Bachelor's degree (e.g., BA, AB, BS)   Tobacco Use    Smoking status: Never    Smokeless tobacco: Former     Types: Chew     Quit date: 5/21/2022   Vaping Use    Vaping status: Never Used   Substance and Sexual Activity    Alcohol use: Yes     Comment: 1-2 drinks per month     Drug use: Not Currently     Types: Marijuana     Comment: back in high school    Sexual activity: Yes       Family History:   Suicide Attempts:  Brother  Suicide  Completions:Denies      Family History   Problem Relation Age of Onset    Sjogren's syndrome Mother     Diverticulitis Mother     Cardiomyopathy Father     Suicide Attempts Brother     Seizures Brother     ADD / ADHD Brother     ADD / ADHD Brother     Cardiomyopathy Paternal Uncle     Dementia Maternal Grandmother        Developmental History:   Born: [place]  Siblings:5   Childhood: Denies Abuse  High School:Completed  College: Bachelors in [employer]  kinesiology     Mental Status Exam:   Hygiene:   good  Cooperation:  Cooperative  Eye Contact:  Good  Psychomotor Behavior:  Appropriate  Affect:  Appropriate  Mood: euthymic   Speech:  Normal  Thought Process:  Goal directed  Thought Content:  Mood congruent  Suicidal:  None  Homicidal:  None  Hallucinations:  None  Delusion:  None  Memory:  Intact  Orientation:  Person, Place, Time and Situation  Reliability:  good  Insight:  Good  Judgement:  Good  Impulse Control:  Good  Physical/Medical Issues:  Yes Esophageal Dysphagia      Review of Systems:  Review of Systems   Constitutional:  Negative for diaphoresis and fatigue.   HENT:  Positive for hearing loss and tinnitus. Negative for drooling.         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   Respiratory:  Negative for cough and shortness of breath.    Cardiovascular:  Negative for chest pain, palpitations and leg swelling.   Gastrointestinal:  Negative for nausea and vomiting.   Endocrine: Negative for cold intolerance and heat intolerance.   Genitourinary:  Negative for difficulty urinating.   Musculoskeletal:  Negative for joint swelling.   Allergic/Immunologic: Negative for immunocompromised state.   Neurological:  Negative for dizziness, tremors, seizures, speech difficulty and numbness.   Psychiatric/Behavioral:  Negative for decreased concentration, hallucinations, self-injury, sleep disturbance and suicidal ideas. The patient is not nervous/anxious and is not  hyperactive.          Physical Exam:  Physical Exam  Psychiatric:         Attention and Perception: Attention and perception normal.         Mood and Affect: Mood and affect normal.         Speech: Speech normal.         Behavior: Behavior normal. Behavior is cooperative.         Thought Content: Thought content normal. Thought content does not include suicidal ideation. Thought content does not include suicidal plan.         Cognition and Memory: Cognition and memory normal.         Judgment: Judgment normal.         Vital Signs:   There were no vitals taken for this visit.     Lab Results:    No visits with results within 6 Month(s) from this visit.   Latest known visit with results is:   Lab on 11/05/2024   Component Date Value Ref Range Status    TSH 11/05/2024 0.473  0.270 - 4.200 uIU/mL Final    T Uptake 11/05/2024 1.01  0.80 - 1.30 TBI Final    T4, Total 11/05/2024 6.20  4.50 - 11.70 mcg/dL Final    T4 results may be falsely increased if patient taking Biotin.       EKG Results:  No orders to display       Imaging Results:  No Images in the past 120 days found..      Assessment & Plan       Visit Diagnoses:    ICD-10-CM ICD-9-CM   1. ADHD (attention deficit hyperactivity disorder), inattentive type  F90.0 314.00                     PLAN:  1.   Safety: No acute safety concerns  Therapy: None  Risk Assessment: Risk of self-harm acutely is moderate.  Risk factors include anxiety disorder,family history, access to guns/weapons, and recent psychosocial stressors (pandemic). Protective factors include no present SI, no history of suicide attempts or self-harm in the past, minimal AODA, healthcare seeking, future orientation, willingness to engage in care.  Risk of self-harm chronically is also moderate, but could be further elevated in the event of treatment noncompliance and/or AODA.  Meds:    -Continue Zoloft 50 mg by mouth daily to target anxiety and PTSD.    -Continue Vyvanse 50 mg by mouth daily in the morning  without food to target symptoms associated with ADHD.  Instructed patient to take on an empty stomach to avoid delayed onset of action, and may eat 20-30 minutes after taking medication, though if unable to tolerate or wait to eat to expect the onset of action to be delayed.  Last dispensed 5/2/25 #30/30 days.  Which patient reports received a notification today it was being shipped via [employer] and should arrive in 2-3 days. Due to approximately 6 day turn around patient was advised to request refill when down to 5-6 days, will plan on sending a 90 day supply due to patient planning on flying out 7/30/35 and will not return until mid August, however, if insurance doesn't cover patient will notify office.   -Controlled substance documentation: [place] reviewed; prior urine drug screen consistent obtained 11/5/24; consent is up to date, signed, witnessed and in EHR, dated 11/5/24, which will be updated annually per policy. Patient is aware of risk of addiction on this medication, understands need to follow up for a review every 3 months and medications will be adjusted or decreased as deemed appropriate at each visit. No history of drug or alcohol abuse.  No concerns about diversion or abuse. Patient denies side effects related to the medication.  Patient is aware of random urine drug screens and pill counts. The dosing of this medication will be reviewed on a regular basis and reduced if possible. Ongoing use of a controlled substance is necessary for this patient to have a normal quality of life.    5. Labs: n/a    Symptoms of anxiety, ADHD are under good control with current medication regimen.    The plan was discussed with the patient. The patient was given time to ask questions and these questions were answered. At the conclusion of their visit they had no additional questions or concerns and all questions were answered to their satisfaction.   Patient was given instructions and counseling regarding condition and  for health maintenance advice. Please see specific information pulled into the AVS if appropriate.    Patient to contact provider if symptoms worsen or fail to improve.      2/4/25:  -Continue Zoloft 50 mg by mouth daily to target anxiety and PTSD.  New prescription sent to [facility] for 90 days with 3 refills as patient has been informed insurance will no longer cover 30 days with local pharmacy for any medications.     -Continue Vyvanse 50 mg by mouth daily in the morning without food to target symptoms associated with ADHD.  Last dispensed 1/2/25 #30/30 days.  Informed patient order would be sent to Dr. Lyles, as Dr. Singh is out of the office today, in separate entry for signature due to EMR system, and will not see as refilled on AVS today. Sending to [facility] for 30 days.    Symptoms of anxiety and ADHD are under good control with current medication regimen. Explained to patient at length regarding pharmacy coverage regarding quantity at local vs mail order preferred pharmacy via employer, and patient wished to proceed with switching.  Patient has adequate supply of Vyvanse while awaiting transition as patient was advised to follow up with [facility] regarding use of miladis, when and how to track shipments, though usual set up return time is approximately 7 days. Patient to contact provider if there are any concerns or problems with switching to mail order.   The plan was discussed with the patient. The patient was given time to ask questions and these questions were answered. At the conclusion of their visit they had no additional questions or concerns and all questions were answered to their satisfaction.   Patient was given instructions and counseling regarding condition and for health maintenance advice. Please see specific information pulled into the AVS if appropriate.    Patient to contact provider if symptoms worsen or fail to improve.        1/2/25:  -Continue Zoloft 50 mg by mouth daily to  target anxiety and PTSD.    -INCREASE Vyvanse FROM 40 mg TO 50 mg by mouth daily in the morning without food to target symptoms associated with ADHD.  Last dispensed 11/26/24 #30/30 days.  Last fill was for the 40 mg as provider was out of the office and the dose was not increased as previously discussed, which will increase with today's order. Informed patient order would be sent to Dr. Singh in separate entry for signature due to EMR system, and will not see as refilled on AVS today.    Symptoms of anxiety and ADHD are under fair control with current medication regimen.    The plan was discussed with the patient. The patient was given time to ask questions and these questions were answered. At the conclusion of their visit they had no additional questions or concerns and all questions were answered to their satisfaction.   Patient was given instructions and counseling regarding condition and for health maintenance advice. Please see specific information pulled into the AVS if appropriate.    Patient to contact provider if symptoms worsen or fail to improve.      11/5/24:   -Continue Zoloft 50 mg by mouth daily to target anxiety and PTSD.      -Continue Vyvanse 40 mg by mouth daily in the morning without food to target symptoms associated with ADHD.  WILL PLAN TO INCREASE DOSE TO 50 mg with next refill request.   Last dispensed 10/22/24 #30/30 days. Filled at [facility] due to [facility] pharmacy did not have supply available due to nationwide shortage.  -Controlled substance documentation: [place] reviewed; prior urine drug screen consistent obtained 11/3/23, obtained today 11/5/24; consent is up to date, signed, witnessed and in EHR, dated today 11/5/24, which will be updated annually per policy. Patient is aware of risk of addiction on this medication, understands need to follow up for a review every 3 months and medications will be adjusted or decreased as deemed appropriate at each visit.  No history of drug  or alcohol abuse.  No concerns about diversion or abuse. Patient denies side effects related to the medication.  Patient is aware of random urine drug screens and pill counts. The dosing of this medication will be reviewed on a regular basis and reduced if possible. Ongoing use of a controlled substance is necessary for this patient to have a normal quality of life.  -Labs: POC UDS today in clinic, results reviewed as expected. CBC, CMP, and Thyroid Panel with TSH as patient has not had routine lab work in several years and no current follow up in EHR to see PCP. Patient has already eaten this morning and a lipid panel was not ordered.     Symptoms of anxiety, depression are under good control with current medication regimen.  ADHD symptoms are under fair control as patient is noticing current dose of Vyvanse is wearing off around 3pm, therefore will increase dose with next refill request and see patient back in 2 months.   The plan was discussed with the patient. The patient was given time to ask questions and these questions were answered. At the conclusion of their visit they had no additional questions or concerns and all questions were answered to their satisfaction.   Patient was given instructions and counseling regarding condition and for health maintenance advice. Please see specific information pulled into the AVS if appropriate.    Patient to contact provider if symptoms worsen or fail to improve.      8/2/24:   -Continue Zoloft 50 mg by mouth daily to target anxiety and PTSD.    -Continue Vyvanse 40 mg by mouth daily in the morning without food to target symptoms associated with ADHD.  Last dispensed 6/25/24 #30/30 days. Pill count today #1. Informed patient order would be sent to Dr. Singh in separate entry for signature due to EMR system, and will not see as refilled on AVS today.   Symptoms of anxiety and ADHD are under good control with current medication regimen.  Next visit will be in the office  for annual CSA.   The plan was discussed with the patient. The patient was given time to ask questions and these questions were answered. At the conclusion of their visit they had no additional questions or concerns and all questions were answered to their satisfaction.   Patient was given instructions and counseling regarding condition and for health maintenance advice. Please see specific information pulled into the AVS if appropriate.    Patient to contact provider if symptoms worsen or fail to improve.      5/2/24:   -Continue Zoloft 50 mg by mouth daily to target anxiety and PTSD.    -Continue Vyvanse 40 mg by mouth daily in the morning without food to target symptoms associated with ADHD.  Instructed patient to take on an empty stomach to avoid delayed onset of action, and may eat 20-30 minutes after taking medication, though if unable to tolerate or wait to eat to expect the onset of action to be delayed.   Last dispensed 4/5/24 #30/30 days. Patient to request refill when needed via [employer]. Instructed patient to write a note upon the refill request if he wishes to have rx sent to [facility] and indicate 90 days, as patient plans to contact mail order pharmacy to determine availability of quantity.     Symptoms of anxiety, ADHD are under good control with current medication regimen.    Patient was given instructions and counseling regarding condition and for health maintenance advice. Please see specific information pulled into the AVS if appropriate.    Patient to contact provider if symptoms worsen or fail to improve.      2/2/24:   -Continue Zoloft 50 mg by mouth daily to target anxiety and PTSD.    -Continue Vyvanse 40 mg by mouth daily in the morning without food to target symptoms associated with ADHD. Last dispensed 1/26/24 #30/30 days. Patient to request refill when needed via [employer].     Symptoms of anxiety and ADHD are under good control with current medication regimen.  Updated pharmacy to  [facility] per patient request which is mail order, secure email sent to patient email which was verified for patient to inform provider of whether or not the mail order pharmacy requires 90 days for the Vyvanse, and how soon the new prescription needs to be sent to ensure no missed doses and the turn around time, as patient was instructed to contact them and get back to provider. Will supply 90 days if pharmacy will fill as in the past the local pharmacy would not fill due to nationwide shortages.   Patient was given instructions and counseling regarding condition and for health maintenance advice. Please see specific information pulled into the AVS if appropriate.    Patient to contact provider if symptoms worsen or fail to improve.        11/3/23:  -Continue Zoloft 50 mg by mouth daily to target anxiety and PTSD.    -Continue Vyvanse 40 mg by mouth daily in the morning without food to target symptoms associated with ADHD.  Instructed patient to take on an empty stomach to avoid delayed onset of action, and may eat 20-30 minutes after taking medication, though if unable to tolerate or wait to eat to expect the onset of action to be delayed.  Risks, benefits, and alternatives discussed with patient including insomnia, headache, irritability, overstimulation, tremor, dizziness, nausea, dry mouth, constipation, diarrhea, weight loss, sexual dysfunction, heart palpitations, elevated heart rate, elevated blood pressure, seizures and suicidal ideations though rare.  After discussion of these risks patient agreed to proceed with Vyvanse. Discussed all questions and concerns.  Patient instructed to contact provider immediately with any intolerable side effects, cardiac symptoms, suicidal ideations,  or worsening anxiety. Last dispensed 10/05/23 #30/30 days. Informed patient order would be sent to Dr. Singh in separate entry for signature due to EMR system, and will not see as refilled on AVS today.  Will send in for 90  day supply with first fill date of Monday 11/6/23, patient is now able to request refills via [employer].     Controlled substance documentation: [place] reviewed; prior urine drug screen consistent obtained 11/3/22, ordered for today; consent is up to date, signed, witnessed and in EHR, dated today 11/3/23, which will be updated annually per policy. Patient is aware of risk of addiction on this medication, understands need to follow up for a review every 3 months and medications will be adjusted or decreased as deemed appropriate at each visit.  No history of drug or alcohol abuse.  No concerns about diversion or abuse. Patient denies side effects related to the medication.  Patient is aware of random urine drug screens and pill counts. The dosing of this medication will be reviewed on a regular basis and reduced if possible. Ongoing use of a controlled substance is necessary for this patient to have a normal quality of life.  Labs: POC UDS today in clinic  Symptoms of ADHD are under good control with current medication regimen.    Patient was given instructions and counseling regarding condition and for health maintenance advice. Please see specific information pulled into the AVS if appropriate.    Patient to contact provider if symptoms worsen or fail to improve.      8/16/23:   Continue Zoloft 50 mg by mouth daily to target anxiety.      Continue Vyvanse 40 mg by mouth daily in the morning without food to target symptoms associated with ADHD.  Instructed patient to take on an empty stomach to avoid delayed onset of action, and may eat 20-30 minutes after taking medication, though if unable to tolerate or wait to eat to expect the onset of action to be delayed.  Risks, benefits, and alternatives discussed with patient including insomnia, headache, irritability, overstimulation, tremor, dizziness, nausea, dry mouth, constipation, diarrhea, weight loss, sexual dysfunction, heart palpitations, elevated heart rate,  elevated blood pressure, seizures and suicidal ideations though rare.  After discussion of these risks patient agreed to proceed with Vyvanse. Discussed all questions and concerns.  Patient instructed to contact provider immediately with any intolerable side effects, cardiac symptoms, suicidal ideations,  or worsening anxiety. Last dispensed 23 #30/30 days. Informed patient order would be sent to Dr. Singh in separate entry for signature due to EMR system, and will not see as refilled on AVS today.  With first fill date of 23.     Symptoms of ADHD are under good control with current medication regimen.  Next visit will be in the office for annual CSA and UDS, scheduled for Friday Nov. 3, 2023 at 8 am.   Patient was given instructions and counseling regarding condition and for health maintenance advice. Please see specific information pulled into the AVS if appropriate.    Patient to contact provider if symptoms worsen or fail to improve.         23:  Continue Zoloft 50 mg by mouth daily to target anxiety.  Refilled today for 90 days with 3 refills.    Continue Vyvanse 40 mg by mouth daily in the morning without food to target symptoms associated with ADHD. Last dispensed 6/15/23 #30/30 days.  Informed patient order would be sent to Dr. Lyles in separate entry for signature due to EMR system, and will not see as refilled on AVS today, as Dr. Singh is out of the office.    Symptoms of anxiety and ADHD are under good control with current medication regimen.  Discussed option for a 90 day supply once long term dose determined, as patient started 40 mg of Vyvanse 23, will see back in 4 weeks to determine if further dose adjustments are needed.  Patient informed current CSA will  in Nov. 3 And will need to be seen in office by or before Nov. 3, 2023.   Patient was given instructions and counseling regarding condition and for health maintenance advice. Please see specific information pulled into  the AVS if appropriate.    Patient to contact provider if symptoms worsen or fail to improve.         6/21/23:  -Continue Zoloft 50 mg by mouth daily to target anxiety.  Due to upcoming travel to [place] and [place] from 6/30/23-7/18/23, will send in a quantity of 4 to [facility] pharmacy today, will fill for 90 days at next appointment.   -Continue Vyvanse 40 mg by mouth daily in the morning without food to target symptoms associated with ADHD.  Instructed patient to take on an empty stomach to avoid delayed onset of action, and may eat 20-30 minutes after taking medication, though if unable to tolerate or wait to eat to expect the onset of action to be delayed.  Risks, benefits, and alternatives discussed with patient including insomnia, headache, irritability, overstimulation, tremor, dizziness, nausea, dry mouth, constipation, diarrhea, weight loss, sexual dysfunction, heart palpitations, elevated heart rate, elevated blood pressure, seizures and suicidal ideations though rare.  After discussion of these risks patient agreed to proceed with Vyvanse. Discussed all questions and concerns.  Patient instructed to contact provider immediately with any intolerable side effects, cardiac symptoms, suicidal ideations,  or worsening anxiety. Last dispensed 6/15/23 #30/30 days. Due to upcoming travel to [place] and [place] from 6/30/23-7/18/23, will send in a quantity of 4 to [facility] pharmacy today with allowed early fill.  Informed patient order would be sent to Dr. Singh in separate entry for signature due to EMR system, and will not see as refilled on AVS today.     Controlled substance documentation: [place] reviewed; prior urine drug screen consistent obtained 11/3/22; consent is up to date, signed, witnessed and in EHR, dated 11/3/22, Vyvanse consent for treatment form dated 4/12/23, which will be updated annually per policy. Patient is aware of risk of addiction on this medication,  understands need to follow up for a review every 3 months and medications will be adjusted or decreased as deemed appropriate at each visit.  No history of drug or alcohol abuse.  No concerns about diversion or abuse. Patient denies side effects related to the medication.  Patient is aware of random urine drug screens and pill counts. The dosing of this medication will be reviewed on a regular basis and reduced if possible. Ongoing use of a controlled substance is necessary for this patient to have a normal quality of life.  Symptoms of ADHD and anxiety are under good control with current medication regimen. Tolerating well without reported side effects.   Patient to contact provider if symptoms worsen or fail to improve.        5/12/23:  Symptoms of ADHD are under fair control with Vyvanse 30 mg, will increase dose today to 40 mg. Last dispensed 4/12/23 #30/30 days. Tolerating well without reported side effects.   Patient to contact provider if symptoms worsen or fail to improve.        4/12/23:   -Continue Zoloft 50 mg by mouth daily to target anxiety.    -Stop Azstarys (Serdexmethylphenidate and dexmethylphenidate) 26.1 mg-5.2 mg due to cost of medication.   -Start  Vyvanse 30 mg by mouth daily in the morning without food to target symptoms associated with ADHD.  Instructed patient to take on an empty stomach to avoid delayed onset of action, and may eat 20-30 minutes after taking medication, though if unable to tolerate or wait to eat to expect the onset of action to be delayed.  Risks, benefits, and alternatives discussed with patient including insomnia, headache, irritability, overstimulation, tremor, dizziness, nausea, dry mouth, constipation, diarrhea, weight loss, sexual dysfunction, heart palpitations, elevated heart rate, elevated blood pressure, seizures and suicidal ideations though rare.  After discussion of these risks patient agreed to proceed with Vyvanse. Discussed all questions and concerns.   Patient instructed to contact provider immediately with any intolerable side effects, cardiac symptoms, suicidal ideations,  or worsening anxiety.     Informed patient order would be sent to Dr. Singh in separate entry for signature due to EMR system, and will not see as refilled on AVS today.  Instructed patient to come by [place] office today as he planned to sign medication portion controlled substance for Azstarys and Vyvanse.     Symptoms of ADHD are not under good control with Azstarys and due to high cost patient wishes to switch to Vyvanse.   Contacted [place] office staff and instructed staff to place another controlled substance consent to treat with Vyvanse and place in provider box for patient to sign both forms today.  Will send Vyvanse to [facility] as patient is going there today. Patient to contact provider if symptoms worsen or fail to improve.     3/15/23:  Continue Zoloft 50 mg by mouth daily to target anxiety.    -Discontinue Adderall XR 20 mg due to nationwide shortage patient has not been able to locate a pharmacy that has medication in stock, and has not had medication for 1 month.  last dispensed 12/28/22 #30/30 days.      Start Azstarys (Serdexmethylphenidate and dexmethylphenidate) 26.1 mg-5.2 mg by mouth daily in the morning on an empty stomach (may eat in 20-30 minutes to help with absorption) to target symptoms associated with ADHD. Risks, benefits, side effects discussed with patient including elevated heart rate, elevated blood pressure, irritability, insomnia, sexual dysfunction, appetite suppressing properties, psychosis. After discussion of these risks and benefits, the patient voiced understanding and agreed to proceed. Informed patient order would be sent to Dr. Singh in separate entry for signature due to EMR system, and will not see as refilled on AVS today.  Instructed patient to come by [place] office within the next week to sign medication portion controlled  "substance for Azstarys.     Due to nationwide shortage of Adderall, discussed various options for ADHD treatment, patient willing to switch to alternate medication. Symptoms of ADHD remain present as patient has not had medication for at least 1 month.  Patient to contact provider if unable to  medication by tomorrow, given direct contact number for MA at [place] office.  Patient to contact provider if symptoms worsen or fail to improve.       2/9/23:   Your request has been approved  Your PA request has been approved. Additional information will be provided in the approval communication. **LPN Reviewed- Complete Clinical**COT, paid claim//MDO verified information// Last paid claim 12-// Adderall XR 20 MG #30/30ds//F90.0 Attn-defct hyperactivity disorder, predom inattentive type//75 Prior Authorization Reqrd +MUST Use generics, AZSTARYS, JORNAY PM, MYDAYIS, VYVANSE MEDICAL NECESSITY PA ONLY [phone] Drug Requires Prior Authorization// Approve, 36 months. -TC, LPN       ADDERALL XR 20MG            11:56 AM    Serene Salazar MA contacted Thomas Lie \"Renard\"    Serene Salazar MA         11:58 AM  Note    Called patient to let him know that we have received the approval for his Adderall XR 20mg        2/7/23:  Continue Zoloft 50 mg by mouth daily to target anxiety.      Continue Adderall XR 20 mg by mouth daily in the morning to target symptoms associated with ADHD. Risks, benefits, side effects discussed with patient including elevated heart rate, elevated blood pressure, irritability, insomnia, sexual dysfunction, appetite suppressing properties, psychosis.  After discussion of these risks and benefits, the patient voiced understanding and agreed to proceed. [place] reviewed, last dispensed 12/28/22 #30/30 days.  New order for 20 mg dose sent 1/26/23 to [facility] pharmacy due to [facility] pharmacy out of stock, however, awaiting PA from insurance at this time.     Patient noted " with improvement of ADHD symptoms with dose increase of Adderall XR to 20 mg, however, PA is required which was reviewed with patient during visit.  Patient given detailed information to follow up with insurance company to determine specifics regarding which and how many medications have to be tried before restarting current dose of Adderall and contact office to MA directly later today.  Patient was not allowed option to pay out of pocket from pharmacy.  Will be in touch with patient later today.        2/6/23:  VICENTA LEI Key: [id number] - PA Case ID: [id number] - Rx #: [id number]  PA for Adderall XR 20mg sent to plan 02/06/2023 awaiting decision from insurance company      2/2/23: [employer] message from patient wife to PCP which was directed to this provider:   This message is being sent by Cindi Lei on behalf of Vicenta Lei.   Hi Dr. Haskins,     This is Cindi Lei on Vicenta Lei account. I am trying to  his medication, the generic Adderral and they are saying the extended release capsules are on backorder. The only thing the have in stock are non extended release tablets. Are you able to switch the prescription to that so it can be filled until the other kind comes in? I am at [facility] now. Thank you!  Patient is aware that communication with behavioral health has to be done via telephone, and when the pharmacy does not have supply in stock, as this has happened before, he is to contact other pharmacies to determine availability and then inform the office.  The prescription was ordered 1/26/23 which was 7 days ago, patient will need to follow process per CSA.         1/25/23:   After Rescheduling patient today he wanted me to let Celina know that the Adderall XR 20 mg seems to be working well, but he did DC the Wellbutrin due to excessive restlessness.  I rescheduled patient to February 07/2023.      12/28/22:   -Continue Zoloft 50 mg by mouth daily to target anxiety.    -Continue  Wellbutrin  mg po daily in the morning to target attention and concentration deficit.      Increase Adderall XR from 15 mg TO 20 mg by mouth daily in the morning to target symptoms associated with ADHD. Risks, benefits, side effects discussed with patient including elevated heart rate, elevated blood pressure, irritability, insomnia, sexual dysfunction, appetite suppressing properties, psychosis.  After discussion of these risks and benefits, the patient voiced understanding and agreed to proceed. [place] reviewed, last dispensed 15 mg 11/30/22 #30/30 days.  Informed patient order would be sent to JUAN Lang in practice, as  is out of office today, in separate entry for signature due to EMR system, and will not see as refilled on AVS today.  Adderall XR 15 mg providing inconsistent relief of ADHD symptoms, denies side effects, will increase dose today and have patient return in 3 weeks, as patient will be returning to work before 4 weeks from today.  Patient to track daily symptoms, activities, ability to complete tasks and report at next appointment.  Patient instructed to notify provider if experiencing insomnia as combination with Wellbutrin XL may effect sleep and dose may need to be lowered.  Patient to contact provider if symptoms worsen or fail to improve.       11/30/22:  Continue Zoloft 50 mg by mouth daily to target anxiety.      Continue Wellbutrin  mg po daily in the morning to target attention and concentration deficit.  Instructed patient to request refill in 2-3 weeks from pharmacy if continues to tolerate with Adderall XR, otherwise to call office and request dose decrease to 150 mg.   Increase Adderall XR from 10 mg TO 15 mg by mouth daily in the morning to target symptoms associated with ADHD. Risks, benefits, side effects discussed with patient including elevated heart rate, elevated blood pressure, irritability, insomnia, sexual dysfunction, appetite suppressing properties,  psychosis.  After discussion of these risks and benefits, the patient voiced understanding and agreed to proceed. [place] reviewed, last dispensed 10 mg dose 11/3/22 #30/30 days.  Informed patient order would be sent to Dr. Singh in separate entry for signature due to EMR system, and will not see as refilled on AVS today.  Controlled substance documentation: [place] reviewed; prior urine drug screen consistent obtained 11/3/22; consent is up to date, signed, witnessed and in EHR, dated 11/3/22, which will be updated annually per policy. Patient is aware of risk of addiction on this medication, understands need to follow up for a review every 3 months and medications will be adjusted or decreased as deemed appropriate at each visit.  No history of drug or alcohol abuse.  No concerns about diversion or abuse. Patient denies side effects related to the medication.  Patient is aware of random urine drug screens and pill counts. The dosing of this medication will be reviewed on a regular basis and reduced if possible..  Ongoing use of a controlled substance is necessary for this patient to have a normal quality of life.    Anxiety symptoms are under good control with Zoloft and Wellbutrin XL.  ADHD symptoms remain present, minimal effectiveness with 10 mg dose of Adderall XR, tolerating well without side effects, will increase dose today and send to [facility] pharmacy, patient instructed to contact either [place] office and leave vm with MA and if not heard back within 1-2 hrs to call the [place] office, or patient may call the [facility] office initially as MA is not physically in the [place] office today though checks vm.  Patient verbalized understanding. Patient to contact provider if symptoms worsen or fail to improve.     11/3/22:   Patient called to inform [facility], primary pharmacy, does not have Adderall XR 10 mg in stock, patient was instructed to call other local pharmacies to check availability.    Patient called back to tell us that he checked around and [facility] has the Adderall.  Please send there Pharmacy changed and med pended in chart    11/3/22:  Continue Zoloft 50 mg by mouth daily to target anxiety.      Continue Wellbutrin  mg po daily in the morning to target attention and concentration deficit.      Start Adderall XR 10 mg by mouth daily in the morning to target symptoms associated with ADHD. Risks, benefits, side effects discussed with patient including elevated heart rate, elevated blood pressure, irritability, insomnia, sexual dysfunction, appetite suppressing properties, psychosis.  After discussion of these risks and benefits, the patient voiced understanding and agreed to proceed. [place] reviewed, UDS ordered, and controlled substance agreement signed & witnessed. Informed patient prescription would not be ordered until UDS results noted as negative, expected results within 30 minutes of providing sample.   POC UDS today in clinic  ADHD testing results reviewed from Dr. Eladio Dunn,[facility] dated 9/29/2022 indicating a diagnosis of RAFAEL and ADHD.     Patient to request refills for Wellbutrin XL and Zoloft in approximately 2 weeks, as patient has been educated he may need to decrease Wellbutrin XL dose due to addition of Adderall XR.  Patient to contact provider if symptoms worsen or fail to improve.     10/3/22:   -Continue Zoloft 50 mg by mouth daily to target anxiety.    -Continue Wellbutrin  mg po daily in the morning to target attention and concentration deficit.     Patient denies need for refills at this time, tolerating current medications well without reported side effects.  Patient reported completing ADHD testing last week with Dr. Eladio Dunn, report not expected for at least 2 weeks. Will plan on next appointment in 4 weeks in person due to possible stimulant medication needed, as patient will be starting an new job Nov 6 and would prefer to come in the week  prior.  If ADHD report findings indicate no formal diagnosis of ADHD will contact patient prior to next appointment.  Patient to contact provider if symptoms worsen or fail to improve.       7/26/22:   Declined therapy  Continue Zoloft 50 mg by mouth daily to target anxiety.  Risks, benefits, alternatives discussed with patient including GI upset, nausea vomiting diarrhea, theoretical decrease of seizure threshold predisposing the patient to a slightly higher seizure risk, headaches, sexual dysfunction, serotonin syndrome, bleeding risk, increased suicidality in patients 24 years and younger.  After discussion of these risks and benefits, the patient voiced understanding and agreed to proceed.  Increase Wellbutrin XL from 150 mg to 300 mg (instructed to start taking 2 tabs of the 150 mg on hand until new prescription picked up) po daily in the morning to target attention and concentration deficit. Discussed all risks, benefits, alternatives, and side effects of Bupropion/Wellbutrin including but not limited to GI upset (N/V/D, constipation), tachycardia, diaphoresis, weight loss, agitation, dizziness, headache, insomnia, tremor, blurred vision, anorexia, HTN, activation of char or hypomania, CNS stimulation and neuropsychiatric effects, ocular effects, seizure risk, withdrawal syndrome following abrupt discontinuation, and activation of suicidal ideation and behavior. Patient  educated on the need to practice safe sex while taking this med. Discussed the need for patient to immediately call the office for any new or worsening symptoms, such as worsening depression; feeling nervous or restless; suicidal thoughts or actions; or other changes in mood or behavior, and all other concerns. Patient educated on medication compliance. Patient  verbalized understanding and is agreeable to taking Bupropion/Wellbutrin. Addressed all questions and concerns.     Referral for ADHD testing.  Patient to contact provider and set up  appointment.  And to contact office if unable to get an appointment scheduled. -Attached list of several other sites for ADHD testing. Patient instructed to contact providers on list to determine availability of appointments, instructed patient to take notes while calling places indicating first available appointment, and to ask to be placed on waiting list.  If an office is chosen and requests the referral order please contact my Medical Assistant, Serene, directly at [phone] informing of chosen office and the information will be sent.    Dr. Eladio Dunn, Psychologist, MS, LPP  [address], [address]  [place] [id number] [phone]   Currently only accepting referrals for psychological testing services.  Accepts Most insurance plans except Medicare Plans    Patient presentation seems most consistent with anxiety vs ADHD, due to high suspicion of ADHD will refer for formal testing.  Increased Wellbutrin XL today , will see patient back in 4 weeks.       Patient screened positive for depression based on a PHQ-9 score of 0 on 11/5/2024. Follow-up recommendations include: Prescribed antidepressant medication treatment and Suicide Risk Assessment performed.     TREATMENT PLAN/GOALS: Continue supportive psychotherapy efforts and medications as indicated. Treatment and medication options discussed during today's visit. Patient ackowledged and verbally consented to continue with current treatment plan and was educated on the importance of compliance with treatment and follow-up appointments.    MEDICATION ISSUES:  ANUSHA reviewed as expected.  Discussed medication options and treatment plan of prescribed medication as well as the risks, benefits, and side effects including potential falls, possible impaired driving and metabolic adversities among others. Patient is agreeable to call the office with any worsening of symptoms or onset of side effects. Patient is agreeable to call 911 or go to the  nearest ER should he/she begin having SI/HI. No medication side effects or related complaints today.     MEDS ORDERED DURING VISIT:  No orders of the defined types were placed in this encounter.      Return in about 3 months (around 8/5/2025) for Video visit, medication check.         I spent 22 minutes caring for Thomas on this date of service. This time includes time spent by me in the following activities: preparing for the visit, performing a medically appropriate examination and/or evaluation, counseling and educating the patient/family/caregiver, referring and communicating with other health care professionals, documenting information in the medical record, care coordination, and scheduling .      This document has been electronically signed by JUAN Hayes  May 5, 2025 09:02 EDT      Part of this note may be an electronic transcription/translation of spoken language to printed text using the Dragon Dictation System.

## 2025-06-14 DIAGNOSIS — F90.0 ADHD (ATTENTION DEFICIT HYPERACTIVITY DISORDER), INATTENTIVE TYPE: ICD-10-CM

## 2025-06-16 RX ORDER — LISDEXAMFETAMINE DIMESYLATE 50 MG/1
50 CAPSULE ORAL EVERY MORNING
Qty: 30 CAPSULE | Refills: 0 | Status: SHIPPED | OUTPATIENT
Start: 2025-06-17

## 2025-06-16 NOTE — TELEPHONE ENCOUNTER
REFILL REQUEST:     lisdexamfetamine (VYVANSE) 50 MG capsule (05/03/2025)     F/UP- 08/18/2025.  LOV: 05/05/2025.    LAST UDS:  POC Medline 12 Panel Urine Drug Screen (11/05/2024 09:13)

## 2025-07-22 DIAGNOSIS — F90.0 ADHD (ATTENTION DEFICIT HYPERACTIVITY DISORDER), INATTENTIVE TYPE: ICD-10-CM

## 2025-07-22 DIAGNOSIS — F41.1 GENERALIZED ANXIETY DISORDER: ICD-10-CM

## 2025-07-22 RX ORDER — LISDEXAMFETAMINE DIMESYLATE 50 MG/1
50 CAPSULE ORAL EVERY MORNING
Qty: 30 CAPSULE | Refills: 0 | Status: SHIPPED | OUTPATIENT
Start: 2025-07-23

## 2025-07-22 NOTE — TELEPHONE ENCOUNTER
REFILL REQUEST:       sertraline (ZOLOFT) 50 MG tablet (03/28/2025)      F/UP: 08/18/2025.  LOV: 05/05/2025.

## 2025-07-22 NOTE — TELEPHONE ENCOUNTER
REFILL REQUEST:       lisdexamfetamine (VYVANSE) 50 MG capsule (06/17/2025)      F/UP: 08/18/2025.  LOV: 05/05/2025.     LAST UDS:   POC Medline 12 Panel Urine Drug Screen (11/05/2024 09:13)     LAST CSA:  BEHAVIORAL HEALTH - SCAN - CONTROLLED SUBSTANCE AGREEMENT [facility],11/05/2024 (11/05/2024)

## (undated) DEVICE — Device: Brand: DEFENDO AIR/WATER/SUCTION AND BIOPSY VALVE

## (undated) DEVICE — ESOPHAGEAL BALLOON DILATATION CATHETER: Brand: CRE FIXED WIRE

## (undated) DEVICE — SOL IRRG H2O PL/BG 1000ML STRL

## (undated) DEVICE — DEV INFL CRE STERIFLATE 60CC DISP

## (undated) DEVICE — EGD OR ERCP KIT: Brand: MEDLINE INDUSTRIES, INC.

## (undated) DEVICE — SINGLE-USE BIOPSY FORCEPS: Brand: RADIAL JAW 4